# Patient Record
Sex: MALE | Race: WHITE | URBAN - METROPOLITAN AREA
[De-identification: names, ages, dates, MRNs, and addresses within clinical notes are randomized per-mention and may not be internally consistent; named-entity substitution may affect disease eponyms.]

---

## 2017-03-13 ENCOUNTER — OFFICE VISIT (OUTPATIENT)
Dept: ENDOCRINOLOGY | Age: 67
End: 2017-03-13

## 2017-03-13 VITALS
DIASTOLIC BLOOD PRESSURE: 82 MMHG | HEIGHT: 69 IN | HEART RATE: 73 BPM | SYSTOLIC BLOOD PRESSURE: 130 MMHG | WEIGHT: 221 LBS | OXYGEN SATURATION: 98 % | BODY MASS INDEX: 32.73 KG/M2

## 2017-03-13 DIAGNOSIS — E78.2 MIXED HYPERLIPIDEMIA: ICD-10-CM

## 2017-03-13 DIAGNOSIS — N18.3 CHRONIC KIDNEY DISEASE (CKD), STAGE 3 (MODERATE): ICD-10-CM

## 2017-03-13 DIAGNOSIS — E55.9 VITAMIN D DEFICIENCY: ICD-10-CM

## 2017-03-13 DIAGNOSIS — G63 POLYNEUROPATHY ASSOCIATED WITH UNDERLYING DISEASE (HCC): ICD-10-CM

## 2017-03-13 DIAGNOSIS — R79.89 ABNORMAL CBC: ICD-10-CM

## 2017-03-13 PROCEDURE — 99204 OFFICE O/P NEW MOD 45 MIN: CPT | Performed by: NURSE PRACTITIONER

## 2017-03-13 RX ORDER — FUROSEMIDE 20 MG/1
20 TABLET ORAL 2 TIMES DAILY
COMMUNITY

## 2017-03-13 RX ORDER — NEBIVOLOL 20 MG/1
20 TABLET ORAL DAILY
COMMUNITY

## 2017-03-13 RX ORDER — INSULIN GLARGINE 100 [IU]/ML
50 INJECTION, SOLUTION SUBCUTANEOUS NIGHTLY
COMMUNITY

## 2017-03-13 RX ORDER — LOSARTAN POTASSIUM 100 MG/1
100 TABLET ORAL DAILY
COMMUNITY

## 2017-03-13 RX ORDER — CLONIDINE HYDROCHLORIDE 0.2 MG/1
0.2 TABLET ORAL 2 TIMES DAILY
COMMUNITY

## 2017-03-13 RX ORDER — ATORVASTATIN CALCIUM 40 MG/1
40 TABLET, FILM COATED ORAL DAILY
COMMUNITY

## 2017-03-15 ENCOUNTER — TELEPHONE (OUTPATIENT)
Dept: ENDOCRINOLOGY | Age: 67
End: 2017-03-15

## 2017-04-05 ENCOUNTER — PROCEDURE VISIT (OUTPATIENT)
Dept: ENDOCRINOLOGY | Age: 67
End: 2017-04-05

## 2017-04-05 PROCEDURE — 95251 CONT GLUC MNTR ANALYSIS I&R: CPT | Performed by: NURSE PRACTITIONER

## 2017-04-05 PROCEDURE — 95250 CONT GLUC MNTR PHYS/QHP EQP: CPT | Performed by: NURSE PRACTITIONER

## 2017-04-25 DIAGNOSIS — E78.2 MIXED HYPERLIPIDEMIA: ICD-10-CM

## 2017-04-25 DIAGNOSIS — E55.9 VITAMIN D DEFICIENCY: ICD-10-CM

## 2017-04-25 DIAGNOSIS — G63 POLYNEUROPATHY ASSOCIATED WITH UNDERLYING DISEASE (HCC): ICD-10-CM

## 2017-04-25 DIAGNOSIS — N18.3 CHRONIC KIDNEY DISEASE (CKD), STAGE 3 (MODERATE): ICD-10-CM

## 2017-04-25 DIAGNOSIS — R79.89 ABNORMAL CBC: ICD-10-CM

## 2017-04-25 LAB
A/G RATIO: 1.3 (ref 1.1–2.2)
ALBUMIN SERPL-MCNC: 3.4 G/DL (ref 3.4–5)
ALP BLD-CCNC: 60 U/L (ref 40–129)
ALT SERPL-CCNC: 12 U/L (ref 10–40)
ANION GAP SERPL CALCULATED.3IONS-SCNC: 14 MMOL/L (ref 3–16)
AST SERPL-CCNC: 11 U/L (ref 15–37)
BASOPHILS ABSOLUTE: 0.1 K/UL (ref 0–0.2)
BASOPHILS RELATIVE PERCENT: 0.8 %
BILIRUB SERPL-MCNC: 0.4 MG/DL (ref 0–1)
BUN BLDV-MCNC: 34 MG/DL (ref 7–20)
CALCIUM SERPL-MCNC: 8.2 MG/DL (ref 8.3–10.6)
CHLORIDE BLD-SCNC: 103 MMOL/L (ref 99–110)
CO2: 23 MMOL/L (ref 21–32)
CREAT SERPL-MCNC: 1.6 MG/DL (ref 0.8–1.3)
CREATININE URINE: 120 MG/DL (ref 39–259)
EOSINOPHILS ABSOLUTE: 0.4 K/UL (ref 0–0.6)
EOSINOPHILS RELATIVE PERCENT: 3 %
FOLATE: >20 NG/ML (ref 4.78–24.2)
GFR AFRICAN AMERICAN: 52
GFR NON-AFRICAN AMERICAN: 43
GLOBULIN: 2.7 G/DL
GLUCOSE BLD-MCNC: 252 MG/DL (ref 70–99)
HCT VFR BLD CALC: 31.3 % (ref 40.5–52.5)
HEMOGLOBIN: 9.8 G/DL (ref 13.5–17.5)
HOMOCYSTEINE: 12 UMOL/L (ref 0–10)
LYMPHOCYTES ABSOLUTE: 2.8 K/UL (ref 1–5.1)
LYMPHOCYTES RELATIVE PERCENT: 23.1 %
MAGNESIUM: 2.4 MG/DL (ref 1.8–2.4)
MCH RBC QN AUTO: 25.3 PG (ref 26–34)
MCHC RBC AUTO-ENTMCNC: 31.3 G/DL (ref 31–36)
MCV RBC AUTO: 80.8 FL (ref 80–100)
MICROALBUMIN UR-MCNC: 503 MG/DL
MICROALBUMIN/CREAT UR-RTO: 4191.7 MG/G (ref 0–30)
MONOCYTES ABSOLUTE: 1.1 K/UL (ref 0–1.3)
MONOCYTES RELATIVE PERCENT: 9 %
NEUTROPHILS ABSOLUTE: 7.7 K/UL (ref 1.7–7.7)
NEUTROPHILS RELATIVE PERCENT: 64.1 %
PDW BLD-RTO: 15.4 % (ref 12.4–15.4)
PLATELET # BLD: 335 K/UL (ref 135–450)
PMV BLD AUTO: 7.4 FL (ref 5–10.5)
POTASSIUM SERPL-SCNC: 4.8 MMOL/L (ref 3.5–5.1)
RBC # BLD: 3.87 M/UL (ref 4.2–5.9)
SODIUM BLD-SCNC: 140 MMOL/L (ref 136–145)
TOTAL PROTEIN: 6.1 G/DL (ref 6.4–8.2)
URIC ACID, SERUM: 6.4 MG/DL (ref 3.5–7.2)
VITAMIN B-12: 594 PG/ML (ref 211–911)
VITAMIN D 25-HYDROXY: 17.1 NG/ML
WBC # BLD: 12 K/UL (ref 4–11)

## 2017-04-27 LAB
C-PEPTIDE: 3.3 NG/ML (ref 0.8–3.5)
METHYLMALONIC ACID: 0.21 UMOL/L (ref 0–0.4)

## 2017-04-27 RX ORDER — ERGOCALCIFEROL (VITAMIN D2) 1250 MCG
50000 CAPSULE ORAL WEEKLY
Qty: 12 CAPSULE | Refills: 2 | Status: SHIPPED | OUTPATIENT
Start: 2017-04-27 | End: 2017-05-27

## 2017-06-26 ENCOUNTER — TELEPHONE (OUTPATIENT)
Dept: ENDOCRINOLOGY | Age: 67
End: 2017-06-26

## 2017-06-27 ENCOUNTER — OFFICE VISIT (OUTPATIENT)
Dept: ENDOCRINOLOGY | Age: 67
End: 2017-06-27

## 2017-06-27 VITALS
HEART RATE: 62 BPM | BODY MASS INDEX: 31.43 KG/M2 | HEIGHT: 69 IN | DIASTOLIC BLOOD PRESSURE: 82 MMHG | SYSTOLIC BLOOD PRESSURE: 128 MMHG | OXYGEN SATURATION: 98 % | WEIGHT: 212.2 LBS

## 2017-06-27 DIAGNOSIS — D72.828 OTHER ELEVATED WHITE BLOOD CELL (WBC) COUNT: ICD-10-CM

## 2017-06-27 DIAGNOSIS — G63 POLYNEUROPATHY ASSOCIATED WITH UNDERLYING DISEASE (HCC): ICD-10-CM

## 2017-06-27 DIAGNOSIS — E78.2 MIXED HYPERLIPIDEMIA: ICD-10-CM

## 2017-06-27 DIAGNOSIS — N18.4 CHRONIC KIDNEY DISEASE (CKD), STAGE 4 (SEVERE): ICD-10-CM

## 2017-06-27 DIAGNOSIS — E55.9 VITAMIN D DEFICIENCY: ICD-10-CM

## 2017-06-27 DIAGNOSIS — R79.83 HOMOCYSTEINEMIA: ICD-10-CM

## 2017-06-27 DIAGNOSIS — D72.821 MONOCYTOSIS: ICD-10-CM

## 2017-06-27 PROCEDURE — 99214 OFFICE O/P EST MOD 30 MIN: CPT | Performed by: NURSE PRACTITIONER

## 2017-06-27 ASSESSMENT — PATIENT HEALTH QUESTIONNAIRE - PHQ9
SUM OF ALL RESPONSES TO PHQ9 QUESTIONS 1 & 2: 0
2. FEELING DOWN, DEPRESSED OR HOPELESS: 0
SUM OF ALL RESPONSES TO PHQ QUESTIONS 1-9: 0
1. LITTLE INTEREST OR PLEASURE IN DOING THINGS: 0

## 2017-07-17 ENCOUNTER — CLINICAL DOCUMENTATION (OUTPATIENT)
Dept: NEPHROLOGY | Age: 67
End: 2017-07-17

## 2017-07-26 RX ORDER — LANCETS
1 EACH MISCELLANEOUS
Qty: 150 EACH | Refills: 1 | Status: SHIPPED | OUTPATIENT
Start: 2017-07-26

## 2023-03-02 ENCOUNTER — APPOINTMENT (OUTPATIENT)
Dept: CT IMAGING | Age: 73
DRG: 871 | End: 2023-03-02
Payer: MEDICARE

## 2023-03-02 ENCOUNTER — HOSPITAL ENCOUNTER (INPATIENT)
Age: 73
LOS: 3 days | Discharge: HOME | DRG: 871 | End: 2023-03-05
Attending: EMERGENCY MEDICINE | Admitting: HOSPITALIST
Payer: MEDICARE

## 2023-03-02 ENCOUNTER — APPOINTMENT (OUTPATIENT)
Dept: GENERAL RADIOLOGY | Age: 73
DRG: 871 | End: 2023-03-02
Payer: MEDICARE

## 2023-03-02 DIAGNOSIS — R65.10 SIRS (SYSTEMIC INFLAMMATORY RESPONSE SYNDROME) (HCC): Primary | ICD-10-CM

## 2023-03-02 PROBLEM — A41.9 SEPSIS (HCC): Status: ACTIVE | Noted: 2023-03-02

## 2023-03-02 LAB
A/G RATIO: 1.4 (ref 1.1–2.2)
ALBUMIN SERPL-MCNC: 4 G/DL (ref 3.4–5)
ALP BLD-CCNC: 54 U/L (ref 40–129)
ALT SERPL-CCNC: 20 U/L (ref 10–40)
ANION GAP SERPL CALCULATED.3IONS-SCNC: 17 MMOL/L (ref 3–16)
APTT: 29.6 SEC (ref 23–34.3)
AST SERPL-CCNC: 24 U/L (ref 15–37)
BILIRUB SERPL-MCNC: 1.6 MG/DL (ref 0–1)
BUN BLDV-MCNC: 68 MG/DL (ref 7–20)
CALCIUM SERPL-MCNC: 9 MG/DL (ref 8.3–10.6)
CHLORIDE BLD-SCNC: 99 MMOL/L (ref 99–110)
CO2: 25 MMOL/L (ref 21–32)
CREAT SERPL-MCNC: 3.6 MG/DL (ref 0.8–1.3)
EKG ATRIAL RATE: 64 BPM
EKG DIAGNOSIS: NORMAL
EKG P AXIS: -13 DEGREES
EKG P-R INTERVAL: 186 MS
EKG Q-T INTERVAL: 426 MS
EKG QRS DURATION: 90 MS
EKG QTC CALCULATION (BAZETT): 439 MS
EKG R AXIS: 4 DEGREES
EKG T AXIS: 35 DEGREES
EKG VENTRICULAR RATE: 64 BPM
GFR SERPL CREATININE-BSD FRML MDRD: 17 ML/MIN/{1.73_M2}
GLUCOSE BLD-MCNC: 105 MG/DL (ref 70–99)
GLUCOSE BLD-MCNC: 129 MG/DL (ref 70–99)
LACTIC ACID, SEPSIS: 1.7 MMOL/L (ref 0.4–1.9)
PERFORMED ON: ABNORMAL
POTASSIUM SERPL-SCNC: 3.7 MMOL/L (ref 3.5–5.1)
PRO-BNP: 3889 PG/ML (ref 0–449)
SARS-COV-2, NAAT: NOT DETECTED
SODIUM BLD-SCNC: 141 MMOL/L (ref 136–145)
TOTAL PROTEIN: 6.9 G/DL (ref 6.4–8.2)
TROPONIN: 0.04 NG/ML

## 2023-03-02 PROCEDURE — 6370000000 HC RX 637 (ALT 250 FOR IP): Performed by: HOSPITALIST

## 2023-03-02 PROCEDURE — 84484 ASSAY OF TROPONIN QUANT: CPT

## 2023-03-02 PROCEDURE — 99285 EMERGENCY DEPT VISIT HI MDM: CPT

## 2023-03-02 PROCEDURE — 6360000002 HC RX W HCPCS: Performed by: HOSPITALIST

## 2023-03-02 PROCEDURE — 80053 COMPREHEN METABOLIC PANEL: CPT

## 2023-03-02 PROCEDURE — 96374 THER/PROPH/DIAG INJ IV PUSH: CPT

## 2023-03-02 PROCEDURE — 6360000002 HC RX W HCPCS: Performed by: PHYSICIAN ASSISTANT

## 2023-03-02 PROCEDURE — 71045 X-RAY EXAM CHEST 1 VIEW: CPT

## 2023-03-02 PROCEDURE — 87040 BLOOD CULTURE FOR BACTERIA: CPT

## 2023-03-02 PROCEDURE — 83605 ASSAY OF LACTIC ACID: CPT

## 2023-03-02 PROCEDURE — 93005 ELECTROCARDIOGRAM TRACING: CPT | Performed by: EMERGENCY MEDICINE

## 2023-03-02 PROCEDURE — 2580000003 HC RX 258: Performed by: PHYSICIAN ASSISTANT

## 2023-03-02 PROCEDURE — 71250 CT THORAX DX C-: CPT

## 2023-03-02 PROCEDURE — 83880 ASSAY OF NATRIURETIC PEPTIDE: CPT

## 2023-03-02 PROCEDURE — 87635 SARS-COV-2 COVID-19 AMP PRB: CPT

## 2023-03-02 PROCEDURE — 93010 ELECTROCARDIOGRAM REPORT: CPT | Performed by: INTERNAL MEDICINE

## 2023-03-02 PROCEDURE — 2580000003 HC RX 258: Performed by: HOSPITALIST

## 2023-03-02 PROCEDURE — 85025 COMPLETE CBC W/AUTO DIFF WBC: CPT

## 2023-03-02 PROCEDURE — 36415 COLL VENOUS BLD VENIPUNCTURE: CPT

## 2023-03-02 PROCEDURE — 1200000000 HC SEMI PRIVATE

## 2023-03-02 PROCEDURE — 85730 THROMBOPLASTIN TIME PARTIAL: CPT

## 2023-03-02 PROCEDURE — 96375 TX/PRO/DX INJ NEW DRUG ADDON: CPT

## 2023-03-02 RX ORDER — HEPARIN SODIUM 5000 [USP'U]/ML
5000 INJECTION, SOLUTION INTRAVENOUS; SUBCUTANEOUS EVERY 8 HOURS SCHEDULED
Status: DISCONTINUED | OUTPATIENT
Start: 2023-03-02 | End: 2023-03-05 | Stop reason: HOSPADM

## 2023-03-02 RX ORDER — SODIUM CHLORIDE 0.9 % (FLUSH) 0.9 %
5-40 SYRINGE (ML) INJECTION PRN
Status: DISCONTINUED | OUTPATIENT
Start: 2023-03-02 | End: 2023-03-05 | Stop reason: HOSPADM

## 2023-03-02 RX ORDER — ASPIRIN 81 MG/1
81 TABLET ORAL DAILY
Status: DISCONTINUED | OUTPATIENT
Start: 2023-03-03 | End: 2023-03-05 | Stop reason: HOSPADM

## 2023-03-02 RX ORDER — CLOPIDOGREL BISULFATE 75 MG/1
75 TABLET ORAL DAILY
Status: DISCONTINUED | OUTPATIENT
Start: 2023-03-03 | End: 2023-03-05 | Stop reason: HOSPADM

## 2023-03-02 RX ORDER — LIDOCAINE AND PRILOCAINE 25; 25 MG/G; MG/G
CREAM TOPICAL PRN
COMMUNITY

## 2023-03-02 RX ORDER — FINASTERIDE 5 MG/1
5 TABLET, FILM COATED ORAL DAILY
Status: DISCONTINUED | OUTPATIENT
Start: 2023-03-03 | End: 2023-03-05 | Stop reason: HOSPADM

## 2023-03-02 RX ORDER — PANTOPRAZOLE SODIUM 40 MG/1
40 TABLET, DELAYED RELEASE ORAL DAILY
COMMUNITY

## 2023-03-02 RX ORDER — POLYETHYLENE GLYCOL 3350 17 G/17G
17 POWDER, FOR SOLUTION ORAL DAILY
COMMUNITY

## 2023-03-02 RX ORDER — AMLODIPINE BESYLATE 10 MG/1
10 TABLET ORAL DAILY
COMMUNITY

## 2023-03-02 RX ORDER — LOSARTAN POTASSIUM 100 MG/1
100 TABLET ORAL DAILY
Status: DISCONTINUED | OUTPATIENT
Start: 2023-03-03 | End: 2023-03-04

## 2023-03-02 RX ORDER — INSULIN LISPRO 100 [IU]/ML
0-4 INJECTION, SOLUTION INTRAVENOUS; SUBCUTANEOUS NIGHTLY
Status: DISCONTINUED | OUTPATIENT
Start: 2023-03-02 | End: 2023-03-05 | Stop reason: HOSPADM

## 2023-03-02 RX ORDER — DULAGLUTIDE 4.5 MG/.5ML
4.5 INJECTION, SOLUTION SUBCUTANEOUS WEEKLY
COMMUNITY

## 2023-03-02 RX ORDER — ATORVASTATIN CALCIUM 80 MG/1
80 TABLET, FILM COATED ORAL DAILY
Status: DISCONTINUED | OUTPATIENT
Start: 2023-03-03 | End: 2023-03-05 | Stop reason: HOSPADM

## 2023-03-02 RX ORDER — LEVOTHYROXINE SODIUM 0.05 MG/1
50 TABLET ORAL DAILY
COMMUNITY

## 2023-03-02 RX ORDER — ATORVASTATIN CALCIUM 80 MG/1
80 TABLET, FILM COATED ORAL DAILY
COMMUNITY

## 2023-03-02 RX ORDER — SODIUM CHLORIDE 9 MG/ML
INJECTION, SOLUTION INTRAVENOUS PRN
Status: DISCONTINUED | OUTPATIENT
Start: 2023-03-02 | End: 2023-03-05 | Stop reason: HOSPADM

## 2023-03-02 RX ORDER — POLYETHYLENE GLYCOL 3350 17 G/17G
17 POWDER, FOR SOLUTION ORAL DAILY PRN
Status: DISCONTINUED | OUTPATIENT
Start: 2023-03-02 | End: 2023-03-05 | Stop reason: HOSPADM

## 2023-03-02 RX ORDER — INSULIN LISPRO 100 [IU]/ML
0-4 INJECTION, SOLUTION INTRAVENOUS; SUBCUTANEOUS
Status: DISCONTINUED | OUTPATIENT
Start: 2023-03-03 | End: 2023-03-05 | Stop reason: HOSPADM

## 2023-03-02 RX ORDER — ONDANSETRON 4 MG/1
4 TABLET, ORALLY DISINTEGRATING ORAL EVERY 8 HOURS PRN
Status: DISCONTINUED | OUTPATIENT
Start: 2023-03-02 | End: 2023-03-05 | Stop reason: HOSPADM

## 2023-03-02 RX ORDER — PANTOPRAZOLE SODIUM 40 MG/1
40 TABLET, DELAYED RELEASE ORAL
Status: DISCONTINUED | OUTPATIENT
Start: 2023-03-03 | End: 2023-03-05 | Stop reason: HOSPADM

## 2023-03-02 RX ORDER — FINASTERIDE 5 MG/1
5 TABLET, FILM COATED ORAL DAILY
COMMUNITY

## 2023-03-02 RX ORDER — SODIUM CHLORIDE 0.9 % (FLUSH) 0.9 %
5-40 SYRINGE (ML) INJECTION EVERY 12 HOURS SCHEDULED
Status: DISCONTINUED | OUTPATIENT
Start: 2023-03-02 | End: 2023-03-05 | Stop reason: HOSPADM

## 2023-03-02 RX ORDER — CLOPIDOGREL BISULFATE 75 MG/1
75 TABLET ORAL DAILY
COMMUNITY

## 2023-03-02 RX ORDER — ICOSAPENT ETHYL 1000 MG/1
1 CAPSULE ORAL DAILY
COMMUNITY

## 2023-03-02 RX ORDER — LEVOTHYROXINE SODIUM 0.1 MG/1
50 TABLET ORAL DAILY
Status: DISCONTINUED | OUTPATIENT
Start: 2023-03-03 | End: 2023-03-05 | Stop reason: HOSPADM

## 2023-03-02 RX ORDER — ACETAMINOPHEN 325 MG/1
650 TABLET ORAL EVERY 6 HOURS PRN
Status: DISCONTINUED | OUTPATIENT
Start: 2023-03-02 | End: 2023-03-05 | Stop reason: HOSPADM

## 2023-03-02 RX ORDER — AMLODIPINE BESYLATE 5 MG/1
10 TABLET ORAL DAILY
Status: DISCONTINUED | OUTPATIENT
Start: 2023-03-03 | End: 2023-03-03

## 2023-03-02 RX ORDER — SEVELAMER HYDROCHLORIDE 800 MG/1
800 TABLET, FILM COATED ORAL
COMMUNITY

## 2023-03-02 RX ORDER — INSULIN GLARGINE 100 [IU]/ML
4 INJECTION, SOLUTION SUBCUTANEOUS NIGHTLY
Status: DISCONTINUED | OUTPATIENT
Start: 2023-03-02 | End: 2023-03-05 | Stop reason: HOSPADM

## 2023-03-02 RX ORDER — ACETAMINOPHEN 650 MG/1
650 SUPPOSITORY RECTAL EVERY 6 HOURS PRN
Status: DISCONTINUED | OUTPATIENT
Start: 2023-03-02 | End: 2023-03-05 | Stop reason: HOSPADM

## 2023-03-02 RX ORDER — ONDANSETRON 2 MG/ML
4 INJECTION INTRAMUSCULAR; INTRAVENOUS EVERY 6 HOURS PRN
Status: DISCONTINUED | OUTPATIENT
Start: 2023-03-02 | End: 2023-03-05 | Stop reason: HOSPADM

## 2023-03-02 RX ORDER — DEXTROSE MONOHYDRATE 100 MG/ML
INJECTION, SOLUTION INTRAVENOUS CONTINUOUS PRN
Status: DISCONTINUED | OUTPATIENT
Start: 2023-03-02 | End: 2023-03-05 | Stop reason: HOSPADM

## 2023-03-02 RX ADMIN — CEFEPIME 2000 MG: 2 INJECTION, POWDER, FOR SOLUTION INTRAVENOUS at 17:52

## 2023-03-02 RX ADMIN — METOPROLOL TARTRATE 25 MG: 25 TABLET, FILM COATED ORAL at 22:35

## 2023-03-02 RX ADMIN — SODIUM CHLORIDE, PRESERVATIVE FREE 10 ML: 5 INJECTION INTRAVENOUS at 22:44

## 2023-03-02 RX ADMIN — VANCOMYCIN HYDROCHLORIDE 750 MG: 750 INJECTION, POWDER, LYOPHILIZED, FOR SOLUTION INTRAVENOUS at 18:45

## 2023-03-02 RX ADMIN — HEPARIN SODIUM 5000 UNITS: 5000 INJECTION INTRAVENOUS; SUBCUTANEOUS at 22:46

## 2023-03-02 RX ADMIN — INSULIN GLARGINE 4 UNITS: 100 INJECTION, SOLUTION SUBCUTANEOUS at 22:35

## 2023-03-02 ASSESSMENT — ENCOUNTER SYMPTOMS
ABDOMINAL PAIN: 0
DIARRHEA: 0
VOMITING: 0
NAUSEA: 1
RHINORRHEA: 0
WHEEZING: 0
COUGH: 0
SHORTNESS OF BREATH: 0

## 2023-03-02 ASSESSMENT — LIFESTYLE VARIABLES
HOW MANY STANDARD DRINKS CONTAINING ALCOHOL DO YOU HAVE ON A TYPICAL DAY: 1 OR 2
HOW OFTEN DO YOU HAVE A DRINK CONTAINING ALCOHOL: NEVER
HOW OFTEN DO YOU HAVE A DRINK CONTAINING ALCOHOL: MONTHLY OR LESS

## 2023-03-02 NOTE — ED NOTES
Patient request bearhugger blanket for cramps states it is what helped last time.   Placed on low setting   Temp 96.8 temporal     Dominick Malcolm RN  03/02/23 8679

## 2023-03-02 NOTE — PROGRESS NOTES
Pharmacy Home Medication Reconciliation Note    A medication reconciliation has been completed for Parris Hinson 1950    Pharmacy: [address] [place] [address] provided by: patient    The patient's home medication list is as follows: No current facility-administered medications on file prior to encounter. Current Outpatient Medications on File Prior to Encounter   Medication Sig Dispense Refill    atorvastatin (LIPITOR) 80 MG tablet Take 80 mg by mouth daily      Cholecalciferol (VITAMIN D3) 125 MCG (5000 UT) TABS Take 1 tablet by mouth daily      clopidogrel (PLAVIX) 75 MG tablet Take 75 mg by mouth daily      amLODIPine (NORVASC) 10 MG tablet Take 10 mg by mouth daily      Dulaglutide (TRULICITY) 4.5 VZ/4.1JD SOPN Inject 4.5 mg into the skin once a week      finasteride (PROSCAR) 5 MG tablet Take 5 mg by mouth daily      Icosapent Ethyl (VASCEPA) 1 g CAPS capsule Take 1 capsule by mouth daily      levothyroxine (SYNTHROID) 50 MCG tablet Take 50 mcg by mouth Daily      metoprolol tartrate (LOPRESSOR) 25 MG tablet Take 25 mg by mouth 2 times daily      pantoprazole (PROTONIX) 40 MG tablet Take 40 mg by mouth daily      polyethylene glycol (GLYCOLAX) 17 GM/SCOOP powder Take 17 g by mouth daily      sevelamer hcl (RENAGEL) 800 MG tablet Take 800 mg by mouth 3 times daily (with meals)      lidocaine-prilocaine (EMLA) 2.5-2.5 % cream Apply topically as needed for Pain Apply topically as needed. insulin lispro (HUMALOG KWIKPEN) 100 UNIT/ML pen 15-20 units AC TID (Patient not taking: Reported on 3/2/2023) 5 Pen 0    Insulin Pen Needle ([employer] PEN NEEDLES) 31G X 6 MM MISC 1 each by Does not apply route 5 times daily 300 each 0    glucose blood VI test strips (ACCU-CHEK SMARTVIEW) strip 1 each by In Vitro route 5 times daily As needed.  150 each 1    ACCU-CHEK FASTCLIX LANCETS MISC 1 each by Does not apply route 5 times daily 306 each 1    folic acid-pyridoxine-cyancobalamin (FOLBIC) 2.5-25-2 MG TABS Take 1 tablet by mouth daily (Patient not taking: Reported on 3/2/2023) 30 tablet 5    ergocalciferol (DRISDOL) 33128 UNITS capsule Take 1 capsule by mouth once a week (Patient not taking: Reported on 3/2/2023) 12 capsule 2    losartan (COZAAR) 100 MG tablet Take 100 mg by mouth daily      atorvastatin (LIPITOR) 40 MG tablet Take 40 mg by mouth daily (Patient not taking: Reported on 3/2/2023)      cloNIDine (CATAPRES) 0.2 MG tablet Take 0.2 mg by mouth 2 times daily      nebivolol (BYSTOLIC) 20 MG TABS tablet Take 20 mg by mouth daily (Patient not taking: Reported on 3/2/2023)      furosemide (LASIX) 20 MG tablet Take 20 mg by mouth 2 times daily      insulin glargine (LANTUS) 100 UNIT/ML injection vial Inject 4-12 Units into the skin nightly      Liraglutide (VICTOZA) 18 MG/3ML SOPN SC injection Inject 1.8 mg into the skin daily (Patient not taking: Reported on 3/2/2023)      DHA-Vitamin C-Lutein (EYE HEALTH FORMULA PO) Take by mouth (Patient not taking: Reported on 3/2/2023)      B Complex-C-Folic Acid (STRESS FORMULA PO) Take by mouth      Omega-3 Fatty Acids (FISH OIL) 1000 MG CAPS Take 2 capsules by mouth daily. (Patient not taking: Reported on 3/2/2023)  0    Multiple Vitamin (MULTIVITAMIN PO) Take 1 tablet by mouth daily. aspirin EC 81 MG EC tablet Take 1 tablet by mouth daily. 30 tablet 11       Patient is no longer taking weekly vitamin D (taking daily dose), Victoza, Humalog, Folbic, DHA-Vitamin C eye health PO formula, Bystolic, Omega-3 (taking Vascepa). Timing of last doses updated. Thank you,  Lou Caal

## 2023-03-02 NOTE — ED PROVIDER NOTES
[facility]        Pt Name: Joby Burr  MRN: [id number]  [place] 1950  Date of evaluation: 3/2/2023  Provider: Tyler Cervantes PA-C  PCP: Radford Gowers  Note Started: 4:04 PM EST 3/2/23       I have seen and evaluated this patient with my supervising physician Vinod Holland MD.      [facility]       Chief Complaint   Patient presents with    Chest Pain     From dialysis by [facility]. Started cp 20 min pta and decrease when sits up. C/o cramping in legs       HISTORY OF PRESENT ILLNESS: 1 or more Elements     History From: patient  Limitations to history : None    Joby Burr is a 74 y.o. male who presents for evaluation of chest pain that started about 20 minutes ago while getting dialysis. Patient states he newly completed 2 hours of dialysis. He states he also had generalized body aches, chills and nausea during this time. No cough or congestion. No vomiting or diarrhea. He has no other complaints or concerns at this time. Nursing Notes were all reviewed and agreed with or any disagreements were addressed in the HPI. REVIEW OF SYSTEMS :      Review of Systems   Constitutional:  Positive for chills. Negative for appetite change and fever. HENT:  Negative for congestion and rhinorrhea. Respiratory:  Negative for cough, shortness of breath and wheezing. Cardiovascular:  Positive for chest pain. Gastrointestinal:  Positive for nausea. Negative for abdominal pain, diarrhea and vomiting. Genitourinary:  Negative for difficulty urinating, dysuria and hematuria. Musculoskeletal:  Positive for myalgias. Negative for neck pain and neck stiffness. Skin:  Negative for rash. Neurological:  Negative for headaches. Positives and Pertinent negatives as per HPI.      SURGICAL HISTORY     Past Surgical History:   Procedure Laterality Date    CARDIAC SURGERY      HAND SURGERY      KNEE ARTHROSCOPY CURRENTMEDICATIONS       Previous Medications    ACCU-CHEK FASTCLIX LANCETS MISC    1 each by Does not apply route 5 times daily    AMLODIPINE (NORVASC) 10 MG TABLET    Take 10 mg by mouth daily    ASPIRIN EC 81 MG EC TABLET    Take 1 tablet by mouth daily. ATORVASTATIN (LIPITOR) 40 MG TABLET    Take 40 mg by mouth daily    ATORVASTATIN (LIPITOR) 80 MG TABLET    Take 80 mg by mouth daily    B COMPLEX-C-FOLIC ACID (STRESS FORMULA PO)    Take by mouth    CHOLECALCIFEROL (VITAMIN D3) 125 MCG (5000 UT) TABS    Take 1 tablet by mouth daily    CLONIDINE (CATAPRES) 0.2 MG TABLET    Take 0.2 mg by mouth 2 times daily    CLOPIDOGREL (PLAVIX) 75 MG TABLET    Take 75 mg by mouth daily    DHA-VITAMIN C-LUTEIN (EYE HEALTH FORMULA PO)    Take by mouth    DULAGLUTIDE (TRULICITY) 4.5 SZ/5.4HS SOPN    Inject 4.5 mg into the skin once a week    ERGOCALCIFEROL (DRISDOL) 93433 UNITS CAPSULE    Take 1 capsule by mouth once a week    FINASTERIDE (PROSCAR) 5 MG TABLET    Take 5 mg by mouth daily    FOLIC ACID-PYRIDOXINE-CYANCOBALAMIN (FOLBIC) 2.5-25-2 MG TABS    Take 1 tablet by mouth daily    FUROSEMIDE (LASIX) 20 MG TABLET    Take 20 mg by mouth 2 times daily    GLUCOSE BLOOD VI TEST STRIPS (ACCU-CHEK SMARTVIEW) STRIP    1 each by In Vitro route 5 times daily As needed. ICOSAPENT ETHYL (VASCEPA) 1 G CAPS CAPSULE    Take 1 capsule by mouth daily    INSULIN GLARGINE (LANTUS) 100 UNIT/ML INJECTION VIAL    Inject 4-12 Units into the skin nightly    INSULIN LISPRO (HUMALOG KWIKPEN) 100 UNIT/ML PEN    15-20 units AC TID    INSULIN PEN NEEDLE ([employer] PEN NEEDLES) 31G X 6 MM MISC    1 each by Does not apply route 5 times daily    LEVOTHYROXINE (SYNTHROID) 50 MCG TABLET    Take 50 mcg by mouth Daily    LIDOCAINE-PRILOCAINE (EMLA) 2.5-2.5 % CREAM    Apply topically as needed for Pain Apply topically as needed.     LIRAGLUTIDE (VICTOZA) 18 MG/3ML SOPN SC INJECTION    Inject 1.8 mg into the skin daily    LOSARTAN (COZAAR) 100 MG TABLET Take 100 mg by mouth daily    METOPROLOL TARTRATE (LOPRESSOR) 25 MG TABLET    Take 25 mg by mouth 2 times daily    MULTIPLE VITAMIN (MULTIVITAMIN PO)    Take 1 tablet by mouth daily. NEBIVOLOL (BYSTOLIC) 20 MG TABS TABLET    Take 20 mg by mouth daily    OMEGA-3 FATTY ACIDS (FISH OIL) 1000 MG CAPS    Take 2 capsules by mouth daily. PANTOPRAZOLE (PROTONIX) 40 MG TABLET    Take 40 mg by mouth daily    POLYETHYLENE GLYCOL (GLYCOLAX) 17 GM/SCOOP POWDER    Take 17 g by mouth daily    SEVELAMER HCL (RENAGEL) 800 MG TABLET    Take 800 mg by mouth 3 times daily (with meals)       ALLERGIES     Patient has no known allergies. FAMILYHISTORY       Family History   Problem Relation Age of Onset    Cancer Mother         colon    Heart Disease Father     Stroke Father     Heart Disease Sister     Kidney Disease Sister     Diabetes Neg Hx     High Blood Pressure Neg Hx     Osteoporosis Neg Hx     Thyroid Disease Neg Hx         SOCIAL HISTORY       Social History     Tobacco Use    Smoking status: Never    Smokeless tobacco: Never   Vaping Use    Vaping Use: Never used   Substance Use Topics    Alcohol use: Yes     Comment: rare    Drug use: No       SCREENINGS        Donald Coma Scale  Eye Opening: Spontaneous  Best Verbal Response: Oriented  Best Motor Response: Obeys commands  Donald Coma Scale Score: 15                CIWA Assessment  BP: 137/65  Heart Rate: 76           PHYSICAL EXAM  1 or more Elements     ED Triage Vitals [03/02/23 1341]   BP Temp Temp Source Heart Rate Resp SpO2 Height Weight   137/65 96.8 °F (36 °C) Temporal 76 16 96 % 5' 9\" (1.753 m) 176 lb (79.8 kg)       Physical Exam  Vitals and nursing note reviewed. Constitutional:       Appearance: He is well-developed. He is not ill-appearing or diaphoretic. HENT:      Head: Normocephalic and atraumatic.       Right Ear: External ear normal.      Left Ear: External ear normal.      Nose: Nose normal.      Mouth/Throat:      Mouth: Mucous membranes are moist.   Eyes:      General:         Right eye: No discharge. Left eye: No discharge. Cardiovascular:      Rate and Rhythm: Normal rate and regular rhythm. Heart sounds: Normal heart sounds. Pulmonary:      Effort: Pulmonary effort is normal. No respiratory distress. Breath sounds: Normal breath sounds. No wheezing, rhonchi or rales. Chest:      Chest wall: No tenderness. Abdominal:      General: There is no distension. Palpations: Abdomen is soft. Tenderness: There is no abdominal tenderness. Musculoskeletal:         General: Normal range of motion. Cervical back: Normal range of motion and neck supple. Skin:     General: Skin is warm and dry. Neurological:      Mental Status: He is alert and oriented to person, place, and time.    Psychiatric:         Behavior: Behavior normal.         DIAGNOSTIC RESULTS   LABS:    Labs Reviewed   CBC WITH AUTO DIFFERENTIAL - Abnormal; Notable for the following components:       Result Value    WBC 1.5 (*)     RBC 3.35 (*)     Hemoglobin 10.0 (*)     Hematocrit 30.0 (*)     Neutrophils Absolute 1.2 (*)     Lymphocytes Absolute 0.3 (*)     All other components within normal limits    Narrative:     CALL  [facility] tel. [phone],  Hematology results called to and read back by Bharat Lim, 03/02/2023  15:30, by [facility]  Hematology results called to and read back by DAMIEN Lim, 03/02/2023  14:58, by Rain   COMPREHENSIVE METABOLIC PANEL - Abnormal; Notable for the following components:    Anion Gap 17 (*)     Glucose 129 (*)     BUN 68 (*)     Creatinine 3.6 (*)     Est, Glom Filt Rate 17 (*)     Total Bilirubin 1.6 (*)     All other components within normal limits   TROPONIN - Abnormal; Notable for the following components:    Troponin 0.04 (*)     All other components within normal limits   BRAIN NATRIURETIC PEPTIDE - Abnormal; Notable for the following components:    Pro-BNP 3,889 (*)     All other components within normal limits   COVID-19, RAPID   CULTURE, BLOOD 1   CULTURE, BLOOD 2   APTT   LACTATE, SEPSIS   LACTATE, SEPSIS       When ordered only abnormal lab results are displayed. All other labs were within normal range or not returned as of this dictation. EKG: When ordered, EKG's are interpreted by the Emergency Department Physician in the absence of a cardiologist.  Please see their note for interpretation of EKG. RADIOLOGY:   Non-plain film images such as CT, Ultrasound and MRI are read by the radiologist. Plain radiographic images are visualized and preliminarily interpreted by the ED Provider with the below findings:    pending    Interpretation per the Radiologist below, if available at the time of this note:    XR CHEST PORTABLE   Final Result   No radiographic evidence of acute pulmonary disease. CT CHEST WO CONTRAST    (Results Pending)     XR CHEST PORTABLE    Result Date: 3/2/2023  EXAMINATION: ONE XRAY VIEW OF THE CHEST 3/2/2023 2:32 pm COMPARISON: None. HISTORY: ORDERING SYSTEM PROVIDED HISTORY: SOB TECHNOLOGIST PROVIDED HISTORY: Reason for exam:->SOB Reason for Exam: Chest Pain (From dialysis by [facility]. Started cp 20 min pta and decrease when sits up. C/o cramping in legs) FINDINGS: HEART/MEDIASTINUM: The cardiomediastinal silhouette is within normal limits. PLEURA/LUNGS: There are no focal consolidations or pleural effusions. There is no appreciable pneumothorax. BONES/SOFT TISSUE: No acute abnormality. There are median sternotomy wires. No radiographic evidence of acute pulmonary disease. No results found. PROCEDURES   Unless otherwise noted below, none     Procedures  Venous Access Procedure Note  Indication: nursing staff unable to obtain access    Procedure: The patient was placed in the appropriate position and the skin over the puncture site was prepped with Chloraprep. Intravenous access was obtained in a left hand vein and the site was secured appropriately.     The patient tolerated the procedure well. Complications: None      CRITICAL CARE TIME (.cctime)   There was a high probability of life-threatening clinical deterioration in the patient's condition requiring my urgent intervention. I personally saw the patient and independently provided 42 minutes of non-concurrent critical care out of the total shared critical care time provided, excluding separately reportable procedures. PAST MEDICAL HISTORY      has a past medical history of Fainted (06/2017), Hypertension, and Type II or unspecified type diabetes mellitus without mention of complication, not stated as uncontrolled. EMERGENCY DEPARTMENT COURSE and DIFFERENTIAL DIAGNOSIS/MDM:   Vitals:    Vitals:    03/02/23 1341 03/02/23 1756   BP: 137/65    Pulse: 76    Resp: 16    Temp: 96.8 °F (36 °C) (!) 100.7 °F (38.2 °C)   TempSrc: Temporal Oral   SpO2: 96%    Weight: 176 lb (79.8 kg)    Height: 5' 9\" (1.753 m)        Patient was given the following medications:  Medications   vancomycin (VANCOCIN) 750 mg in sodium chloride 0.9 % 250 mL IVPB (has no administration in time range)   cefepime (MAXIPIME) 2,000 mg in sodium chloride 0.9 % 50 mL IVPB (mini-bag) (2,000 mg IntraVENous New Bag 3/2/23 1752)             Is this patient to be included in the SEP-1 Core Measure due to severe sepsis or septic shock? No   Exclusion criteria - the patient is NOT to be included for SEP-1 Core Measure due to:  May have criteria for sepsis, but does not meet criteria for severe sepsis or septic shock    Chronic Conditions affecting care:    has a past medical history of Fainted (06/2017), Hypertension, and Type II or unspecified type diabetes mellitus without mention of complication, not stated as uncontrolled.     CONSULTS: (Who and What was discussed)  None      Social Determinants Significantly Affecting Health : None    Records Reviewed (External and Source) n/a    CC/HPI Summary, DDx, ED Course, and Reassessment: Patient presents for evaluation of chest pain that started during dialysis. On exam, he is resting comfortably in bed no acute distress and nontoxic. He is slightly hypoxic at 90 to 91% on room air was placed on 2 L nasal cannula oxygen. Vitals otherwise stable and he is afebrile. Lungs clear to auscultation bilaterally. Abdomen is soft, nontender. Please see attending note for EKG interpretation    Disposition Considerations (tests considered but not done, Admit vs D/C, Shared Decision Making, Pt Expectation of Test or Tx.): CBC and CMP are remarkable for leukopenia And neutropenia. He also has ESRD with a creat of 3.6, BUN of 68. Troponin elevated at 0.04, likely secondary to this. Coags within normal limits. BNP 3889. Chest x-ray is negative. Concern for infectious etiology including sepsis. Covered empirically with broad-spectrum antibiotic therapy. CT of the chest was added. COVID swab pending. This marks end of shift. Please see attending note for additional information regarding these results, reevaluation and patient disposition. I am the Primary Clinician of Record. FINAL IMPRESSION      1. SIRS (systemic inflammatory response syndrome) ([facility] [address]          DISPOSITION/PLAN     DISPOSITION Decision To Admit 03/02/2023 06:16:08 PM      PATIENT REFERRED TO:  No follow-up provider specified.     DISCHARGE MEDICATIONS:  New Prescriptions    No medications on file       DISCONTINUED MEDICATIONS:  Discontinued Medications    No medications on file              (Please note that portions of this note were completed with a voice recognition program.  Efforts were made to edit the dictations but occasionally words are mis-transcribed.)    Alisha Santana PA-C (electronically signed)            Mateo Jamil PA-C  03/02/23 2639

## 2023-03-02 NOTE — H&P
HOSPITALISTS HISTORY AND PHYSICAL    3/2/2023 6:53 PM    Patient Information:  Coreen Blackwell is a 74 y.o. male [id number]  PCP:  Sharyn Quarles (Tel: [phone] )    Chief complaint:    Chief Complaint   Patient presents with    Chest Pain     From dialysis by [facility]. Started cp 20 min pta and decrease when sits up. C/o cramping in legs        History of Present Illness:  Saad Lund is a 74 y.o. male who presented with from dialysis center. Patient apparently was 20 minutes i after ialysis when patient started having leg cramps. Felt as if he was having chest pain. Pain started generalized body ache weakness fevers chills. Was sent here for further evaluation patient was doing well otherwise no cough no congestion no diarrhea no new concern. Patient said he completed 2 hours of dialysis today  REVIEW OF SYSTEMS:   Constitutional: Negative for fever,chills or night sweats  ENT: Negative for rhinorrhea, epistaxis, hoarseness, sore throat. Respiratory: Negative for shortness of breath,wheezing  Cardiovascular: Negative for chest pain, palpitations   Gastrointestinal: Negative for nausea, vomiting, diarrhea  Genitourinary: Negative for polyuria, dysuria   Hematologic/Lymphatic: Negative for bleeding tendency, easy bruising  Musculoskeletal: Negative for myalgias and arthralgias  Neurologic: Negative for confusion,dysarthria. Skin: Negative for itching,rash, good capillary refill. Psychiatric: Negative for depression,anxiety, agitation. Endocrine: Negative for polydipsia,polyuria,heat /cold intolerance. Past Medical History:   has a past medical history of Fainted, Hypertension, and Type II or unspecified type diabetes mellitus without mention of complication, not stated as uncontrolled. Past Surgical History:   has a past surgical history that includes Knee arthroscopy; Hand surgery; and Cardiac surgery.      Medications:  No current facility-administered medications on file prior to encounter. Current Outpatient Medications on File Prior to Encounter   Medication Sig Dispense Refill    atorvastatin (LIPITOR) 80 MG tablet Take 80 mg by mouth daily      Cholecalciferol (VITAMIN D3) 125 MCG (5000 UT) TABS Take 1 tablet by mouth daily      clopidogrel (PLAVIX) 75 MG tablet Take 75 mg by mouth daily      amLODIPine (NORVASC) 10 MG tablet Take 10 mg by mouth daily      Dulaglutide (TRULICITY) 4.5 TT/7.1BC SOPN Inject 4.5 mg into the skin once a week      finasteride (PROSCAR) 5 MG tablet Take 5 mg by mouth daily      Icosapent Ethyl (VASCEPA) 1 g CAPS capsule Take 1 capsule by mouth daily      levothyroxine (SYNTHROID) 50 MCG tablet Take 50 mcg by mouth Daily      metoprolol tartrate (LOPRESSOR) 25 MG tablet Take 25 mg by mouth 2 times daily      pantoprazole (PROTONIX) 40 MG tablet Take 40 mg by mouth daily      polyethylene glycol (GLYCOLAX) 17 GM/SCOOP powder Take 17 g by mouth daily      sevelamer hcl (RENAGEL) 800 MG tablet Take 800 mg by mouth 3 times daily (with meals)      lidocaine-prilocaine (EMLA) 2.5-2.5 % cream Apply topically as needed for Pain Apply topically as needed. Insulin Pen Needle ([employer] PEN NEEDLES) 31G X 6 MM MISC 1 each by Does not apply route 5 times daily 300 each 0    glucose blood VI test strips (ACCU-CHEK SMARTVIEW) strip 1 each by In Vitro route 5 times daily As needed.  150 each 1    ACCU-CHEK FASTCLIX LANCETS MISC 1 each by Does not apply route 5 times daily 150 each 1    ergocalciferol (DRISDOL) 95231 UNITS capsule Take 1 capsule by mouth once a week (Patient not taking: Reported on 3/2/2023) 12 capsule 2    losartan (COZAAR) 100 MG tablet Take 100 mg by mouth daily      atorvastatin (LIPITOR) 40 MG tablet Take 40 mg by mouth daily (Patient not taking: Reported on 3/2/2023)      cloNIDine (CATAPRES) 0.2 MG tablet Take 0.2 mg by mouth 2 times daily      furosemide (LASIX) 20 MG tablet Take 20 mg by mouth 2 times daily      insulin glargine (LANTUS) 100 UNIT/ML injection vial Inject 4-12 Units into the skin nightly      DHA-Vitamin C-Lutein (EYE HEALTH FORMULA PO) Take by mouth (Patient not taking: Reported on 3/2/2023)      B Complex-C-Folic Acid (STRESS FORMULA PO) Take by mouth      Multiple Vitamin (MULTIVITAMIN PO) Take 1 tablet by mouth daily. aspirin EC 81 MG EC tablet Take 1 tablet by mouth daily. 30 tablet 11       Allergies:  No Known Allergies     Social History:   reports that he has never smoked. He has never used smokeless tobacco. He reports current alcohol use. He reports that he does not use drugs. Family History:  family history includes Cancer in his mother; Heart Disease in his father and sister; Kidney Disease in his sister; Stroke in his father. ,     Physical Exam:  /65   Pulse 76   Temp (!) 100.7 °F (38.2 °C) (Oral)   Resp 16   Ht 5' 9\" (1.753 m)   Wt 176 lb (79.8 kg)   SpO2 96%   BMI 25.99 kg/m²     General appearance:  Appears comfortable. Well nourished  Eyes: Sclera clear, pupils equal  ENT: Moist mucus membranes, no thrush. Trachea midline. Cardiovascular: Regular rhythm, normal S1, S2. No murmur, gallop, rub. No edema in lower extremities  Respiratory: Clear to auscultation bilaterally, no wheeze, good inspiratory effort  Gastrointestinal: Abdomen soft, non-tender, not distended, normal bowel sounds  Musculoskeletal: No cyanosis in digits, neck supple  Neurology: Cranial nerves grossly intact. Alert and oriented in time, place and person. No speech or motor deficits  Psychiatry: Appropriate affect.  Not agitated  Skin: Warm, dry, normal turgor, no rash    Labs:  CBC:   Lab Results   Component Value Date/Time    WBC 1.5 03/02/2023 02:11 PM    RBC 3.35 03/02/2023 02:11 PM    HGB 10.0 03/02/2023 02:11 PM    HCT 30.0 03/02/2023 02:11 PM    MCV 89.5 03/02/2023 02:11 PM    MCH 29.7 03/02/2023 02:11 PM    MCHC 33.2 03/02/2023 02:11 PM    RDW 14.2 03/02/2023 02:11 PM    PLT 152 03/02/2023 02:11 PM    MPV 6.7 03/02/2023 02:11 PM     BMP:    Lab Results   Component Value Date/Time     03/02/2023 02:11 PM    K 3.7 03/02/2023 02:11 PM    CL 99 03/02/2023 02:11 PM    CO2 25 03/02/2023 02:11 PM    BUN 68 03/02/2023 02:11 PM    CREATININE 3.6 03/02/2023 02:11 PM    CALCIUM 9.0 03/02/2023 02:11 PM    GFRAA 52 04/25/2017 08:24 AM    GFRAA 49 03/04/2013 09:44 AM    LABGLOM 17 03/02/2023 02:11 PM    GLUCOSE 129 03/02/2023 02:11 PM       Chest Xray:   EKG:    I visualized CXR images and EKG strips     Discussed  with     Problem List  Principal Problem:    Sepsis ([address]  Resolved Problems:    * No resolved hospital problems. *        Assessment/Plan:   Fever  -Meet SIRS criteria-  -Given dialysis. Low-grade fever and low WBC  Patient will be admitted for further work-up  -Blood culture has been collected  -Start broad-spectrum antibiotics  -Monitor closely    End-stage renal on hemodialysis  -Per nephrology    Thrombocytopenia with low WBC  -Related to ongoing infection  -Monitor closely  -Julio labs in the morning    Insulin-dependent diabetes mellitus  -Continue sliding scale Lantus.           Ally Brito MD    3/2/2023 6:53 PM

## 2023-03-02 NOTE — ED PROVIDER NOTES
This patient was seen by the Mid-Level Provider. I have seen and examined the patient, agree with the workup, evaluation, management and diagnosis. Care plan has been discussed. My assessment reveals a 56-year-old male who presents with chest pain. This is a 56-year-old male, end-stage renal disease, who is receiving dialysis today who started to have some chest pain. The patient also complained of some shaking and chills as well. The patient also complains of body aches. He presents from dialysis. Radiology results:    CT CHEST WO CONTRAST   Final Result   Dependent airspace disease at the lung bases, likely atelectasis. XR CHEST PORTABLE   Final Result   No radiographic evidence of acute pulmonary disease.                LABS:    Labs Reviewed   CBC WITH AUTO DIFFERENTIAL - Abnormal; Notable for the following components:       Result Value    WBC 1.5 (*)     RBC 3.35 (*)     Hemoglobin 10.0 (*)     Hematocrit 30.0 (*)     Neutrophils Absolute 1.2 (*)     Lymphocytes Absolute 0.3 (*)     All other components within normal limits    Narrative:     CALL  [facility] tel. [phone],  Hematology results called to and read back by Ally Greenwood, 03/02/2023  15:30, by [facility]  Hematology results called to and read back by DAMIEN Greenwood, 03/02/2023  14:58, by PROAS   COMPREHENSIVE METABOLIC PANEL - Abnormal; Notable for the following components:    Anion Gap 17 (*)     Glucose 129 (*)     BUN 68 (*)     Creatinine 3.6 (*)     Est, Glom Filt Rate 17 (*)     Total Bilirubin 1.6 (*)     All other components within normal limits   TROPONIN - Abnormal; Notable for the following components:    Troponin 0.04 (*)     All other components within normal limits   BRAIN NATRIURETIC PEPTIDE - Abnormal; Notable for the following components:    Pro-BNP 3,889 (*)     All other components within normal limits   COVID-19, RAPID   CULTURE, BLOOD 1   CULTURE, BLOOD 2   APTT   LACTATE, SEPSIS   LACTATE, SEPSIS EKG:    Sinus rhythm at a rate of 64 beats a minute with no acute ST elevations or depressions or pathologic Q waves. Normal axis. Exam:    Well-nourished male, nontoxic appearing in no acute distress. When I went into the room the patient was very cold and he had the chills and wanted a warmer blanket. Heart was regular rate rhythm with no murmurs rubs gallops. Chest was clear to auscultation bilaterally. Medical decision making:    The patient has remained stable throughout his hospital course. His work-up was extensive. The patient's symptoms appear to be consistent with rigors. He indeed developed a fever while he was here of 100.7. Blood cultures have been taken. His lactic acid was normal.  A chest CT shows some possible dependent atelectasis versus disease. I suspect the patient has an occult bacteremia. The patient be admitted. He is in stable condition. FINAL IMPRESSION:    1.  SIRS (systemic inflammatory response syndrome) ([facility])           Yared Thornton MD  03/02/23 1318

## 2023-03-03 LAB
ANION GAP SERPL CALCULATED.3IONS-SCNC: 11 MMOL/L (ref 3–16)
BASOPHILS ABSOLUTE: 0 K/UL (ref 0–0.2)
BASOPHILS RELATIVE PERCENT: 0.5 %
BUN BLDV-MCNC: 82 MG/DL (ref 7–20)
CALCIUM SERPL-MCNC: 8.4 MG/DL (ref 8.3–10.6)
CHLORIDE BLD-SCNC: 100 MMOL/L (ref 99–110)
CO2: 30 MMOL/L (ref 21–32)
CREAT SERPL-MCNC: 5.1 MG/DL (ref 0.8–1.3)
EOSINOPHILS ABSOLUTE: 0 K/UL (ref 0–0.6)
EOSINOPHILS RELATIVE PERCENT: 1.9 %
ESTIMATED AVERAGE GLUCOSE: 91.1 MG/DL
GFR SERPL CREATININE-BSD FRML MDRD: 11 ML/MIN/{1.73_M2}
GLUCOSE BLD-MCNC: 109 MG/DL (ref 70–99)
GLUCOSE BLD-MCNC: 119 MG/DL (ref 70–99)
GLUCOSE BLD-MCNC: 143 MG/DL (ref 70–99)
GLUCOSE BLD-MCNC: 79 MG/DL (ref 70–99)
HBA1C MFR BLD: 4.8 %
HCT VFR BLD CALC: 27 % (ref 40.5–52.5)
HCT VFR BLD CALC: 30 % (ref 40.5–52.5)
HEMATOLOGY PATH CONSULT: NORMAL
HEMATOLOGY PATH CONSULT: YES
HEMOGLOBIN: 10 G/DL (ref 13.5–17.5)
HEMOGLOBIN: 9.3 G/DL (ref 13.5–17.5)
LACTIC ACID: 2.6 MMOL/L (ref 0.4–2)
LYMPHOCYTES ABSOLUTE: 0.3 K/UL (ref 1–5.1)
LYMPHOCYTES RELATIVE PERCENT: 18.7 %
MCH RBC QN AUTO: 29.7 PG (ref 26–34)
MCH RBC QN AUTO: 30.4 PG (ref 26–34)
MCHC RBC AUTO-ENTMCNC: 33.2 G/DL (ref 31–36)
MCHC RBC AUTO-ENTMCNC: 34.3 G/DL (ref 31–36)
MCV RBC AUTO: 88.6 FL (ref 80–100)
MCV RBC AUTO: 89.5 FL (ref 80–100)
MONOCYTES ABSOLUTE: 0 K/UL (ref 0–1.3)
MONOCYTES RELATIVE PERCENT: 0.8 %
NEUTROPHILS ABSOLUTE: 1.2 K/UL (ref 1.7–7.7)
NEUTROPHILS RELATIVE PERCENT: 78.1 %
PDW BLD-RTO: 14.2 % (ref 12.4–15.4)
PDW BLD-RTO: 14.4 % (ref 12.4–15.4)
PERFORMED ON: ABNORMAL
PERFORMED ON: ABNORMAL
PERFORMED ON: NORMAL
PLATELET # BLD: 152 K/UL (ref 135–450)
PLATELET # BLD: 167 K/UL (ref 135–450)
PLATELET SLIDE REVIEW: ADEQUATE
PMV BLD AUTO: 6.7 FL (ref 5–10.5)
PMV BLD AUTO: 7.1 FL (ref 5–10.5)
POTASSIUM SERPL-SCNC: 4.4 MMOL/L (ref 3.5–5.1)
RBC # BLD: 3.04 M/UL (ref 4.2–5.9)
RBC # BLD: 3.35 M/UL (ref 4.2–5.9)
SLIDE REVIEW: ABNORMAL
SODIUM BLD-SCNC: 141 MMOL/L (ref 136–145)
VANCOMYCIN RANDOM: 7.5 UG/ML
WBC # BLD: 1.5 K/UL (ref 4–11)
WBC # BLD: 25.3 K/UL (ref 4–11)

## 2023-03-03 PROCEDURE — 2580000003 HC RX 258: Performed by: HOSPITALIST

## 2023-03-03 PROCEDURE — 36415 COLL VENOUS BLD VENIPUNCTURE: CPT

## 2023-03-03 PROCEDURE — 5A1D70Z PERFORMANCE OF URINARY FILTRATION, INTERMITTENT, LESS THAN 6 HOURS PER DAY: ICD-10-PCS | Performed by: INTERNAL MEDICINE

## 2023-03-03 PROCEDURE — 1200000000 HC SEMI PRIVATE

## 2023-03-03 PROCEDURE — 83036 HEMOGLOBIN GLYCOSYLATED A1C: CPT

## 2023-03-03 PROCEDURE — 51798 US URINE CAPACITY MEASURE: CPT

## 2023-03-03 PROCEDURE — 90935 HEMODIALYSIS ONE EVALUATION: CPT

## 2023-03-03 PROCEDURE — 83605 ASSAY OF LACTIC ACID: CPT

## 2023-03-03 PROCEDURE — 2580000003 HC RX 258: Performed by: INTERNAL MEDICINE

## 2023-03-03 PROCEDURE — 80202 ASSAY OF VANCOMYCIN: CPT

## 2023-03-03 PROCEDURE — 6360000002 HC RX W HCPCS: Performed by: INTERNAL MEDICINE

## 2023-03-03 PROCEDURE — 6360000002 HC RX W HCPCS: Performed by: HOSPITALIST

## 2023-03-03 PROCEDURE — 6370000000 HC RX 637 (ALT 250 FOR IP): Performed by: HOSPITALIST

## 2023-03-03 PROCEDURE — 85027 COMPLETE CBC AUTOMATED: CPT

## 2023-03-03 PROCEDURE — 80048 BASIC METABOLIC PNL TOTAL CA: CPT

## 2023-03-03 RX ADMIN — PANTOPRAZOLE SODIUM 40 MG: 40 TABLET, DELAYED RELEASE ORAL at 05:30

## 2023-03-03 RX ADMIN — CEFEPIME 1000 MG: 1 INJECTION, POWDER, FOR SOLUTION INTRAMUSCULAR; INTRAVENOUS at 18:04

## 2023-03-03 RX ADMIN — SODIUM CHLORIDE, PRESERVATIVE FREE 10 ML: 5 INJECTION INTRAVENOUS at 20:09

## 2023-03-03 RX ADMIN — SODIUM CHLORIDE, PRESERVATIVE FREE 10 ML: 5 INJECTION INTRAVENOUS at 10:50

## 2023-03-03 RX ADMIN — ASPIRIN 81 MG: 81 TABLET, COATED ORAL at 10:49

## 2023-03-03 RX ADMIN — FINASTERIDE 5 MG: 5 TABLET, FILM COATED ORAL at 10:49

## 2023-03-03 RX ADMIN — HEPARIN SODIUM 5000 UNITS: 5000 INJECTION INTRAVENOUS; SUBCUTANEOUS at 05:31

## 2023-03-03 RX ADMIN — INSULIN GLARGINE 4 UNITS: 100 INJECTION, SOLUTION SUBCUTANEOUS at 20:14

## 2023-03-03 RX ADMIN — LEVOTHYROXINE SODIUM 50 MCG: 100 TABLET ORAL at 05:30

## 2023-03-03 RX ADMIN — SODIUM CHLORIDE 25 ML: 9 INJECTION, SOLUTION INTRAVENOUS at 18:01

## 2023-03-03 RX ADMIN — CLOPIDOGREL BISULFATE 75 MG: 75 TABLET ORAL at 10:49

## 2023-03-03 RX ADMIN — HEPARIN SODIUM 5000 UNITS: 5000 INJECTION INTRAVENOUS; SUBCUTANEOUS at 20:09

## 2023-03-03 RX ADMIN — SODIUM CHLORIDE 25 ML: 9 INJECTION, SOLUTION INTRAVENOUS at 10:54

## 2023-03-03 RX ADMIN — VANCOMYCIN HYDROCHLORIDE 1250 MG: 10 INJECTION, POWDER, LYOPHILIZED, FOR SOLUTION INTRAVENOUS at 10:55

## 2023-03-03 RX ADMIN — ATORVASTATIN CALCIUM 80 MG: 80 TABLET, FILM COATED ORAL at 10:49

## 2023-03-03 NOTE — PROGRESS NOTES
Admission nurse at bedside in attempts to start admission patient stated he needed to use a urinal; urinal provided and stepped out of room for privacy. 55

## 2023-03-03 NOTE — PROGRESS NOTES
[facility]    3/3/2023 8:18 AM        Name: Magno Ward . Admitted: 3/2/2023  Primary Care Provider: Gina Phipps (Tel: [phone])      Subjective:  . Magno Ward is a 74 y.o. male with a past medical history of hypertension, hyperlipidemia,  hypothyroidism, cryptogenic CVA s/p ILR 2017 (removed)s, CAD s/p CABG x 3 2/21/2022 Dr. Bob Lawrence,  stents 9/2022 to RCA,  ESRD on HD, DM2 who presented from HD with leg cramps and CP. Reportedly had generalized body aches, fever, chills. 2 hours of HD was completed. Initial workup showed low WBC. Admitted for sepsis and started on Vancomycin and cefepime. WBC 25 3/3/2023. Interval History: Today, he is resting in bed comfortably. He denies new complaints or acute events overnight. He is actually requesting to go home today. Denies CP, SOB, swelling or palpitations. Reports he had brief episode of lower chest epigastric pain that resolved spontaneously prior to arriving at the hospital.  Reports this is not at all similar to past symptoms he had before CABG or stents. Independently reviewed interval ancillary notes from  nephrology . Problem List  Principal Problem:    Sepsis ([address]  Resolved Problems:    * No resolved hospital problems. *     Assessment and Plan:    Sepsis   - Leukocytosis, fever, chills   - Hold am BP medications    - Repeat Lactic 2.6 (1.7 on admission), discussed with nephrology and will not remove any fluid in HD today   - Continue cefepime and vancomycin (pharmacy to dose)   - Blood cultures pending, CXR with atelectasis, source of infection remains undetermined  Hypertension   - BP soft this am, hold antihypertensives   DM2   - Reviewed BG overnight.   Continues Lanus 4 units nightly and low-dose SSI  ESRD   - On HD, consult nephrology   Mild AS   - Stable on echo 8/3589   - faint systolic murmur on exam    Discussed care with patient and nursing  Discussed assessment, diagnostics, and plan of care with Dr. Jd Flynn, no additional recs    Diet: ADULT DIET; Regular; 5 carb choices (75 gm/meal);  Low Fat/Low Chol/High Fiber/WILMA; Low Sodium (2 gm)  Code:Full Code  DVT PPX: SQ heparin    Disposition: Home when medically able, denies needs    Current Medications  amLODIPine (NORVASC) tablet 10 mg, Daily  aspirin EC tablet 81 mg, Daily  atorvastatin (LIPITOR) tablet 80 mg, Daily  clopidogrel (PLAVIX) tablet 75 mg, Daily  finasteride (PROSCAR) tablet 5 mg, Daily  insulin glargine (LANTUS) injection vial 4 Units, Nightly  levothyroxine (SYNTHROID) tablet 50 mcg, Daily  losartan (COZAAR) tablet 100 mg, Daily  metoprolol tartrate (LOPRESSOR) tablet 25 mg, BID  pantoprazole (PROTONIX) tablet 40 mg, QAM AC  cefepime (MAXIPIME) 1,000 mg in sodium chloride 0.9 % 50 mL IVPB (mini-bag), Q24H  sodium chloride flush 0.9 % injection 5-40 mL, 2 times per day  sodium chloride flush 0.9 % injection 5-40 mL, PRN  0.9 % sodium chloride infusion, PRN  ondansetron (ZOFRAN-ODT) disintegrating tablet 4 mg, Q8H PRN   Or  ondansetron (ZOFRAN) injection 4 mg, Q6H PRN  polyethylene glycol (GLYCOLAX) packet 17 g, Daily PRN  acetaminophen (TYLENOL) tablet 650 mg, Q6H PRN   Or  acetaminophen (TYLENOL) suppository 650 mg, Q6H PRN  heparin (porcine) injection 5,000 Units, 3 times per day  dextrose bolus 10% 125 mL, PRN   Or  dextrose bolus 10% 250 mL, PRN  glucagon (rDNA) injection 1 mg, PRN  dextrose 10 % infusion, Continuous PRN  insulin lispro (HUMALOG) injection vial 0-4 Units, TID WC  insulin lispro (HUMALOG) injection vial 0-4 Units, Nightly  vancomycin (VANCOCIN) intermittent dosing (placeholder), RX Placeholder        Objective:  BP (!) 106/93   Pulse 65   Temp 98.8 °F (37.1 °C) (Oral)   Resp 18   Ht 5' 9\" (1.753 m)   Wt 176 lb 9.6 oz (80.1 kg)   SpO2 93%   BMI 26.08 kg/m²   Vitals:    03/03/23 0530   BP: (!) 106/93   Pulse: 65   Resp: 18   Temp: 98.8 °F (37.1 °C)   SpO2: 93%       Intake/Output Summary (Last 24 hours) at 3/3/2023 0818  Last data filed at 3/3/2023 0446  Gross per 24 hour   Intake 290 ml   Output 175 ml   Net 115 ml      Wt Readings from Last 3 Encounters:   03/02/23 176 lb 9.6 oz (80.1 kg)   06/27/17 212 lb 3.2 oz (96.3 kg)   03/13/17 221 lb (100.2 kg)     Review of Systems:  Constitutional: Negative for fever, weight changes, or weakness  Skin: Negative for bruising, bleeding, blood clots, or changes in skin pigment  HEENT: Negative for vision changes or dysphagia  Respiratory: Denies SOB, cough, recent URI  Cardiovascular:Denies syncope, dizziness or exertional CP  Gastrointestinal: Negative for abdominal pain, diarrhea, constipation, or black/tarry stools + vomiting x 1 in ER  Genito-Urinary: Negative for hematuria  Musculoskeletal: No focal weakness  Neurological/Psych: Negative for confusion or TIA-like symptoms. No anxiety, depression, or insomnia    Physical Examination:  Telemetry: Personally Reviewed Normal sinus rhythm  Constitutional: Cooperative and in no apparent distress, appears well nourished, No obesity  Skin: Warm and pink; no cyanosis, bruising, or clubbing, No lesions/incisions  HEENT: Symmetric and normocephalic. Conjunctiva pink with clear sclera. Mucus membranes pink and moist.   Cardiovascular: regular rate and rhythm. S1 & S2, positive for murmurs. Peripheral pulses 2+, No peripheral edema  Respiratory: Respirations symmetric and unlabored. Lungs clear to auscultation bilaterally, no wheezing, crackles, or rhonchi  Gastrointestinal: Abdomen soft and round. normal bowel sounds. No tenderness  Musculoskeletal: No focal weakness, muscle strength 5/5 bilaterally  Neurologic/Psych: Awake and orientated to person, place and time. Calm affect, appropriate mood.      Pertinent labs, diagnostic, and imaging results reviewed as a part of this visit    Labs and Tests:  CBC:   Recent Labs     03/02/23  1411 03/03/23  0539   WBC 1.5* 25.3*   HGB 10.0* 9.3*    167     BMP:    Recent Labs     03/02/23  1411 03/03/23  0539    141   K 3.7 4.4   CL 99 100   CO2 25 30   BUN 68* 82*   CREATININE 3.6* 5.1*   GLUCOSE 129* 119*     Hepatic:   Recent Labs     03/02/23  1411   AST 24   ALT 20   BILITOT 1.6*   ALKPHOS 54     Relevant results:  CXR: 3/2/2023  No radiographic evidence of acute pulmonary disease. CT: 3/2/2023  Dependent airspace disease at the lung bases, likely atelectasis. U/A: none    ECG: 3/2/2023: SR    Prior Studies:  Echocardiogram: 1/12/2023  Study Conclusions     - Left ventricle: The cavity size is normal. Wall thickness was increased in a pattern of mild LVH. Systolic function was normal. Features are consistent with a pseudonormal left ventricular filling     pattern, with concomitant abnormal relaxation and increased filling pressure (grade 2 diastolic     dysfunction). - Aortic valve: Mild thickening. Mild calcification. There is mild stenosis. The valve area by the     velocity-time integral method is 1.4cm^2. The valve area by the peak velocity method is 1.5cm^2. The valve area by the mean velocity method is 1.4cm^2. - Mitral valve: The valve area is 2.3cm^2. The valve area (LVOT continuity) is 2.3cm^2.   - Left atrium: The atrium is dilated. - Right ventricle: Systolic function was normal by visual assessment. LHC:     Left heart Cath(9/22)  IMPRESSIONS:     1. PCI to 90 % stenosis int he ostial and proximal PDA with 2 x 15 mm ANNIKA with excellent      angiographic results. PCI to mid RCA with 2.75 x 26 mm ANNIKA post dilated in the proximal stent with 3 x 12 mm non      compliant balloon taken to 12 atmospheres with excellent angiographic results. PCI to proximal RCA with 3.5 x 12 mm ANNIKA post dilated with a 3.5 x 12 mm non compliant balloon      inflated to 14 atmospheres with excellent angiographic results. 2. LVEDP was 13 mm Hg.    3. Left coronary system and bypass grafts were not engaged. RECOMMENDATIONS:  ASA 81 mg daily life long   Plavix 75 mg daily for six months, patient loaded with 600 mg   Plavix in the cath lab.    Optimize antianginals and aggressive therapy for coronary artery   disease   Continue high intensity statin   Cardiac Rehab   Femoral Hemostasis with angioseal. \"  Olga Felty, JAZMÍN - CNP   3/3/2023 8:18 AM

## 2023-03-03 NOTE — ED NOTES
Attempted to call report, 5T RN was in a pt's room, she agreed to call back when she was done.      Leeanna Melendrez RN  03/02/23 2041

## 2023-03-03 NOTE — DIALYSIS
Treatment time: 3 hours  Net UF: 900 ml     Pre weight: 81.6 kg  Post weight:81.6 kg      Access used: RF    Access function: Good with  ml/min          Regular outpatient schedule: MWF     Summary of response to treatment: Patient tolerated treatment Great and without any complications. Patient remained stable throughout entire treatment and upon the transporting back to unit and exiting the dialysis suite via transport. Copy of dialysis treatment record placed in chart, to be scanned into EMR.

## 2023-03-03 NOTE — PROGRESS NOTES
Patient seen in ED, room 07. Admission completed with the following exceptions:  4 Eyes Assessment, Immunizations, Covid Vaccines, Rights and Responsibilities, Orientation to room, Plan of Care, Education/Learning Assessment and Education Plan, white board, height and weight, pain assessment and head to toe assessment. Patient is alert and oriented X 4. Patient lives home with his wife and is being admitted for sepsis. Home Medications as well as Outside Sources has been verbally reviewed with patient and updated as appropriate and is now Completed pharmacy. Plan of care updated if indicated. All questions answered. Wife at bedside. No B/P or IV sticks in right arm due to fistula for dialysis access; patient and his wife report they are in the process of learning how to do dialysis at home.

## 2023-03-03 NOTE — CONSULTS
Clinical Pharmacy Note: Pharmacy to Dose Vancomycin    Parris Hinson is a 74 y.o. male started on Vancomycin for Sepsis; consult received from Dr. Philippe Hernandez to manage therapy. Also receiving the following antibiotics: cefepime. Goal AUC: 400-600 mg/L*hr  Goal Trough Level: <20 mcg/mL    Assessment/Plan:  A 750 mg loading dose was given on 3/2 at 1845. Patient w/ ESRD on HD. Repeat 1250 mg dose x1 now after lvl wnl this AM  A vancomycin random level has been ordered for 3/6 at 0600  Changes in regimen will be determined based on culture results, renal function, and clinical response. Pharmacy will continue to monitor and adjust regimen as necessary. Allergies:  Patient has no known allergies. Recent Labs     03/02/23  1411 03/03/23  0539   CREATININE 3.6* 5.1*       Recent Labs     03/02/23  1411 03/03/23  0539   WBC 1.5* 25.3*       Ht Readings from Last 1 Encounters:   03/02/23 5' 9\" (1.753 m)        Wt Readings from Last 1 Encounters:   03/02/23 176 lb 9.6 oz (80.1 kg)         Estimated Creatinine Clearance: 13 mL/min (A) (based on SCr of 5.1 mg/dL [facility])).       Thank you for the consult,    Michael Chambers PharmD, [address] [facility] Pharmacy Specialist  [id number]

## 2023-03-03 NOTE — DISCHARGE INSTR - COC
Continuity of Care Form    Patient Name: Chika Barone   :  1950  MRN:  [id number]    Admit date:  3/2/2023  Discharge date:  ***    Code Status Order: Full Code   Advance Directives:     Admitting Physician:  Jackquelyn Holstein, MD  PCP: Lashawn Hewitt    Discharging Nurse: [facility] Unit/Room#: 4HF-2030/0899-91  Discharging Unit Phone Number: ***    Emergency Contact:   Extended Emergency Contact Information  Primary Emergency Contact: Jessica Girard  Address: [address] Phone: [phone]  Mobile Phone: [phone]  Relation: None    Past Surgical History:  Past Surgical History:   Procedure Laterality Date    CARDIAC SURGERY      2022    COLONOSCOPY      last colonscopy was     DIALYSIS FISTULA CREATION Right     right arm fistula Dec. 2020    HAND SURGERY Right     carpal tunnel    KNEE ARTHROSCOPY Left     OTHER SURGICAL HISTORY      heart stents 2023       Immunization History:   Immunization History   Administered Date(s) Administered    COVID-19, MODERNA BLUE border, Primary or Immunocompromised, (age 12y+), IM, 100 mcg/0.5mL 2021, 2021, 2021       Active Problems:  Patient Active Problem List   Diagnosis Code    Type II or 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, uncontrolled(250.62) E11.49    Diabetic polyneuropathy ([facility] E11.42    Diabetes mellitus with background retinopathy ([address] E11.3299    Other and unspecified hyperlipidemia E78.5    Type II or 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, not stated as uncontrolled(250.60) E11.49    Nephritis and nephropathy, not specified as acute or chronic, with other specified pathological lesion in kidney, in diseases classified elsewhere N05.8    Sepsis ([facility] A41.9       Isolation/Infection:   Isolation            No Isolation          Patient Infection Status       Infection Onset Added Last Indicated Last Indicated By Review Planned Expiration Resolved Resolved By    C-diff Rule Out 23 Clostridium difficile toxin/antigen (Ordered) 03/10/23 03/13/23      Resolved    COVID-19 (Rule Out) 23 COVID-19, Rapid (Ordered)   23 Rule-Out Test Resulted            Nurse Assessment:  Last Vital Signs: BP (!) 120/50   Pulse 65   Temp 97.9 °F (36.6 °C)   Resp 17   Ht 5' 9\" (1.753 m)   Wt 179 lb 14.3 oz (81.6 kg)   SpO2 95%   BMI 26.57 kg/m²     Last documented pain score (0-10 scale):    Last Weight:   Wt Readings from Last 1 Encounters:   23 179 lb 14.3 oz (81.6 kg)     Mental Status:  {IP PT MENTAL STATUS:01317}    IV Access:  { JENNIE IV ACCESS:649410767}    Nursing Mobility/ADLs:  Walking   {CHP DME SVJF:044698145}  Transfer  {CHP DME ZRTW:342352583}  Bathing  {CHP DME JDBB:585891067}  Dressing  {CHP DME YEDE:172441183}  Toileting  {CHP DME IVQB:301045632}  Feeding  {CHP DME VIO}  Med Admin  {P DME HKPC:729853121}  Med Delivery   { JENNIE MED Delivery:958012298}    Wound Care Documentation and Therapy:        Elimination:  Continence: Bowel: {YES / TP:15652}  Bladder: {YES / CF:14844}  Urinary Catheter: {Urinary Catheter:364801396}   Colostomy/Ileostomy/Ileal Conduit: {YES / FP:14042}       Date of Last BM: ***    Intake/Output Summary (Last 24 hours) at 3/3/2023 1508  Last data filed at 3/3/2023 0446  Gross per 24 hour   Intake 290 ml   Output 175 ml   Net 115 ml     I/O last 3 completed shifts:   In: 290 [P.O.:240; IV Piggyback:50]  Out: 175 [Urine:175]    Safety Concerns:     [address] JENNIE Safety Concerns:966374670}    Impairments/Disabilities:      [address] SCHNEIDER Impairments/Disabilities:407204295}    Nutrition Therapy:  Current Nutrition Therapy:   [address] JENNIE Diet List:072654738}    Routes of Feeding: {CHP DME Other Feedings:499129806}  Liquids: {Slp liquid thickness:68033}  Daily Fluid Restriction: {CHP DME Yes amt IJZHNKT:847210107}  Last Modified Barium Swallow with Video (Video Swallowing Test): {Done Not Done GOBN:242415275}    Treatments at the Time of Hospital Discharge:   Respiratory Treatments: ***  Oxygen Therapy:  {Therapy; copd oxygen:32597}  Ventilator:    {MH CC Vent YVHE:294940234}    Rehab Therapies: {THERAPEUTIC INTERVENTION:7785516971}  Weight Bearing Status/Restrictions: 50Judah ZAMBRANO Weight Bearin}  Other Medical Equipment (for information only, NOT a DME order):  {EQUIPMENT:406745083}  Other Treatments: ***    Patient's personal belongings (please select all that are sent with patient):  {CHP DME Belongings:945740303}    RN SIGNATURE:  {Esignature:733611997}    CASE MANAGEMENT/SOCIAL WORK SECTION    Inpatient Status Date: 3/2/23    Readmission Risk Assessment Score: 15  Readmission Risk              Risk of Unplanned Readmission:  15           Discharging to Facility/ Agency   Name:   Address:  Phone:  Fax:    Dialysis Facility (if applicable)   [facility] [address]  Phone: [phone]   Fax: [phone]  Patient Schedule: ///          / signature: Electronically signed by Eliana Mai RN on 3/3/23 at 3:08 PM EST    PHYSICIAN SECTION    Prognosis: {Prognosis:3689596585}    Condition at Discharge: 50Judah Du Patient Condition:914604889}    Rehab Potential (if transferring to Rehab): {Prognosis:2259467365}    Recommended Labs or Other Treatments After Discharge: ***    Physician Certification: I certify the above information and transfer of Rita Haq  is necessary for the continuing treatment of the diagnosis listed and that he requires {Admit to Appropriate Level of Care:66559} for {GREATER/LESS:756334003} 30 days.      Update Admission H&P: {CHP DME Changes in NTLCW:102372990}    PHYSICIAN SIGNATURE:  {Esignature:797997599}

## 2023-03-03 NOTE — PROGRESS NOTES
4 Eyes Skin Assessment     NAME:  Amrit Owens  YOB: 1950  MEDICAL RECORD NUMBER:  [id number]    The patient is being assessed for  Admission    I agree that One RN has performed a thorough Head to Toe Skin Assessment on the patient. ALL assessment sites listed below have been assessed. Areas assessed by both nurses:    Head, Face, Ears, Shoulders, Back, Chest, Arms, Elbows, Hands, Sacrum. Buttock, Coccyx, Ischium, and Legs. Feet and Heels        Does the Patient have a Wound?  No noted wound(s)       Shashi Prevention initiated by RN: NA   Wound Care Orders initiated by RN: NA    Pressure Injury (Stage 3,4, Unstageable, DTI, NWPT, and Complex wounds) if present, place referral order by RN under : NA    New and Established Ostomies, if present place, referral order under : NA      Nurse 1 eSignature: Electronically signed by Dhiraj Choudhury RN on 3/2/23 at 10:49 PM EST    **SHARE this note so that the co-signing nurse can place an eSignature**    Nurse 2 eSignature: Electronically signed by Eagle Menendez RN on 3/3/23 at 7:01 AM EST

## 2023-03-03 NOTE — PROGRESS NOTES
MD Emma Porras MD Donita Rower, MD                Office: [phone]                      Fax: [phone]          Inpatient Dialysis Progress Note:     PATIENT NAME: aSad Lund  : 1950  MRN: [id number]    Indication for Dialysis: ESRD   Patient seen on dialysis treatment. Tolerating treatment  Fairly well    Vitals:    23 1045   BP: (!) 100/59   Pulse: 62   Resp: 16   Temp: 98.1 °F (36.7 °C)   SpO2: 95%       Awake, co-operative   Neck: Supple. no JVD. Cardiac: S1 and S2+, No pericardial rub. Chest: Normal respiratory effort,  Rales. No rhonchi  Ext :  LE Edema , no cynosis    Vascular Access:     Permanent Vascular access:  RIGHT upper extremity AV: Access site demonstrates: normal thrill/bruit; no pseudoaneurysm, redness, pain or discharge         Labs Reviewed  by me   Labs   Lab Results   Component Value Date    CREATININE 5.1 (HH) 2023    BUN 82 (HH) 2023     2023    K 4.4 2023     2023    CO2 30 2023     Lab Results   Component Value Date    WBC 25.3 (H) 2023    HGB 9.3 (L) 2023    HCT 27.0 (L) 2023    MCV 88.6 2023     2023       Dialysis Treatment and Prescription reviewed    RX:  See dialysis flowsheet for specifics on access, blood flow rate, dialysate baths, duration of dialysis, anticoagulation and other technical information. COMMENTS:  Stable on dialysis. Continue to Target dry weight and clearance. Monitor closely for any hypotension    : Tolerating dialysis well, with no complications. Hypotension on dialysis-stable to improved. Decrease HD temp 35 /c, as BP soft 100/60s    Good flow access. :Anemia. Stable.     :Fluid Overload. Stable    Fluid removal not needed today    :Stable from Renal.  Continue out patient Dialysis treatments. Please refer to the orders.    Dialysis treatment plan and dialysis orders discussed with dialysis RN   at bedside. Discussed with Patient. Thank you for allowing me to participate in this patient's care. Please do not hesitate to contact me for any questions/concerns. We will follow along with you.      Jen Corona MD       [facility] [address] [place] [address]   Office: [phone] or [address]  Fax: [phone]

## 2023-03-03 NOTE — CONSULTS
MD Calvin Albarran MD Leotis Manes, MD                Office: [phone]                      Fax: [phone]          NEPHROLOGY INITIAL CONSULT NOTE:     PATIENT NAME: Sly Driscoll  : 1950  MRN: [id number]  REASON FOR CONSULT: I am asked to see this patient in consultation for my opinion regarding management of ESRD. My recommendations will be communicated by way of shared medical record. IMPRESSION / RECOMMENDATION:      Admitted on:  3/2/2023  For:  SIRS (systemic inflammatory response syndrome) ([facility] [address] [R65.10]  Sepsis ([facility] [address] [A41.9]      1. ESRD on home-HD training-: [facility].   -Was supposed to start doing HD at home this week. with his wife. - outpt HD center: [facility] , Dr. Jorge Clancy   - Access: Rt arm AVF : (+) T/B    - next HD  today   -Likely will not need more dialysis over this weekend, but follow-up closely    2. Hypertension/Volume Status:  - BP on the lower side, hold BP meds,-off amlodipine, losartan, beta-blocker,  - Na controlled  - EDW obtain records kg :    -Avoid fluid removal today due to concerns for sepsis, leukocytosis, elevated lactate,  - Follow-up blood culture  - Follow-up urinalysis  - CT scan of chest, no major infection  - IV antibiotics per primary team,   -- IV antibiotic: Vancomycin: trough goal <15, pharmacy team assisting. 3. Electrolytes/acid-base:  K:  controlled   High AG metabolic acidosis: controlled   Lactic acidosis mild, with sepsis, follow-up with dialysis,    4. Anemia of renal failure: close goal  - Iron: Avoid with concern for sepsis  - RAEANN: Not needed for the    5. BMD:  - Phos, calcium - follow in morning labs  - follow iPTH outpt      : Other supportive care :   - Check daily renal function panel with electrolytes-phosphorus  - Strict monitoring of I/Os, daily weight  - Renal feeds/diet  - Current medications reviewed. - Nephrotoxic medications have been discontinued. - Dose adjusted and appropriate. - Dose meds for eGFR <15 mL/min/1.73m2.    - Avoid heavy opioids due to renal failure - may use very low dose dilaudid / fentanyl with close monitoring of CNS and respiratory depression.       - Zosyn: 2.25 g every 12 hours (2.25 g every 8 hours for hospital-acquired or ventilator-associated pneumonia); Hemodialysis removes 30% to 40% of a piperacillin/tazobactam dose. Administer scheduled doses after hemodialysis on dialysis days. - Vancomycin: administer after hemodialysis on dialysis days: loading dose of 15 to 25 mg/kg, maintenance 500 to 1,000 mg or 5 to 10 mg/kg after each dialysis session.    - Meropenem: and its metabolite are readily dialyzable: 500 mg every 24 hours       Other Problems:  Hospital Problems             Last Modified POA    * (Principal) Sepsis ([facility] [address] 3/2/2023 Yes       Please refer to orders. Multiple complex problems. High risk  Discussed with patient, and treatment team   , his wife   Thank you for allowing me to participate in this patient's care. Please do not hesitate to contact me with any questions/concerns. We will follow along with you. Melia Payan MD  [facility] [address] [place] [address] [place]: [phone] or [address] [employer]  Fax: [phone]    Time spent  ~ 35 minutes that included face-to-face meeting/discussion with patient, patient's family , and treatment team (including primary/referring team and other consultants; included coordination of care with the treatment team; and review of patient's electronic medical records and ordering appropriates tests.       ===========================================  ===========================================      CHIEF COMPLAINT:   Chief Complaint   Patient presents with    Chest Pain     From dialysis by [facility]. Started cp 20 min pta and decrease when sits up. C/o cramping in legs     History Obtained From:  patient, spouse + treatment team + Electronic Medical Records. HPI: Mr. Bri Jc is a 74 y.o. male with significant past medical history of End stage renal disease, and   Past Medical History:   Diagnosis Date    Fainted 06/2017    High cholesterol     Hypertension     Hypothyroid     Kidney disease     dialysis: Right arm fistula; states will be doing dialysis at home    Type II or unspecified type diabetes mellitus without mention of complication, not stated as uncontrolled     ,   presents with Chest Pain (From dialysis by [facility]. Started cp 20 min pta and decrease when sits up. C/o cramping in legs)  Admitted with SIRS (systemic inflammatory response syndrome) ([facility] [address] [R65.10]  Sepsis ([facility] [address] [A41.9]  We are called for ESRD care. Patient has been getting training for home hemodialysis outpatient, at dialysis center with [facility]. He has plan to start dialysis at home this upcoming week but came in with chest pain. Currently being admitted with sepsis, with severe leukocytosis, lactic acidosis,   No current active complaints. Patient denied chest pain / dizziness/lightheadedness/syncope/ SOB / leg edema. Re: ESRD  Duration (when): Chronic   Location (where): kidneys  Severity (ex: CKD stage): End stage  Timing (ex: continuous, intermittent): intermittent HD  Context (ex: related to condition): presumed DM / HTN related.   Modifying factors (ex: medications, interventions): dialysis support  Associated signs & symptoms (ex: edema, SOB): Refer to HPI and Chief complaint    Past medical, surgical, social and family medial history reviewed by me:   PAST MEDICAL HISTORY:   Past Medical History:   Diagnosis Date    Fainted 06/2017    High cholesterol     Hypertension     Hypothyroid     Kidney disease     dialysis: Right arm fistula; states will be doing dialysis at home    Type II or unspecified type diabetes mellitus without mention of complication, not stated as uncontrolled      PAST SURGICAL HISTORY:   Past Surgical History:   Procedure Laterality Date CARDIAC SURGERY      Jan 2022    COLONOSCOPY      last colonscopy was 2022    DIALYSIS FISTULA CREATION Right     right arm fistula Dec. 2020    HAND SURGERY Right     carpal tunnel    KNEE ARTHROSCOPY Left     OTHER SURGICAL HISTORY      heart stents Jan 2023     FAMILY HISTORY:   Family History   Problem Relation Age of Onset    Cancer Mother         colon    Heart Disease Father         CHF    Stroke Father         mini strokes    Heart Disease Sister     Kidney Disease Sister     Diabetes Neg Hx     High Blood Pressure Neg Hx     Osteoporosis Neg Hx     Thyroid Disease Neg Hx      SOCIAL HISTORY:   Social History     Socioeconomic History    Marital status:      Spouse name: Felisha Mcneil    Number of children: 2    Years of education: [address]    Highest education level: None   Occupational History    Occupation: retired   Tobacco Use    Smoking status: Never    Smokeless tobacco: Never   Vaping Use    Vaping Use: Never used   Substance and Sexual Activity    Alcohol use: Not Currently    Drug use: No    Sexual activity: Yes     Partners: Female         MEDICATIONS reviewed by me:  Prior to Admission Medications:  No current facility-administered medications on file prior to encounter.      Current Outpatient Medications on File Prior to Encounter   Medication Sig Dispense Refill    atorvastatin (LIPITOR) 80 MG tablet Take 80 mg by mouth daily      Cholecalciferol (VITAMIN D3) 125 MCG (5000 UT) TABS Take 1 tablet by mouth daily      clopidogrel (PLAVIX) 75 MG tablet Take 75 mg by mouth daily      amLODIPine (NORVASC) 10 MG tablet Take 10 mg by mouth daily      Dulaglutide (TRULICITY) 4.5 WG/5.4XS SOPN Inject 4.5 mg into the skin once a week      finasteride (PROSCAR) 5 MG tablet Take 5 mg by mouth daily      Icosapent Ethyl (VASCEPA) 1 g CAPS capsule Take 1 capsule by mouth daily      levothyroxine (SYNTHROID) 50 MCG tablet Take 50 mcg by mouth Daily      metoprolol tartrate (LOPRESSOR) 25 MG tablet Take 25 mg by mouth 2 times daily      pantoprazole (PROTONIX) 40 MG tablet Take 40 mg by mouth daily      polyethylene glycol (GLYCOLAX) 17 GM/SCOOP powder Take 17 g by mouth daily      sevelamer hcl (RENAGEL) 800 MG tablet Take 800 mg by mouth 3 times daily (with meals)      lidocaine-prilocaine (EMLA) 2.5-2.5 % cream Apply topically as needed for Pain Apply topically as needed. Insulin Pen Needle ([employer] PEN NEEDLES) 31G X 6 MM MISC 1 each by Does not apply route 5 times daily 300 each 0    glucose blood VI test strips (ACCU-CHEK SMARTVIEW) strip 1 each by In Vitro route 5 times daily As needed. 150 each 1    ACCU-CHEK FASTCLIX LANCETS MISC 1 each by Does not apply route 5 times daily 150 each 1    ergocalciferol (DRISDOL) 11238 UNITS capsule Take 1 capsule by mouth once a week (Patient not taking: Reported on 3/2/2023) 12 capsule 2    losartan (COZAAR) 100 MG tablet Take 100 mg by mouth daily      atorvastatin (LIPITOR) 40 MG tablet Take 40 mg by mouth daily (Patient not taking: Reported on 3/2/2023)      cloNIDine (CATAPRES) 0.2 MG tablet Take 0.2 mg by mouth 2 times daily      furosemide (LASIX) 20 MG tablet Take 20 mg by mouth 2 times daily      insulin glargine (LANTUS) 100 UNIT/ML injection vial Inject 4-12 Units into the skin nightly      DHA-Vitamin C-Lutein (EYE HEALTH FORMULA PO) Take by mouth (Patient not taking: Reported on 3/2/2023)      B Complex-C-Folic Acid (STRESS FORMULA PO) Take by mouth      Multiple Vitamin (MULTIVITAMIN PO) Take 1 tablet by mouth daily. aspirin EC 81 MG EC tablet Take 1 tablet by mouth daily.  30 tablet 11       Medications Prior to Admission: atorvastatin (LIPITOR) 80 MG tablet, Take 80 mg by mouth daily  Cholecalciferol (VITAMIN D3) 125 MCG (5000 UT) TABS, Take 1 tablet by mouth daily  clopidogrel (PLAVIX) 75 MG tablet, Take 75 mg by mouth daily  amLODIPine (NORVASC) 10 MG tablet, Take 10 mg by mouth daily  Dulaglutide (TRULICITY) 4.5 TV/2.2SI SOPN, Inject 4.5 mg into the skin once a week  finasteride (PROSCAR) 5 MG tablet, Take 5 mg by mouth daily  Icosapent Ethyl (VASCEPA) 1 g CAPS capsule, Take 1 capsule by mouth daily  levothyroxine (SYNTHROID) 50 MCG tablet, Take 50 mcg by mouth Daily  metoprolol tartrate (LOPRESSOR) 25 MG tablet, Take 25 mg by mouth 2 times daily  pantoprazole (PROTONIX) 40 MG tablet, Take 40 mg by mouth daily  polyethylene glycol (GLYCOLAX) 17 GM/SCOOP powder, Take 17 g by mouth daily  sevelamer hcl (RENAGEL) 800 MG tablet, Take 800 mg by mouth 3 times daily (with meals)  lidocaine-prilocaine (EMLA) 2.5-2.5 % cream, Apply topically as needed for Pain Apply topically as needed. Insulin Pen Needle ([employer] PEN NEEDLES) 31G X 6 MM MISC, 1 each by Does not apply route 5 times daily  glucose blood VI test strips (ACCU-CHEK SMARTVIEW) strip, 1 each by In Vitro route 5 times daily As needed. ACCU-CHEK FASTCLIX LANCETS MISC, 1 each by Does not apply route 5 times daily  ergocalciferol (DRISDOL) 09149 UNITS capsule, Take 1 capsule by mouth once a week (Patient not taking: Reported on 3/2/2023)  losartan (COZAAR) 100 MG tablet, Take 100 mg by mouth daily  atorvastatin (LIPITOR) 40 MG tablet, Take 40 mg by mouth daily (Patient not taking: Reported on 3/2/2023)  cloNIDine (CATAPRES) 0.2 MG tablet, Take 0.2 mg by mouth 2 times daily  furosemide (LASIX) 20 MG tablet, Take 20 mg by mouth 2 times daily  insulin glargine (LANTUS) 100 UNIT/ML injection vial, Inject 4-12 Units into the skin nightly  DHA-Vitamin C-Lutein (EYE HEALTH FORMULA PO), Take by mouth (Patient not taking: Reported on 3/2/2023)  B Complex-C-Folic Acid (STRESS FORMULA PO), Take by mouth  Multiple Vitamin (MULTIVITAMIN PO), Take 1 tablet by mouth daily. aspirin EC 81 MG EC tablet, Take 1 tablet by mouth daily.      Inpatient Medications:  Scheduled Meds:   [Held by provider] amLODIPine  10 mg Oral Daily    aspirin EC  81 mg Oral Daily    atorvastatin  80 mg Oral Daily    clopidogrel  75 mg Oral Daily finasteride  5 mg Oral Daily    insulin glargine  4 Units SubCUTAneous Nightly    levothyroxine  50 mcg Oral Daily    [Held by provider] losartan  100 mg Oral Daily    [Held by provider] metoprolol tartrate  25 mg Oral BID    pantoprazole  40 mg Oral QAM AC    cefepime  1,000 mg IntraVENous Q24H    sodium chloride flush  5-40 mL IntraVENous 2 times per day    heparin (porcine)  5,000 Units SubCUTAneous 3 times per day    insulin lispro  0-4 Units SubCUTAneous TID WC    insulin lispro  0-4 Units SubCUTAneous Nightly    vancomycin (VANCOCIN) intermittent dosing (placeholder)   Other RX Placeholder     Continuous Infusions:   sodium chloride 25 mL (03/03/23 1054)    dextrose       PRN Meds:.sodium chloride flush, sodium chloride, ondansetron **OR** ondansetron, polyethylene glycol, acetaminophen **OR** acetaminophen, dextrose bolus **OR** dextrose bolus, glucagon (rDNA), dextrose    Allergies: Patient has no known allergies. REVIEW OF SYSTEMS:  As mentioned in HPI and CC  All other 10-point review of systems: negative.         PHYSICAL EXAM:  Patient Vitals for the past 24 hrs:   BP Temp Temp src Pulse Resp SpO2 Height Weight   03/03/23 1132 -- -- -- -- -- -- 5' 9\" (1.753 m) 180 lb (81.6 kg)   03/03/23 1045 (!) 100/59 98.1 °F (36.7 °C) Oral 62 16 95 % -- --   03/03/23 0530 (!) 106/93 98.8 °F (37.1 °C) Oral 65 18 93 % -- --   03/03/23 0004 109/60 99.3 °F (37.4 °C) Oral 80 16 92 % -- --   03/02/23 2204 129/71 99.3 °F (37.4 °C) Oral 89 18 93 % -- --   03/02/23 2200 -- -- -- -- -- -- -- 176 lb 9.6 oz (80.1 kg)   03/02/23 2030 -- -- -- 93 -- 91 % -- --   03/02/23 2015 -- -- -- 91 -- 90 % -- --   03/02/23 2000 -- -- -- (!) 115 -- 92 % -- --   03/02/23 1945 -- -- -- 91 -- 91 % -- --   03/02/23 1930 -- -- -- 93 -- 91 % -- --   03/02/23 1915 -- -- -- 92 -- 91 % -- --   03/02/23 1756 -- (!) 100.7 °F (38.2 °C) Oral -- -- -- -- --   03/02/23 1341 137/65 96.8 °F (36 °C) Temporal 76 16 96 % 5' 9\" (1.753 m) 176 lb (79.8 kg) Intake/Output Summary (Last 24 hours) at 3/3/2023 1232  Last data filed at 3/3/2023 0446  Gross per 24 hour   Intake 290 ml   Output 175 ml   Net 115 ml       Physical Exam  General: Awake, Alert,   HENT: Atraumatic, normocephalic   Eyes: Normal conjunctiva, Non-incteral sclera. Neck: Supple, JVD not visible. CVS:  Heart sounds are normal. No loud murmur. RS: Normal respiratory effort, Breat sound: diminished at bases. Abd: Soft , bowel sounds are normal, no distension and no tenderness . Skin: No rash , some bruises,   CNS: Awake Oriented , No focal.   Extremities/MSK: mild  Edema, no cyanosis. Vascular Access:      Permanent Vascular access:  RIGHT  upper extremity AV. Access site demonstrates: normal thrill/bruit; no signs of pseudoaneurysm, redness, tenderness or discharge          DATA:  Diagnostic tests reviewed for today's visit:    Recent Labs     03/02/23  1411 03/03/23  0539   WBC 1.5* 25.3*   HCT 30.0* 27.0*    167     Iron Saturation:  No components found for: PERCENTFE  FERRITIN:  No results found for: FERRITIN  IRON:  No results found for: IRON  TIBC:  No results found for: TIBC    Recent Labs     03/02/23  1411 03/03/23  0539    141   K 3.7 4.4   CL 99 100   CO2 25 30   BUN 68* 82*   CREATININE 3.6* 5.1*     Recent Labs     03/02/23  1411 03/03/23  0539   CALCIUM 9.0 8.4     No results for input(s): PH, PCO2, PO2 in the last 72 hours. Invalid input(s): [id number], INSPIREDO2           BELOW MENTIONED RADIOLOGY STUDY RESULTS REVIEWED BY ME:    CT CHEST WO CONTRAST    Result Date: 3/2/2023  EXAMINATION: CT OF THE CHEST WITHOUT CONTRAST 3/2/2023 4:43 pm TECHNIQUE: CT of the chest was performed without the administration of intravenous contrast. Multiplanar reformatted images are provided for review.  Automated exposure control, iterative reconstruction, and/or weight based adjustment of the mA/kV was utilized to reduce the radiation dose to as low as reasonably achievable. COMPARISON: Correlation with concurrent chest radiograph HISTORY: Acute cough and rigors. FINDINGS: Mediastinum: Post CABG changes. Heart is not enlarged. No pericardial effusion. Thoracic aorta is normal caliber. No lymphadenopathy. Thyroid and esophagus are unremarkable. Lungs/pleura: Dependent airspace disease at the lung bases. No significant pleural effusion. No pneumothorax. Calcified granulomas at the left lung base. Upper Abdomen: Unremarkable. Soft Tissues/Bones: Metallic density is seen along right upper arm. Mild gynecomastia. Osseous structures are unremarkable. Dependent airspace disease at the lung bases, likely atelectasis. XR CHEST PORTABLE    Result Date: 3/2/2023  EXAMINATION: ONE XRAY VIEW OF THE CHEST 3/2/2023 2:32 pm COMPARISON: None. HISTORY: ORDERING SYSTEM PROVIDED HISTORY: SOB TECHNOLOGIST PROVIDED HISTORY: Reason for exam:->SOB Reason for Exam: Chest Pain (From dialysis by [place]. Started cp 20 min pta and decrease when sits up. C/o cramping in legs) FINDINGS: HEART/MEDIASTINUM: The cardiomediastinal silhouette is within normal limits. PLEURA/LUNGS: There are no focal consolidations or pleural effusions. There is no appreciable pneumothorax. BONES/SOFT TISSUE: No acute abnormality. There are median sternotomy wires. No radiographic evidence of acute pulmonary disease. This report was transcribed using voice recognition software, mainly. So please excuse brevity and/or typos. Every effort was made to ensure accuracy, however, inadvertent computerized transcription errors may be present. Please contact us, if any questions or clarifications are needed.

## 2023-03-04 LAB
ALBUMIN SERPL-MCNC: 3.4 G/DL (ref 3.4–5)
ANION GAP SERPL CALCULATED.3IONS-SCNC: 8 MMOL/L (ref 3–16)
BACTERIA: ABNORMAL /HPF
BILIRUBIN URINE: NEGATIVE
BLOOD, URINE: NEGATIVE
BUN BLDV-MCNC: 53 MG/DL (ref 7–20)
CALCIUM SERPL-MCNC: 8.4 MG/DL (ref 8.3–10.6)
CELLULAR CASTS: ABNORMAL /LPF
CHLORIDE BLD-SCNC: 98 MMOL/L (ref 99–110)
CLARITY: ABNORMAL
CO2: 30 MMOL/L (ref 21–32)
COLOR: ABNORMAL
COMMENT UA: ABNORMAL
CREAT SERPL-MCNC: 4.1 MG/DL (ref 0.8–1.3)
EPITHELIAL CELLS, UA: 14 /HPF (ref 0–5)
FINE CASTS, UA: ABNORMAL /LPF (ref 0–2)
GFR SERPL CREATININE-BSD FRML MDRD: 14 ML/MIN/{1.73_M2}
GLUCOSE BLD-MCNC: 78 MG/DL (ref 70–99)
GLUCOSE BLD-MCNC: 79 MG/DL (ref 70–99)
GLUCOSE BLD-MCNC: 85 MG/DL (ref 70–99)
GLUCOSE BLD-MCNC: 94 MG/DL (ref 70–99)
GLUCOSE BLD-MCNC: 94 MG/DL (ref 70–99)
GLUCOSE URINE: NEGATIVE MG/DL
HCT VFR BLD CALC: 28 % (ref 40.5–52.5)
HEMOGLOBIN: 9.2 G/DL (ref 13.5–17.5)
HYALINE CASTS: 16 /LPF (ref 0–8)
KETONES, URINE: ABNORMAL MG/DL
LEUKOCYTE ESTERASE, URINE: ABNORMAL
MCH RBC QN AUTO: 29.3 PG (ref 26–34)
MCHC RBC AUTO-ENTMCNC: 32.8 G/DL (ref 31–36)
MCV RBC AUTO: 89.2 FL (ref 80–100)
MICROSCOPIC EXAMINATION: YES
NITRITE, URINE: NEGATIVE
PDW BLD-RTO: 14 % (ref 12.4–15.4)
PERFORMED ON: NORMAL
PH UA: 5 (ref 5–8)
PHOSPHORUS: 3.9 MG/DL (ref 2.5–4.9)
PLATELET # BLD: 173 K/UL (ref 135–450)
PMV BLD AUTO: 7.2 FL (ref 5–10.5)
POTASSIUM SERPL-SCNC: 3.6 MMOL/L (ref 3.5–5.1)
PROTEIN UA: 100 MG/DL
RBC # BLD: 3.14 M/UL (ref 4.2–5.9)
RBC UA: 1 /HPF (ref 0–4)
RENAL EPITHELIAL, UA: ABNORMAL /HPF (ref 0–1)
SODIUM BLD-SCNC: 136 MMOL/L (ref 136–145)
SPECIFIC GRAVITY UA: 1.01 (ref 1–1.03)
URINE TYPE: ABNORMAL
UROBILINOGEN, URINE: 0.2 E.U./DL
WBC # BLD: 21.4 K/UL (ref 4–11)
WBC UA: 5 /HPF (ref 0–5)

## 2023-03-04 PROCEDURE — 81001 URINALYSIS AUTO W/SCOPE: CPT

## 2023-03-04 PROCEDURE — 36415 COLL VENOUS BLD VENIPUNCTURE: CPT

## 2023-03-04 PROCEDURE — 94760 N-INVAS EAR/PLS OXIMETRY 1: CPT

## 2023-03-04 PROCEDURE — 51798 US URINE CAPACITY MEASURE: CPT

## 2023-03-04 PROCEDURE — 1200000000 HC SEMI PRIVATE

## 2023-03-04 PROCEDURE — 85027 COMPLETE CBC AUTOMATED: CPT

## 2023-03-04 PROCEDURE — 87086 URINE CULTURE/COLONY COUNT: CPT

## 2023-03-04 PROCEDURE — 6360000002 HC RX W HCPCS: Performed by: HOSPITALIST

## 2023-03-04 PROCEDURE — 2580000003 HC RX 258: Performed by: HOSPITALIST

## 2023-03-04 PROCEDURE — 6370000000 HC RX 637 (ALT 250 FOR IP): Performed by: HOSPITALIST

## 2023-03-04 PROCEDURE — 6370000000 HC RX 637 (ALT 250 FOR IP): Performed by: NURSE PRACTITIONER

## 2023-03-04 PROCEDURE — 80069 RENAL FUNCTION PANEL: CPT

## 2023-03-04 RX ORDER — LOSARTAN POTASSIUM 100 MG/1
100 TABLET ORAL DAILY
Status: DISCONTINUED | OUTPATIENT
Start: 2023-03-04 | End: 2023-03-04

## 2023-03-04 RX ORDER — LOSARTAN POTASSIUM 25 MG/1
50 TABLET ORAL DAILY
Status: DISCONTINUED | OUTPATIENT
Start: 2023-03-05 | End: 2023-03-04

## 2023-03-04 RX ORDER — LOSARTAN POTASSIUM 25 MG/1
50 TABLET ORAL DAILY
Status: DISCONTINUED | OUTPATIENT
Start: 2023-03-04 | End: 2023-03-05

## 2023-03-04 RX ADMIN — ONDANSETRON 4 MG: 4 TABLET, ORALLY DISINTEGRATING ORAL at 04:37

## 2023-03-04 RX ADMIN — HEPARIN SODIUM 5000 UNITS: 5000 INJECTION INTRAVENOUS; SUBCUTANEOUS at 20:29

## 2023-03-04 RX ADMIN — ASPIRIN 81 MG: 81 TABLET, COATED ORAL at 08:54

## 2023-03-04 RX ADMIN — SODIUM CHLORIDE, PRESERVATIVE FREE 10 ML: 5 INJECTION INTRAVENOUS at 20:28

## 2023-03-04 RX ADMIN — ATORVASTATIN CALCIUM 80 MG: 80 TABLET, FILM COATED ORAL at 08:54

## 2023-03-04 RX ADMIN — SODIUM CHLORIDE, PRESERVATIVE FREE 10 ML: 5 INJECTION INTRAVENOUS at 08:56

## 2023-03-04 RX ADMIN — HEPARIN SODIUM 5000 UNITS: 5000 INJECTION INTRAVENOUS; SUBCUTANEOUS at 14:17

## 2023-03-04 RX ADMIN — PANTOPRAZOLE SODIUM 40 MG: 40 TABLET, DELAYED RELEASE ORAL at 05:47

## 2023-03-04 RX ADMIN — LEVOTHYROXINE SODIUM 50 MCG: 100 TABLET ORAL at 05:47

## 2023-03-04 RX ADMIN — HEPARIN SODIUM 5000 UNITS: 5000 INJECTION INTRAVENOUS; SUBCUTANEOUS at 05:48

## 2023-03-04 RX ADMIN — ACETAMINOPHEN 650 MG: 325 TABLET ORAL at 20:29

## 2023-03-04 RX ADMIN — CLOPIDOGREL BISULFATE 75 MG: 75 TABLET ORAL at 08:54

## 2023-03-04 RX ADMIN — METOPROLOL TARTRATE 25 MG: 25 TABLET, FILM COATED ORAL at 18:36

## 2023-03-04 RX ADMIN — FINASTERIDE 5 MG: 5 TABLET, FILM COATED ORAL at 08:54

## 2023-03-04 RX ADMIN — CEFEPIME 1000 MG: 1 INJECTION, POWDER, FOR SOLUTION INTRAMUSCULAR; INTRAVENOUS at 18:44

## 2023-03-04 RX ADMIN — LOSARTAN POTASSIUM 50 MG: 25 TABLET, FILM COATED ORAL at 18:36

## 2023-03-04 ASSESSMENT — ENCOUNTER SYMPTOMS
FACIAL SWELLING: 0
ABDOMINAL PAIN: 0
VOMITING: 0
BLOOD IN STOOL: 0
NAUSEA: 0
COUGH: 0
CONSTIPATION: 0
ABDOMINAL DISTENTION: 0
CHEST TIGHTNESS: 0
PHOTOPHOBIA: 0
DIARRHEA: 0
SHORTNESS OF BREATH: 0
EYE DISCHARGE: 0
EYE REDNESS: 0

## 2023-03-04 ASSESSMENT — PAIN SCALES - GENERAL: PAINLEVEL_OUTOF10: 0

## 2023-03-04 NOTE — PROGRESS NOTES
[facility] PROGRESS NOTE    3/4/2023 8:58 AM        Name: Ailyn Bruno              Admitted: 3/2/2023  Primary Care Provider: Chan Webb (Tel: [phone])      Subjective:     Ailyn Bruno is a 74 y.o. male with a past medical history of hypertension, hyperlipidemia,  hypothyroidism, cryptogenic CVA s/p ILR 2017 (removed)s, CAD s/p CABG x 3 2/21/2022 Dr. Napier Favorite,  stents 9/2022 to RCA and stents 1/2023,  ESRD on HD, DM2 who presented from HD with leg cramps and CP. Reportedly had generalized body aches, fever, chills. 2 hours of HD was completed. Initial workup showed low WBC. Admitted for sepsis and started on Vancomycin and cefepime. WBC 25 3/3/2023. Interval History: Today, he is resting in bed comfortably and feeling well. Denies CP, SOB, or swelling. No abd pain, N/V/D. He wants to go home. Independently reviewed interval ancillary notes from  nephrology . Problem List  Principal Problem:    Sepsis ([facility] [address]  Resolved Problems:    * No resolved hospital problems. *     Assessment and Plan:    Sepsis   - Leukocytosis, fever, chills on admission, WBC improving with IV abx  - No clear etiology   - Repeat Lactic 2.6 (1.7 on admission), discussed with nephrology and will not remove any fluid in HD today   - Continue cefepime and vancomycin (pharmacy to dose)   - Blood cultures pending, CXR with atelectasis, source of infection remains undetermined  Hypertension   - BP improved, resume metoprolol  DM2   - Reviewed BG overnight. Continues Lanus 4 units nightly and low-dose SSI  ESRD   - On HD, consult nephrology   Mild AS   - Stable on echo 2/9156   - faint systolic murmur on exam    - Continue IV abx, follow leukocytosis and fever curve  - Resume BP medication as tolerated, restart metoprolol    Discussed care with patient and nursing  All pertinent updates, labs, and plan of care reviewed with Dr. Zandra Yepez: ADULT DIET;  Regular; 5 carb choices (75 gm/meal); Low Fat/Low Chol/High Fiber/WILMA; Low Sodium (2 gm)  Code:Full Code  DVT PPX: SQ heparin    Disposition: Home when medically able, denies needs    Current Medications  aspirin EC tablet 81 mg, Daily  atorvastatin (LIPITOR) tablet 80 mg, Daily  clopidogrel (PLAVIX) tablet 75 mg, Daily  finasteride (PROSCAR) tablet 5 mg, Daily  insulin glargine (LANTUS) injection vial 4 Units, Nightly  levothyroxine (SYNTHROID) tablet 50 mcg, Daily  [Held by provider] losartan (COZAAR) tablet 100 mg, Daily  [Held by provider] metoprolol tartrate (LOPRESSOR) tablet 25 mg, BID  pantoprazole (PROTONIX) tablet 40 mg, QAM AC  cefepime (MAXIPIME) 1,000 mg in sodium chloride 0.9 % 50 mL IVPB (mini-bag), Q24H  sodium chloride flush 0.9 % injection 5-40 mL, 2 times per day  sodium chloride flush 0.9 % injection 5-40 mL, PRN  0.9 % sodium chloride infusion, PRN  ondansetron (ZOFRAN-ODT) disintegrating tablet 4 mg, Q8H PRN   Or  ondansetron (ZOFRAN) injection 4 mg, Q6H PRN  polyethylene glycol (GLYCOLAX) packet 17 g, Daily PRN  acetaminophen (TYLENOL) tablet 650 mg, Q6H PRN   Or  acetaminophen (TYLENOL) suppository 650 mg, Q6H PRN  heparin (porcine) injection 5,000 Units, 3 times per day  dextrose bolus 10% 125 mL, PRN   Or  dextrose bolus 10% 250 mL, PRN  glucagon (rDNA) injection 1 mg, PRN  dextrose 10 % infusion, Continuous PRN  insulin lispro (HUMALOG) injection vial 0-4 Units, TID WC  insulin lispro (HUMALOG) injection vial 0-4 Units, Nightly  vancomycin (VANCOCIN) intermittent dosing (placeholder), RX Placeholder      Objective:  /65   Pulse 57   Temp 98.4 °F (36.9 °C) (Oral)   Resp 16   Ht 5' 9\" (1.753 m)   Wt 178 lb 2 oz (80.8 kg)   SpO2 94%   BMI 26.30 kg/m²   Vitals:    03/04/23 0745   BP: 112/65   Pulse: 57   Resp: 16   Temp: 98.4 °F (36.9 °C)   SpO2: 94%       Intake/Output Summary (Last 24 hours) at 3/4/2023 0884  Last data filed at 3/4/2023 0244  Gross per 24 hour   Intake 480 ml   Output 250 ml   Net 230 ml        Wt Readings from Last 3 Encounters:   03/04/23 178 lb 2 oz (80.8 kg)   06/27/17 212 lb 3.2 oz (96.3 kg)   03/13/17 221 lb (100.2 kg)     Review of Systems:  Constitutional: Negative for fever, weight changes, or weakness  Skin: Negative for bruising, bleeding, blood clots, or changes in skin pigment  HEENT: Negative for vision changes or dysphagia  Respiratory: Denies SOB, cough, recent URI  Cardiovascular:Denies syncope, dizziness or exertional CP  Gastrointestinal: Negative for abdominal pain, diarrhea, constipation, or black/tarry stools + vomiting x 1 in ER  Genito-Urinary: Negative for hematuria  Musculoskeletal: No focal weakness  Neurological/Psych: Negative for confusion or TIA-like symptoms. No anxiety, depression, or insomnia    Physical Examination:  Telemetry: Personally Reviewed Normal sinus rhythm  Constitutional: Cooperative and in no apparent distress, appears well nourished, No obesity  Skin: Warm and pink; no cyanosis, bruising, or clubbing, No lesions/incisions  HEENT: Symmetric and normocephalic. Conjunctiva pink with clear sclera. Mucus membranes pink and moist.   Cardiovascular: regular rate and rhythm. S1 & S2, positive for murmurs. Peripheral pulses 2+, No peripheral edema  Respiratory: Respirations symmetric and unlabored. Lungs clear to auscultation bilaterally, no wheezing, crackles, or rhonchi  Gastrointestinal: Abdomen soft and round. normal bowel sounds. No tenderness  Musculoskeletal: No focal weakness, muscle strength 5/5 bilaterally  Neurologic/Psych: Awake and orientated to person, place and time. Calm affect, appropriate mood.      Pertinent labs, diagnostic, and imaging results reviewed as a part of this visit    Labs and Tests:  CBC:   Recent Labs     03/02/23  1411 03/03/23  0539 03/04/23  0509   WBC 1.5* 25.3* 21.4*   HGB 10.0* 9.3* 9.2*    167 173       BMP:    Recent Labs     03/02/23  1411 03/03/23  0539 03/04/23  0509    141 136   K 3.7 4.4 3.6   CL 99 100 98* CO2 25 30 30   BUN 68* 82* 53*   CREATININE 3.6* 5.1* 4.1*   GLUCOSE 129* 119* 78       Hepatic:   Recent Labs     03/02/23  1411   AST 24   ALT 20   BILITOT 1.6*   ALKPHOS 54       Relevant results:  CXR: 3/2/2023  No radiographic evidence of acute pulmonary disease. CT: 3/2/2023  Dependent airspace disease at the lung bases, likely atelectasis. U/A: none    ECG: 3/2/2023: SR    Prior Studies:  Echocardiogram: 1/12/2023  Study Conclusions     - Left ventricle: The cavity size is normal. Wall thickness was increased in a pattern of mild LVH. Systolic function was normal. Features are consistent with a pseudonormal left ventricular filling     pattern, with concomitant abnormal relaxation and increased filling pressure (grade 2 diastolic     dysfunction). - Aortic valve: Mild thickening. Mild calcification. There is mild stenosis. The valve area by the     velocity-time integral method is 1.4cm^2. The valve area by the peak velocity method is 1.5cm^2. The valve area by the mean velocity method is 1.4cm^2. - Mitral valve: The valve area is 2.3cm^2. The valve area (LVOT continuity) is 2.3cm^2.   - Left atrium: The atrium is dilated. - Right ventricle: Systolic function was normal by visual assessment. LHC:     Left heart Cath(9/22)  IMPRESSIONS:     1. PCI to 90 % stenosis int he ostial and proximal PDA with 2 x 15 mm ANNIKA with excellent      angiographic results. PCI to mid RCA with 2.75 x 26 mm ANNIKA post dilated in the proximal stent with 3 x 12 mm non      compliant balloon taken to 12 atmospheres with excellent angiographic results. PCI to proximal RCA with 3.5 x 12 mm ANNIKA post dilated with a 3.5 x 12 mm non compliant balloon      inflated to 14 atmospheres with excellent angiographic results. 2. LVEDP was 13 mm Hg. 3. Left coronary system and bypass grafts were not engaged.      RECOMMENDATIONS:  ASA 81 mg daily life long   Plavix 75 mg daily for six months, patient loaded with 600 mg   Plavix in the cath lab.    Optimize antianginals and aggressive therapy for coronary artery   disease   Continue high intensity statin   Cardiac Rehab   Femoral Hemostasis with angioseal. \"  JAZMÍN Hartley - CNP   3/4/2023 8:58 AM

## 2023-03-04 NOTE — PROGRESS NOTES
Night shift assessment completed. Routine vitals have been obtained. Scheduled medications given. Patient is awake, alert and oriented/ talking to staff. Respirations are easy and unlabored with no c/o SOB. Skin has been assessed per writer. IV site is C/D/I. Patient does not appear to be in distress. Call light within reach. Standard safety measures are in place. All needs have been met at this time.

## 2023-03-04 NOTE — PROGRESS NOTES
[facility] Note    Patient Active Problem List   Diagnosis    Type II or 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, uncontrolled(250.62)    Diabetic polyneuropathy (HCC)    Diabetes mellitus with background retinopathy ([facility] [address]    Other and unspecified hyperlipidemia    Type II or 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, not stated as uncontrolled(250.60)    Nephritis and nephropathy, not specified as acute or chronic, with other specified pathological lesion in kidney, in diseases classified elsewhere    Sepsis ([facility] [address]       Past Medical History:   has a past medical history of Fainted, High cholesterol, Hypertension, Hypothyroid, Kidney disease, and Type II or unspecified type diabetes mellitus without mention of complication, not stated as uncontrolled. Past Social History:   reports that he has never smoked. He has never used smokeless tobacco. He reports that he does not currently use alcohol. He reports that he does not use drugs. Subjective:  No complaints. Wants to go home. Review of Systems   Constitutional:  Negative for activity change, appetite change, chills, fatigue, fever and unexpected weight change. HENT:  Negative for congestion and facial swelling. Eyes:  Negative for photophobia, discharge and redness. Respiratory:  Negative for cough, chest tightness and shortness of breath. Cardiovascular:  Negative for chest pain, palpitations and leg swelling. Gastrointestinal:  Negative for abdominal distention, abdominal pain, blood in stool, constipation, diarrhea, nausea and vomiting. Endocrine: Negative for cold intolerance, heat intolerance and polyuria. Genitourinary:  Negative for decreased urine volume, difficulty urinating, flank pain and hematuria. Musculoskeletal:  Negative for joint swelling and neck pain.    Neurological:  Negative for dizziness, seizures, syncope, speech difficulty, light-headedness and headaches. Hematological:  Does not bruise/bleed easily. Psychiatric/Behavioral:  Negative for agitation, confusion and hallucinations. Objective:      /68   Pulse 59   Temp 97.9 °F (36.6 °C) (Oral)   Resp 16   Ht 5' 9\" (1.753 m)   Wt 178 lb 2 oz (80.8 kg)   SpO2 94%   BMI 26.30 kg/m²     Wt Readings from Last 3 Encounters:   03/04/23 178 lb 2 oz (80.8 kg)   06/27/17 212 lb 3.2 oz (96.3 kg)   03/13/17 221 lb (100.2 kg)       BP Readings from Last 3 Encounters:   03/04/23 134/68   06/27/17 128/82   03/13/17 130/82     Chest- clear  Heart-regular  Abd-soft  Ext- no edema    Labs  Hemoglobin   Date Value Ref Range Status   03/04/2023 9.2 (L) 13.5 - 17.5 g/dL Final     Hematocrit   Date Value Ref Range Status   03/04/2023 28.0 (L) 40.5 - 52.5 % Final     WBC   Date Value Ref Range Status   03/04/2023 21.4 (H) 4.0 - 11.0 K/uL Final     Platelets   Date Value Ref Range Status   03/04/2023 173 135 - 450 K/uL Final     Lab Results   Component Value Date    CREATININE 4.1 (H) 03/04/2023    BUN 53 (H) 03/04/2023     03/04/2023    K 3.6 03/04/2023    CL 98 (L) 03/04/2023    CO2 30 03/04/2023        1. ESRD on home-HD training-: [facility].   -Was supposed to start doing HD at home this week. with his wife. - outpt HD center: [facility] , Dr. Adan Mcbride   - Access: Rt arm AVF : (+) T/B     - HD done yesterday. No need for HD today. Would need HD Monday, will discuss with home therapies. 2. Hypertension/Volume Status:  - BP on the lower side, hold BP meds,-off amlodipine, losartan, beta-blocker. BP now better. - Na controlled  - EDW obtain records kg :    -Avoided fluid removal today due to concerns for sepsis, leukocytosis, elevated lactate  - Follow-up blood culture  - CT scan of chest, no major infection  - IV antibiotics per primary team,               -- IV antibiotic: Vancomycin: trough goal <15, pharmacy team assisting. 3. Electrolytes/acid-base:  K:  controlled   High AG metabolic acidosis: controlled   Lactic acidosis mild, with sepsis, follow-up with dialysis,     4. Anemia of renal failure: close goal  - Iron: Avoid with concern for sepsis  - RAEANN: with next HD. 5. BMD:  - Phos WNL  - follow iPTH outpt     Leukocytosis-per Medicine. On Cefepime and Vanco, follow level.      Has AVF for access  UA trace LE and only 5 wbc   CT Chest-no signs of infection  Blood and urine cultures neg so far     Other Problems:  Hospital Problems               Last Modified POA     * (Principal) Sepsis ([facility] [address] 3/2/2023 Yes        Rocky Mcnally MD

## 2023-03-05 VITALS
HEIGHT: 69 IN | OXYGEN SATURATION: 98 % | DIASTOLIC BLOOD PRESSURE: 59 MMHG | TEMPERATURE: 98.2 F | BODY MASS INDEX: 26.55 KG/M2 | HEART RATE: 52 BPM | RESPIRATION RATE: 17 BRPM | SYSTOLIC BLOOD PRESSURE: 118 MMHG | WEIGHT: 179.25 LBS

## 2023-03-05 LAB
ALBUMIN SERPL-MCNC: 3.3 G/DL (ref 3.4–5)
ANION GAP SERPL CALCULATED.3IONS-SCNC: 10 MMOL/L (ref 3–16)
BUN BLDV-MCNC: 72 MG/DL (ref 7–20)
CALCIUM SERPL-MCNC: 8.1 MG/DL (ref 8.3–10.6)
CHLORIDE BLD-SCNC: 99 MMOL/L (ref 99–110)
CO2: 28 MMOL/L (ref 21–32)
CREAT SERPL-MCNC: 5.1 MG/DL (ref 0.8–1.3)
GFR SERPL CREATININE-BSD FRML MDRD: 11 ML/MIN/{1.73_M2}
GLUCOSE BLD-MCNC: 76 MG/DL (ref 70–99)
GLUCOSE BLD-MCNC: 81 MG/DL (ref 70–99)
GLUCOSE BLD-MCNC: 91 MG/DL (ref 70–99)
HCT VFR BLD CALC: 26 % (ref 40.5–52.5)
HEMOGLOBIN: 8.9 G/DL (ref 13.5–17.5)
LACTIC ACID: 0.7 MMOL/L (ref 0.4–2)
MCH RBC QN AUTO: 30.7 PG (ref 26–34)
MCHC RBC AUTO-ENTMCNC: 34.3 G/DL (ref 31–36)
MCV RBC AUTO: 89.4 FL (ref 80–100)
PDW BLD-RTO: 14.2 % (ref 12.4–15.4)
PERFORMED ON: NORMAL
PERFORMED ON: NORMAL
PHOSPHORUS: 4.9 MG/DL (ref 2.5–4.9)
PLATELET # BLD: 192 K/UL (ref 135–450)
PMV BLD AUTO: 7.7 FL (ref 5–10.5)
POTASSIUM SERPL-SCNC: 4.2 MMOL/L (ref 3.5–5.1)
RBC # BLD: 2.91 M/UL (ref 4.2–5.9)
SODIUM BLD-SCNC: 137 MMOL/L (ref 136–145)
URINE CULTURE, ROUTINE: NORMAL
WBC # BLD: 14.2 K/UL (ref 4–11)

## 2023-03-05 PROCEDURE — 83605 ASSAY OF LACTIC ACID: CPT

## 2023-03-05 PROCEDURE — 6370000000 HC RX 637 (ALT 250 FOR IP): Performed by: NURSE PRACTITIONER

## 2023-03-05 PROCEDURE — 85027 COMPLETE CBC AUTOMATED: CPT

## 2023-03-05 PROCEDURE — 6370000000 HC RX 637 (ALT 250 FOR IP): Performed by: HOSPITALIST

## 2023-03-05 PROCEDURE — 2580000003 HC RX 258: Performed by: HOSPITALIST

## 2023-03-05 PROCEDURE — 36415 COLL VENOUS BLD VENIPUNCTURE: CPT

## 2023-03-05 PROCEDURE — 6360000002 HC RX W HCPCS: Performed by: HOSPITALIST

## 2023-03-05 PROCEDURE — 80069 RENAL FUNCTION PANEL: CPT

## 2023-03-05 RX ORDER — LOSARTAN POTASSIUM 100 MG/1
100 TABLET ORAL DAILY
Status: DISCONTINUED | OUTPATIENT
Start: 2023-03-05 | End: 2023-03-05 | Stop reason: HOSPADM

## 2023-03-05 RX ORDER — AMLODIPINE BESYLATE 5 MG/1
10 TABLET ORAL DAILY
Status: DISCONTINUED | OUTPATIENT
Start: 2023-03-05 | End: 2023-03-05 | Stop reason: HOSPADM

## 2023-03-05 RX ORDER — AMOXICILLIN AND CLAVULANATE POTASSIUM 500; 125 MG/1; MG/1
1 TABLET, FILM COATED ORAL 2 TIMES DAILY
Qty: 14 TABLET | Refills: 0 | Status: SHIPPED | OUTPATIENT
Start: 2023-03-05 | End: 2023-03-12

## 2023-03-05 RX ADMIN — AMLODIPINE BESYLATE 10 MG: 5 TABLET ORAL at 10:56

## 2023-03-05 RX ADMIN — HEPARIN SODIUM 5000 UNITS: 5000 INJECTION INTRAVENOUS; SUBCUTANEOUS at 05:27

## 2023-03-05 RX ADMIN — PANTOPRAZOLE SODIUM 40 MG: 40 TABLET, DELAYED RELEASE ORAL at 05:28

## 2023-03-05 RX ADMIN — METOPROLOL TARTRATE 25 MG: 25 TABLET, FILM COATED ORAL at 08:44

## 2023-03-05 RX ADMIN — LOSARTAN POTASSIUM 100 MG: 100 TABLET, FILM COATED ORAL at 08:44

## 2023-03-05 RX ADMIN — ASPIRIN 81 MG: 81 TABLET, COATED ORAL at 08:44

## 2023-03-05 RX ADMIN — FINASTERIDE 5 MG: 5 TABLET, FILM COATED ORAL at 08:44

## 2023-03-05 RX ADMIN — SODIUM CHLORIDE, PRESERVATIVE FREE 10 ML: 5 INJECTION INTRAVENOUS at 08:45

## 2023-03-05 RX ADMIN — CLOPIDOGREL BISULFATE 75 MG: 75 TABLET ORAL at 08:44

## 2023-03-05 RX ADMIN — LEVOTHYROXINE SODIUM 50 MCG: 100 TABLET ORAL at 05:28

## 2023-03-05 RX ADMIN — ATORVASTATIN CALCIUM 80 MG: 80 TABLET, FILM COATED ORAL at 08:44

## 2023-03-05 ASSESSMENT — ENCOUNTER SYMPTOMS
EYE DISCHARGE: 0
SHORTNESS OF BREATH: 0
ABDOMINAL PAIN: 0
BLOOD IN STOOL: 0
VOMITING: 0
ABDOMINAL DISTENTION: 0
COUGH: 0
EYE REDNESS: 0
NAUSEA: 0
CONSTIPATION: 0
FACIAL SWELLING: 0
DIARRHEA: 0
CHEST TIGHTNESS: 0
PHOTOPHOBIA: 0

## 2023-03-05 ASSESSMENT — PAIN SCALES - GENERAL: PAINLEVEL_OUTOF10: 0

## 2023-03-05 NOTE — PLAN OF CARE
Problem: Safety - Adult  Goal: Free from fall injury  3/5/2023 1014 by Gauri Waite RN  Outcome: Adequate for Discharge  3/4/2023 2317 by Ramón Umanzor RN  Outcome: Progressing     Problem: Pain  Goal: Verbalizes/displays adequate comfort level or baseline comfort level  3/5/2023 1014 by Gauri Waite RN  Outcome: Adequate for Discharge  3/4/2023 2317 by Ramón Umanzor RN  Outcome: Progressing     Problem: Chronic Conditions and Co-morbidities  Goal: Patient's chronic conditions and co-morbidity symptoms are monitored and maintained or improved  3/5/2023 1014 by Gauri Waite RN  Outcome: Adequate for Discharge  3/4/2023 2317 by Ramón Umanzor RN  Outcome: Progressing

## 2023-03-05 NOTE — PROGRESS NOTES
AVS reviewed with pt., all questions answered. PIV removed, sheath intact, no bleeding noted and DSD applied. All pt. Belongings sent with pt. Pt. Declined w/c transport to exit facility. Ambulated independently w/spouse from this unit, escorted by this RN to main exit.

## 2023-03-05 NOTE — PROGRESS NOTES
Pt. A&O x4, VSS on RA. Denies pain. Tolerating PO diet, good appetite. Medications administered per MAR, no complication noted. Denies any needs at this time. Bed in lowest position, brakes locked, SR x2 in place and call light is within reach.

## 2023-03-05 NOTE — PROGRESS NOTES
Pt. A&O x4, VSS on RA. Denies pain. Tolerating PO diet, good appetite. Medications administered per MAR, no complication noted. Per Medicine, re-check B/P 1-2 hours after scheduled am B/P med administration, likely d/c today. Denies any needs at this time. Bed in lowest position, brakes locked, SR x2 in place and call light is within reach.

## 2023-03-05 NOTE — DISCHARGE SUMMARY
[facility]    Patient Demographics    Patient. Amrit Owens  Date of Birth. 1950  MRN. [id number]     Primary care provider. Cedrick Givens  (Tel: [phone])    Admit date: 3/2/2023    Discharge date (blank if same as Note Date): Note Date: 3/5/2023     Reason for Hospitalization. Chief Complaint   Patient presents with    Chest Pain     From dialysis by irvin. Started cp 20 min pta and decrease when sits up. C/o cramping in legs       Significant Findings. Principal Problem:    Sepsis ([address]  Resolved Problems:    * No resolved hospital problems. [facility] Course. Amrit Owens is a 74 y.o. male with a past medical history of hypertension, hyperlipidemia,  hypothyroidism, cryptogenic CVA s/p ILR 2017 (removed)s, CAD s/p CABG x 3 2/21/2022 Dr. Jazmine Jackson,  stents 9/2022 to RCA and stents 1/2023,  ESRD on HD, DM2 who presented from HD with leg cramps and CP. Reportedly had generalized body aches, fever, chills. 2 hours of HD was completed. Initial workup showed low WBC. Admitted for sepsis and started on Vancomycin and cefepime. WBC 25-->21-->14. He was transitioned to oral Augmentin per neprho recs and discharged home. Blood cultures and urine culture negative to date. Assessment and Plan:  Sepsis              - Leukocytosis, fever, chills on admission, WBC improving with IV abx  - No clear etiology, blood and urine cx negative today date              - Repeat Lactic 2.6-->0.7 (1.7 on admission), discussed with nephrology and will not remove any fluid in HD today              - Transition to oral Augmentin per nephro recs  Hypertension              - BP improved, home meds all resumed and tolerated  DM2              - Reviewed BG overnight.  Resume home medications  ESRD              - On HD, nephrology to arrange OP HD   Mild AS              - Stable on echo 1/2023              - faint systolic murmur on exam, he sees cardiologist on Wed and wants to keep appt as it is to establish care, recent workup reviewed    Discussed with Dr. Cm Nathan regarding abx dosing, recommended Augmentin 500/125 mg bid for 7 days    Discussed with Dr. Kendall Lugo, agrees with above plan    Discharge recommendations given to patient. Follow Up. PCP in 1 week, nephrology 1 week, cards 1 week as scheduled  Disposition. home  Activity. activity as tolerated  Diet: ADULT DIET; Regular; 5 carb choices (75 gm/meal); Low Fat/Low Chol/High Fiber/WILMA; Low Sodium (2 gm)      Problems and results from this hospitalization that need follow up. Leukocytosis, CBC in 1 week, oral abx, PCP follow up    Significant test results and incidental findings. CT CHEST WO CONTRAST   Final Result   Dependent airspace disease at the lung bases, likely atelectasis. XR CHEST PORTABLE   Final Result   No radiographic evidence of acute pulmonary disease. Invasive procedures and treatments. None     Consults. IP CONSULT TO PHARMACY  IP CONSULT TO NEPHROLOGY    Physical examination on discharge day. BP (!) 144/71   Pulse 55   Temp 97.7 °F (36.5 °C) (Oral)   Resp 17   Ht 5' 9\" (1.753 m)   Wt 179 lb 4 oz (81.3 kg)   SpO2 96%   BMI 26.47 kg/m²   General appearance. Alert. Looks comfortable. Well nourished. HEENT. Sclera clear. Moist mucus membranes. Cardiovascular. Regular rate and rhythm, normal S1, S2. No murmur. No significant peripheral edema  Respiratory. Breathing unlabored, not using accessory muscles. Clear to auscultation bilaterally, no wheeze, crackles or rhonchi. Gastrointestinal. Abdomen soft, non-tender, not distended, normal bowel sounds. Neurology. Facial symmetry. No speech deficits. Moving all extremities equally. Extremities. No focal weakness. Pulses palpable. Skin. Warm, dry, normal turgor    Condition at time of discharge: Good    Medication instructions provided to patient at discharge.      Medication List        START taking these medications      amoxicillin-clavulanate 500-125 MG per tablet  Commonly known as: Augmentin  Take 1 tablet by mouth in the morning and at bedtime for 7 days            CHANGE how you take these medications      atorvastatin 80 MG tablet  Commonly known as: LIPITOR  What changed: Another medication with the same name was removed. Continue taking this medication, and follow the directions you see here. CONTINUE taking these medications      Accu-Chek FastClix Lancets Misc  1 each by Does not apply route 5 times daily     amLODIPine 10 MG tablet  Commonly known as: NORVASC     aspirin EC 81 MG EC tablet     blood glucose test strips strip  Commonly known as: Accu-Chek SmartView  1 each by In Vitro route 5 times daily As needed.      clopidogrel 75 MG tablet  Commonly known as: PLAVIX     ergocalciferol 1.25 MG (07451 UT) capsule  Commonly known as: Drisdol  Take 1 capsule by mouth once a week     finasteride 5 MG tablet  Commonly known as: PROSCAR     insulin glargine 100 UNIT/ML injection vial  Commonly known as: LANTUS     Insulin Pen Needle 31G X 6 MM Misc  Commonly known as: [employer] Pen Needles  1 each by Does not apply route 5 times daily     levothyroxine 50 MCG tablet  Commonly known as: SYNTHROID     lidocaine-prilocaine 2.5-2.5 % cream  Commonly known as: EMLA     losartan 100 MG tablet  Commonly known as: COZAAR     metoprolol tartrate 25 MG tablet  Commonly known as: LOPRESSOR     MULTIVITAMIN PO     pantoprazole 40 MG tablet  Commonly known as: PROTONIX     polyethylene glycol 17 GM/SCOOP powder  Commonly known as: GLYCOLAX     sevelamer hcl 800 MG tablet  Commonly known as: RENAGEL     STRESS FORMULA PO     Trulicity 4.5 QE/5.0FM Sopn  Generic drug: Dulaglutide     Vascepa 1 g Caps capsule  Generic drug: Icosapent Ethyl     Vitamin D3 125 MCG (5000 UT) Tabs            STOP taking these medications      cloNIDine 0.2 MG tablet  Commonly known as: CATAPRES     EYE HEALTH FORMULA PO     fish oil 1000 MG capsule     folic acid-pyridoxine-cyancobalamin 2.5-25-2 mg tablet 2.5-25-2 MG Tabs  Commonly known as: Folbic     furosemide 20 MG tablet  Commonly known as: LASIX     insulin lispro 100 UNIT/ML pen  Commonly known as: HumaLOG KwikPen     Liraglutide 18 MG/3ML Sopn SC injection  Commonly known as: VICTOZA     nebivolol 20 MG Tabs tablet  Commonly known as: BYSTOLIC               Where to Get Your Medications        These medications were sent to [facility] [id number] [facility] [place], [address] [place] [id number] - F [phone]  [address] [facility], [address]      Phone: [phone]   amoxicillin-clavulanate 500-125 MG per tablet       Spent 35 minutes in discharge process.     Signed:  JAZMÍN Cerda CNP     3/5/2023 11:46 AM

## 2023-03-05 NOTE — PROGRESS NOTES
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)    Diabetic polyneuropathy (HCC)    Diabetes mellitus with background retinopathy ([facility] [address]    Other and unspecified hyperlipidemia    Type II or 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, not stated as uncontrolled(250.60)    Nephritis and nephropathy, not specified as acute or chronic, with other specified pathological lesion in kidney, in diseases classified elsewhere    Sepsis ([facility] [address]       Past Medical History:   has a past medical history of Fainted, High cholesterol, Hypertension, Hypothyroid, Kidney disease, and Type II or unspecified type diabetes mellitus without mention of complication, not stated as uncontrolled. Past Social History:   reports that he has never smoked. He has never used smokeless tobacco. He reports that he does not currently use alcohol. He reports that he does not use drugs. Subjective:  No complaints. Wants to go home. Review of Systems   Constitutional:  Negative for activity change, appetite change, chills, fatigue, fever and unexpected weight change. HENT:  Negative for congestion and facial swelling. Eyes:  Negative for photophobia, discharge and redness. Respiratory:  Negative for cough, chest tightness and shortness of breath. Cardiovascular:  Negative for chest pain, palpitations and leg swelling. Gastrointestinal:  Negative for abdominal distention, abdominal pain, blood in stool, constipation, diarrhea, nausea and vomiting. Endocrine: Negative for cold intolerance, heat intolerance and polyuria. Genitourinary:  Negative for decreased urine volume, difficulty urinating, flank pain and hematuria. Musculoskeletal:  Negative for joint swelling and neck pain.    Neurological:  Negative for dizziness, seizures, syncope, speech difficulty, light-headedness and headaches. Hematological:  Does not bruise/bleed easily. Psychiatric/Behavioral:  Negative for agitation, confusion and hallucinations. Objective:      BP (!) 148/77   Pulse 54   Temp 97.7 °F (36.5 °C) (Oral)   Resp 17   Ht 5' 9\" (1.753 m)   Wt 179 lb 4 oz (81.3 kg)   SpO2 96%   BMI 26.47 kg/m²     Wt Readings from Last 3 Encounters:   03/05/23 179 lb 4 oz (81.3 kg)   06/27/17 212 lb 3.2 oz (96.3 kg)   03/13/17 221 lb (100.2 kg)       BP Readings from Last 3 Encounters:   03/05/23 (!) 148/77   06/27/17 128/82   03/13/17 130/82     Chest- clear  Heart-regular  Abd-soft  Ext- no edema    Labs  Hemoglobin   Date Value Ref Range Status   03/05/2023 8.9 (L) 13.5 - 17.5 g/dL Final     Hematocrit   Date Value Ref Range Status   03/05/2023 26.0 (L) 40.5 - 52.5 % Final     WBC   Date Value Ref Range Status   03/05/2023 14.2 (H) 4.0 - 11.0 K/uL Final     Platelets   Date Value Ref Range Status   03/05/2023 192 135 - 450 K/uL Final     Lab Results   Component Value Date    CREATININE 5.1 (HH) 03/05/2023    BUN 72 (H) 03/05/2023     03/05/2023    K 4.2 03/05/2023    CL 99 03/05/2023    CO2 28 03/05/2023        1. ESRD on home-HD training-: [facility].   -Was supposed to start doing HD at home this week. with his wife. - outpt HD center: [facility] , Dr. Trung Georges   - Access: Rt arm AVF : (+) T/B     - HD done Friday. No need for HD today. Would need HD Monday, will discuss with home therapies. Undergoing training for HHD. 2. Hypertension/Volume Status:  - BP was on the lower side, hold BP meds,-off amlodipine, losartan, beta-blocker. BP now better. - Na controlled        3. Electrolytes/acid-base:  K:  controlled   High AG metabolic acidosis: controlled   Lactic acidosis mild, with sepsis, follow-up with dialysis,     4. Anemia of renal failure: close goal  - Iron: Avoid with concern for sepsis  - RAEANN: with next HD.       5. BMD:  - Phos WNL  - follow iP[facility] outpt     Leukocytosis-? Source. Per Medicine. On Cefepime and Vanco, follow level. Now better. Discussed with nurse to call me with outpt. Abx regimen. Would be easier to use po meds if possible with current HHD training. Has AVF for access  UA trace LE and only 5 wbc   CT Chest-no signs of infection  Blood and urine cultures neg so far    Okay for discharge from renal perspective once okay with Medicine.       Other Problems:  Hospital Problems               Last Modified POA     * (Principal) Sepsis ([facility] [address] 3/2/2023 Yes        Franchesca Coffey MD

## 2023-03-06 LAB
BLOOD CULTURE, ROUTINE: NORMAL
CULTURE, BLOOD 2: NORMAL

## 2023-03-15 NOTE — PROGRESS NOTES
Physician Progress Note      Krystal Méndez  [facility] #:                  [id number]  :                       1950  ADMIT DATE:       3/2/2023 1:35 PM  [address] [place] DATE:        3/5/2023 1:36 PM  RESPONDING  PROVIDER #:        Catalina Barth CNP          QUERY TEXT:    Patient admitted with chest pain, body aches, chills and nausea. Noted   documentation of sepsis in 3/2 H&P. If possible, please document in progress   notes and discharge summary the source of sepsis:    The medical record reflects the following:  Risk Factors: 74year old male with ESRD with fever and low WBC  Clinical Indicators: WBC- 1.5  25.3  21.4  14.2. LA- 2.6. Temp 100.7. Per H&P-   Sepsis, Fever  -Meet SIRS criteria-  -Given dialysis. Low-grade fever and low WBC. Per DCS- Sepsis  - Leukocytosis, fever, chills on admission, WBC improving with IV abx  - No clear etiology, blood and urine cx negative today date  Treatment: Labs, Imaging, IV Vanco and cefepime, DC on PO Augmentin, Nephro   consult  Options provided:  -- Sepsis due to, Please document source. -- Sepsis of unknown source  -- Other - I will add my own diagnosis  -- Disagree - Not applicable / Not valid  -- Disagree - Clinically unable to determine / Unknown  -- Refer to Clinical Documentation Reviewer    PROVIDER RESPONSE TEXT:    This patient had sepsis of unknown source.     Query created by: Quirino Khan on 3/14/2023 8:23 AM      Electronically signed by:  Catalina Barth CNP 3/15/2023 9:47 AM

## 2023-08-25 ENCOUNTER — HOSPITAL ENCOUNTER (INPATIENT)
Age: 73
LOS: 4 days | Discharge: NURSING, REHAB OR LONG-TERM CARE | DRG: 091 | End: 2023-08-29
Attending: EMERGENCY MEDICINE | Admitting: STUDENT IN AN ORGANIZED HEALTH CARE EDUCATION/TRAINING PROGRAM
Payer: MEDICARE

## 2023-08-25 ENCOUNTER — APPOINTMENT (OUTPATIENT)
Dept: GENERAL RADIOLOGY | Age: 73
DRG: 091 | End: 2023-08-25
Payer: MEDICARE

## 2023-08-25 DIAGNOSIS — Z87.81 HISTORY OF FEMUR FRACTURE: Primary | ICD-10-CM

## 2023-08-25 DIAGNOSIS — N18.6 ESRD (END STAGE RENAL DISEASE) (HCC): ICD-10-CM

## 2023-08-25 LAB
ANION GAP SERPL CALCULATED.3IONS-SCNC: 15 MMOL/L (ref 3–16)
BUN SERPL-MCNC: 63 MG/DL (ref 7–20)
CALCIUM SERPL-MCNC: 8.8 MG/DL (ref 8.3–10.6)
CHLORIDE SERPL-SCNC: 105 MMOL/L (ref 99–110)
CO2 SERPL-SCNC: 23 MMOL/L (ref 21–32)
CREAT SERPL-MCNC: 4.9 MG/DL (ref 0.8–1.3)
GFR SERPLBLD CREATININE-BSD FMLA CKD-EPI: 12 ML/MIN/{1.73_M2}
GLUCOSE SERPL-MCNC: 119 MG/DL (ref 70–99)
POTASSIUM SERPL-SCNC: 5.2 MMOL/L (ref 3.5–5.1)
SODIUM SERPL-SCNC: 143 MMOL/L (ref 136–145)

## 2023-08-25 PROCEDURE — 73560 X-RAY EXAM OF KNEE 1 OR 2: CPT

## 2023-08-25 PROCEDURE — 80048 BASIC METABOLIC PNL TOTAL CA: CPT

## 2023-08-25 PROCEDURE — 36415 COLL VENOUS BLD VENIPUNCTURE: CPT

## 2023-08-25 PROCEDURE — 1200000000 HC SEMI PRIVATE

## 2023-08-25 PROCEDURE — 99285 EMERGENCY DEPT VISIT HI MDM: CPT

## 2023-08-25 PROCEDURE — 73552 X-RAY EXAM OF FEMUR 2/>: CPT

## 2023-08-25 RX ORDER — INSULIN LISPRO 100 [IU]/ML
0-4 INJECTION, SOLUTION INTRAVENOUS; SUBCUTANEOUS NIGHTLY
Status: DISCONTINUED | OUTPATIENT
Start: 2023-08-25 | End: 2023-08-29 | Stop reason: HOSPADM

## 2023-08-25 RX ORDER — INSULIN LISPRO 100 [IU]/ML
0-8 INJECTION, SOLUTION INTRAVENOUS; SUBCUTANEOUS
Status: DISCONTINUED | OUTPATIENT
Start: 2023-08-26 | End: 2023-08-29 | Stop reason: HOSPADM

## 2023-08-25 ASSESSMENT — LIFESTYLE VARIABLES
HOW MANY STANDARD DRINKS CONTAINING ALCOHOL DO YOU HAVE ON A TYPICAL DAY: PATIENT DOES NOT DRINK
HOW OFTEN DO YOU HAVE A DRINK CONTAINING ALCOHOL: NEVER

## 2023-08-25 ASSESSMENT — PAIN DESCRIPTION - FREQUENCY: FREQUENCY: INTERMITTENT

## 2023-08-25 ASSESSMENT — PAIN DESCRIPTION - DESCRIPTORS: DESCRIPTORS: ACHING

## 2023-08-25 ASSESSMENT — PAIN SCALES - GENERAL: PAINLEVEL_OUTOF10: 2

## 2023-08-25 ASSESSMENT — PAIN DESCRIPTION - ORIENTATION: ORIENTATION: RIGHT

## 2023-08-25 ASSESSMENT — PAIN DESCRIPTION - PAIN TYPE: TYPE: CHRONIC PAIN

## 2023-08-25 ASSESSMENT — PAIN DESCRIPTION - LOCATION: LOCATION: KNEE

## 2023-08-26 PROBLEM — E87.8 ELECTROLYTE IMBALANCE: Status: ACTIVE | Noted: 2023-08-26

## 2023-08-26 PROBLEM — I10 HYPERTENSION: Status: ACTIVE | Noted: 2023-08-26

## 2023-08-26 PROBLEM — D63.1 ANEMIA IN ESRD (END-STAGE RENAL DISEASE) (HCC): Status: ACTIVE | Noted: 2023-08-25

## 2023-08-26 LAB
ALBUMIN SERPL-MCNC: 3.5 G/DL (ref 3.4–5)
ALBUMIN/GLOB SERPL: 1.3 {RATIO} (ref 1.1–2.2)
ALP SERPL-CCNC: 92 U/L (ref 40–129)
ALT SERPL-CCNC: 8 U/L (ref 10–40)
ANION GAP SERPL CALCULATED.3IONS-SCNC: 10 MMOL/L (ref 3–16)
AST SERPL-CCNC: 12 U/L (ref 15–37)
BASOPHILS # BLD: 0.1 K/UL (ref 0–0.2)
BASOPHILS NFR BLD: 1.4 %
BILIRUB SERPL-MCNC: 0.5 MG/DL (ref 0–1)
BUN SERPL-MCNC: 44 MG/DL (ref 7–20)
CALCIUM SERPL-MCNC: 8.7 MG/DL (ref 8.3–10.6)
CHLORIDE SERPL-SCNC: 105 MMOL/L (ref 99–110)
CO2 SERPL-SCNC: 25 MMOL/L (ref 21–32)
CREAT SERPL-MCNC: 3.5 MG/DL (ref 0.8–1.3)
DEPRECATED RDW RBC AUTO: 18.2 % (ref 12.4–15.4)
EOSINOPHIL # BLD: 0.3 K/UL (ref 0–0.6)
EOSINOPHIL NFR BLD: 3.3 %
GFR SERPLBLD CREATININE-BSD FMLA CKD-EPI: 17 ML/MIN/{1.73_M2}
GLUCOSE BLD-MCNC: 106 MG/DL (ref 70–99)
GLUCOSE BLD-MCNC: 112 MG/DL (ref 70–99)
GLUCOSE BLD-MCNC: 120 MG/DL (ref 70–99)
GLUCOSE BLD-MCNC: 173 MG/DL (ref 70–99)
GLUCOSE SERPL-MCNC: 152 MG/DL (ref 70–99)
HCT VFR BLD AUTO: 25.1 % (ref 40.5–52.5)
HGB BLD-MCNC: 8.4 G/DL (ref 13.5–17.5)
LYMPHOCYTES # BLD: 1.6 K/UL (ref 1–5.1)
LYMPHOCYTES NFR BLD: 19.7 %
MAGNESIUM SERPL-MCNC: 2.2 MG/DL (ref 1.8–2.4)
MCH RBC QN AUTO: 28.3 PG (ref 26–34)
MCHC RBC AUTO-ENTMCNC: 33.4 G/DL (ref 31–36)
MCV RBC AUTO: 84.9 FL (ref 80–100)
MONOCYTES # BLD: 0.5 K/UL (ref 0–1.3)
MONOCYTES NFR BLD: 5.9 %
NEUTROPHILS # BLD: 5.8 K/UL (ref 1.7–7.7)
NEUTROPHILS NFR BLD: 69.7 %
PERFORMED ON: ABNORMAL
PHOSPHATE SERPL-MCNC: 3.5 MG/DL (ref 2.5–4.9)
PLATELET # BLD AUTO: 402 K/UL (ref 135–450)
PMV BLD AUTO: 6.2 FL (ref 5–10.5)
POTASSIUM SERPL-SCNC: 4.2 MMOL/L (ref 3.5–5.1)
PROT SERPL-MCNC: 6.2 G/DL (ref 6.4–8.2)
RBC # BLD AUTO: 2.95 M/UL (ref 4.2–5.9)
SODIUM SERPL-SCNC: 140 MMOL/L (ref 136–145)
WBC # BLD AUTO: 8.3 K/UL (ref 4–11)

## 2023-08-26 PROCEDURE — 85025 COMPLETE CBC W/AUTO DIFF WBC: CPT

## 2023-08-26 PROCEDURE — 90935 HEMODIALYSIS ONE EVALUATION: CPT

## 2023-08-26 PROCEDURE — 1200000000 HC SEMI PRIVATE

## 2023-08-26 PROCEDURE — 6370000000 HC RX 637 (ALT 250 FOR IP): Performed by: NURSE PRACTITIONER

## 2023-08-26 PROCEDURE — 2580000003 HC RX 258: Performed by: STUDENT IN AN ORGANIZED HEALTH CARE EDUCATION/TRAINING PROGRAM

## 2023-08-26 PROCEDURE — 99223 1ST HOSP IP/OBS HIGH 75: CPT | Performed by: INTERNAL MEDICINE

## 2023-08-26 PROCEDURE — 90935 HEMODIALYSIS ONE EVALUATION: CPT | Performed by: INTERNAL MEDICINE

## 2023-08-26 PROCEDURE — 84100 ASSAY OF PHOSPHORUS: CPT

## 2023-08-26 PROCEDURE — 6360000002 HC RX W HCPCS: Performed by: STUDENT IN AN ORGANIZED HEALTH CARE EDUCATION/TRAINING PROGRAM

## 2023-08-26 PROCEDURE — 36415 COLL VENOUS BLD VENIPUNCTURE: CPT

## 2023-08-26 PROCEDURE — 5A1D70Z PERFORMANCE OF URINARY FILTRATION, INTERMITTENT, LESS THAN 6 HOURS PER DAY: ICD-10-PCS | Performed by: INTERNAL MEDICINE

## 2023-08-26 PROCEDURE — 6370000000 HC RX 637 (ALT 250 FOR IP): Performed by: STUDENT IN AN ORGANIZED HEALTH CARE EDUCATION/TRAINING PROGRAM

## 2023-08-26 PROCEDURE — 80053 COMPREHEN METABOLIC PANEL: CPT

## 2023-08-26 PROCEDURE — 83735 ASSAY OF MAGNESIUM: CPT

## 2023-08-26 RX ORDER — AMLODIPINE BESYLATE 2.5 MG/1
2.5 TABLET ORAL DAILY
COMMUNITY

## 2023-08-26 RX ORDER — HEPARIN SODIUM 5000 [USP'U]/ML
5000 INJECTION, SOLUTION INTRAVENOUS; SUBCUTANEOUS EVERY 8 HOURS SCHEDULED
Status: DISCONTINUED | OUTPATIENT
Start: 2023-08-26 | End: 2023-08-29 | Stop reason: HOSPADM

## 2023-08-26 RX ORDER — ONDANSETRON 2 MG/ML
4 INJECTION INTRAMUSCULAR; INTRAVENOUS EVERY 6 HOURS PRN
Status: DISCONTINUED | OUTPATIENT
Start: 2023-08-26 | End: 2023-08-29 | Stop reason: HOSPADM

## 2023-08-26 RX ORDER — SODIUM CHLORIDE 0.9 % (FLUSH) 0.9 %
5-40 SYRINGE (ML) INJECTION PRN
Status: DISCONTINUED | OUTPATIENT
Start: 2023-08-26 | End: 2023-08-29 | Stop reason: HOSPADM

## 2023-08-26 RX ORDER — ASPIRIN 81 MG/1
81 TABLET ORAL DAILY
Status: DISCONTINUED | OUTPATIENT
Start: 2023-08-26 | End: 2023-08-29 | Stop reason: HOSPADM

## 2023-08-26 RX ORDER — AMLODIPINE BESYLATE 2.5 MG/1
2.5 TABLET ORAL DAILY
Status: DISCONTINUED | OUTPATIENT
Start: 2023-08-26 | End: 2023-08-27

## 2023-08-26 RX ORDER — ACETAMINOPHEN 325 MG/1
650 TABLET ORAL EVERY 6 HOURS PRN
Status: DISCONTINUED | OUTPATIENT
Start: 2023-08-26 | End: 2023-08-29 | Stop reason: HOSPADM

## 2023-08-26 RX ORDER — LOSARTAN POTASSIUM 50 MG/1
100 TABLET ORAL DAILY
Status: DISCONTINUED | OUTPATIENT
Start: 2023-08-26 | End: 2023-08-29 | Stop reason: HOSPADM

## 2023-08-26 RX ORDER — ERGOCALCIFEROL 1.25 MG/1
50000 CAPSULE ORAL WEEKLY
Status: DISCONTINUED | OUTPATIENT
Start: 2023-08-26 | End: 2023-08-26

## 2023-08-26 RX ORDER — DEXTROSE MONOHYDRATE 100 MG/ML
INJECTION, SOLUTION INTRAVENOUS CONTINUOUS PRN
Status: DISCONTINUED | OUTPATIENT
Start: 2023-08-26 | End: 2023-08-29 | Stop reason: HOSPADM

## 2023-08-26 RX ORDER — FINASTERIDE 5 MG/1
5 TABLET, FILM COATED ORAL DAILY
Status: DISCONTINUED | OUTPATIENT
Start: 2023-08-26 | End: 2023-08-29 | Stop reason: HOSPADM

## 2023-08-26 RX ORDER — AMLODIPINE BESYLATE 10 MG/1
10 TABLET ORAL DAILY
Status: DISCONTINUED | OUTPATIENT
Start: 2023-08-26 | End: 2023-08-26

## 2023-08-26 RX ORDER — SEVELAMER CARBONATE 800 MG/1
800 TABLET, FILM COATED ORAL
Status: DISCONTINUED | OUTPATIENT
Start: 2023-08-26 | End: 2023-08-29 | Stop reason: HOSPADM

## 2023-08-26 RX ORDER — ONDANSETRON 4 MG/1
4 TABLET, ORALLY DISINTEGRATING ORAL EVERY 8 HOURS PRN
Status: DISCONTINUED | OUTPATIENT
Start: 2023-08-26 | End: 2023-08-29 | Stop reason: HOSPADM

## 2023-08-26 RX ORDER — ACETAMINOPHEN 650 MG/1
650 SUPPOSITORY RECTAL EVERY 6 HOURS PRN
Status: DISCONTINUED | OUTPATIENT
Start: 2023-08-26 | End: 2023-08-29 | Stop reason: HOSPADM

## 2023-08-26 RX ORDER — LEVOTHYROXINE SODIUM 0.05 MG/1
50 TABLET ORAL DAILY
Status: DISCONTINUED | OUTPATIENT
Start: 2023-08-26 | End: 2023-08-29 | Stop reason: HOSPADM

## 2023-08-26 RX ORDER — SODIUM CHLORIDE 9 MG/ML
INJECTION, SOLUTION INTRAVENOUS PRN
Status: DISCONTINUED | OUTPATIENT
Start: 2023-08-26 | End: 2023-08-29 | Stop reason: HOSPADM

## 2023-08-26 RX ORDER — POLYETHYLENE GLYCOL 3350 17 G/17G
17 POWDER, FOR SOLUTION ORAL DAILY PRN
Status: DISCONTINUED | OUTPATIENT
Start: 2023-08-26 | End: 2023-08-29 | Stop reason: HOSPADM

## 2023-08-26 RX ORDER — PANTOPRAZOLE SODIUM 40 MG/1
40 TABLET, DELAYED RELEASE ORAL DAILY
Status: DISCONTINUED | OUTPATIENT
Start: 2023-08-26 | End: 2023-08-29 | Stop reason: HOSPADM

## 2023-08-26 RX ORDER — SODIUM CHLORIDE 0.9 % (FLUSH) 0.9 %
5-40 SYRINGE (ML) INJECTION EVERY 12 HOURS SCHEDULED
Status: DISCONTINUED | OUTPATIENT
Start: 2023-08-26 | End: 2023-08-29 | Stop reason: HOSPADM

## 2023-08-26 RX ORDER — ATORVASTATIN CALCIUM 80 MG/1
80 TABLET, FILM COATED ORAL DAILY
Status: DISCONTINUED | OUTPATIENT
Start: 2023-08-26 | End: 2023-08-29 | Stop reason: HOSPADM

## 2023-08-26 RX ADMIN — LEVOTHYROXINE SODIUM 50 MCG: 50 TABLET ORAL at 06:04

## 2023-08-26 RX ADMIN — METOPROLOL TARTRATE 25 MG: 25 TABLET, FILM COATED ORAL at 20:46

## 2023-08-26 RX ADMIN — FINASTERIDE 5 MG: 5 TABLET, FILM COATED ORAL at 09:27

## 2023-08-26 RX ADMIN — HEPARIN SODIUM 5000 UNITS: 5000 INJECTION INTRAVENOUS; SUBCUTANEOUS at 00:50

## 2023-08-26 RX ADMIN — ATORVASTATIN CALCIUM 80 MG: 80 TABLET, FILM COATED ORAL at 09:28

## 2023-08-26 RX ADMIN — SEVELAMER CARBONATE 800 MG: 800 TABLET, FILM COATED ORAL at 18:31

## 2023-08-26 RX ADMIN — HEPARIN SODIUM 5000 UNITS: 5000 INJECTION INTRAVENOUS; SUBCUTANEOUS at 15:23

## 2023-08-26 RX ADMIN — Medication 5000 UNITS: at 09:27

## 2023-08-26 RX ADMIN — LOSARTAN POTASSIUM 100 MG: 50 TABLET, FILM COATED ORAL at 09:31

## 2023-08-26 RX ADMIN — ASPIRIN 81 MG: 81 TABLET, COATED ORAL at 09:27

## 2023-08-26 RX ADMIN — SODIUM CHLORIDE, PRESERVATIVE FREE 10 ML: 5 INJECTION INTRAVENOUS at 20:46

## 2023-08-26 RX ADMIN — METOPROLOL TARTRATE 25 MG: 25 TABLET, FILM COATED ORAL at 00:55

## 2023-08-26 RX ADMIN — METOPROLOL TARTRATE 25 MG: 25 TABLET, FILM COATED ORAL at 09:27

## 2023-08-26 RX ADMIN — SODIUM CHLORIDE, PRESERVATIVE FREE 10 ML: 5 INJECTION INTRAVENOUS at 09:32

## 2023-08-26 RX ADMIN — AMLODIPINE BESYLATE 2.5 MG: 2.5 TABLET ORAL at 09:27

## 2023-08-26 RX ADMIN — HEPARIN SODIUM 5000 UNITS: 5000 INJECTION INTRAVENOUS; SUBCUTANEOUS at 21:38

## 2023-08-26 RX ADMIN — PANTOPRAZOLE SODIUM 40 MG: 40 TABLET, DELAYED RELEASE ORAL at 06:04

## 2023-08-26 RX ADMIN — SEVELAMER CARBONATE 800 MG: 800 TABLET, FILM COATED ORAL at 09:27

## 2023-08-26 RX ADMIN — HEPARIN SODIUM 5000 UNITS: 5000 INJECTION INTRAVENOUS; SUBCUTANEOUS at 06:04

## 2023-08-26 NOTE — PLAN OF CARE
Problem: Safety - Adult  Goal: Free from fall injury  Outcome: Progressing  Flowsheets (Taken 8/26/2023 0145)  Free From Fall Injury: Based on caregiver fall risk screen, instruct family/caregiver to ask for assistance with transferring infant if caregiver noted to have fall risk factors     Problem: ABCDS Injury Assessment  Goal: Absence of physical injury  Outcome: Progressing  Flowsheets (Taken 8/26/2023 0145)  Absence of Physical Injury: Implement safety measures based on patient assessment     Problem: Discharge Planning  Goal: Discharge to home or other facility with appropriate resources  Outcome: Progressing

## 2023-08-26 NOTE — ED NOTES
ED TO INPATIENT SBAR HANDOFF    Patient Name: Shalini Beltran   :  1950  74 y.o. MRN:  [id number]  Preferred Name  [facility]  ED Room #:  Y14/E05-91  Family/Caregiver Present yes   Restraints no   Sitter no   Sepsis Risk Score Sepsis Risk Score: 1.54    Situation  Code Status: Prior No additional code details. Allergies: Patient has no known allergies. Weight: Patient Vitals for the past 96 hrs (Last 3 readings):   Weight   23 169 lb 8 oz (76.9 kg)     Arrived from: home  Chief Complaint:   Chief Complaint   Patient presents with    Other     Recent fall and surgery for R hip fracture. Was sent to rehab from [address] [place] [address] he was there for about a day and did not like th care he received so he went home. Sts that today he was scooting himself up the stairs at home and was unable to get up to standing once he reached to top of the stairs. Hospital Problem/Diagnosis:  Principal Problem:    End stage chronic kidney disease ([address])  Resolved Problems:    * No resolved hospital problems. *    Imaging:   XR FEMUR RIGHT (MIN 2 VIEWS)   Final Result   Impression:    Status post fixation of displaced intertrochanteric proximal right femur fracture. No prior studies are available for comparison. Electronically signed by Mitch Salgado MD      XR KNEE RIGHT (1-2 VIEWS)   Final Result   Impression:    No acute osseous injury. Electronically signed by Mitch Salgado MD        Abnormal labs:   Abnormal Labs Reviewed   BASIC METABOLIC PANEL W/ REFLEX TO MG FOR LOW K - Abnormal; Notable for the following components:       Result Value    Potassium reflex Magnesium 5.2 (*)     Glucose 119 (*)     BUN 63 (*)     Creatinine 4.9 (*)     Est, Glom Filt Rate 12 (*)     All other components within normal limits     Critical values: no     Abnormal Assessment Findings: Has had difficulty bearing weight since R hip/femur surgery.  Has walker at home that he has been using but was unable to stand from a sitting position at

## 2023-08-26 NOTE — ED PROVIDER NOTES
ED Attending Attestation Note     Date of evaluation: 8/25/2023    This patient was seen by the resident. I have seen and examined the patient, agree with the workup, evaluation, management and diagnosis. The care plan has been discussed. I was present for any procedures performed in the resident's  note and have made edits to the note where appropriate. My assessment reveals 74 y.o. male with history of ESRD on hemodialysis MWF, recent femur fracture status post ORIF presenting to the emergency department today for postop difficulty and issues managing his own care at home. Was recently discharged from an outside hospital to rehab, was not content with the care there, and left to return home. He has been having significant difficulty getting around the house due to multiple stairs at home and has been having to lift himself with his arms backwards up the stairs. He has not had dialysis since Wednesday. We will obtain repeat x-rays as well as a BMP, but doubt he has an indication for emergent dialysis at this time.         Anna Delacruz MD  08/25/23 2173
(36.8 [address] [facility]), Pulse: 70,Respirations: 18, SpO2: 99 %     Procedures     N/a    ED Course     Nursing Notes, Past Medical Hx, Past Surgical Hx, Social Hx, Allergies, and Family Hx were reviewed. The patient was given the following medications:  No orders of the defined types were placed in this encounter. CONSULTS:  IP CONSULT TO PRIMARY CARE PROVIDER    Review of Systems     A comprehensive and complete review of systems was obtained. Please refer to the HPI for pertinent positives and negatives. Past Medical, Surgical, Family, and Social History     He has a past medical history of Fainted, High cholesterol, Hypertension, Hypothyroid, Kidney disease, and Type II or unspecified type diabetes mellitus without mention of complication, not stated as uncontrolled. He has a past surgical history that includes Knee arthroscopy (Left); Hand surgery (Right); Cardiac surgery; Dialysis fistula creation (Right); other surgical history; and Colonoscopy. His family history includes Cancer in his mother; Heart Disease in his father and sister; Kidney Disease in his sister; Stroke in his father. He reports that he has never smoked. He has never used smokeless tobacco. He reports that he does not currently use alcohol. He reports that he does not use drugs. Medications     Previous Medications    ACCU-CHEK FASTCLIX LANCETS MISC    1 each by Does not apply route 5 times daily    AMLODIPINE (NORVASC) 10 MG TABLET    Take 10 mg by mouth daily    ASPIRIN EC 81 MG EC TABLET    Take 1 tablet by mouth daily.     ATORVASTATIN (LIPITOR) 80 MG TABLET    Take 80 mg by mouth daily    B COMPLEX-C-FOLIC ACID (STRESS FORMULA PO)    Take by mouth    CHOLECALCIFEROL (VITAMIN D3) 125 MCG (5000 UT) TABS    Take 1 tablet by mouth daily    CLOPIDOGREL (PLAVIX) 75 MG TABLET    Take 75 mg by mouth daily    DULAGLUTIDE (TRULICITY) 4.5 DP/7.1XO SOPN    Inject 4.5 mg into the skin once a week    ERGOCALCIFEROL (DRISDOL) 54840 UNITS

## 2023-08-26 NOTE — H&P
Hospital Medicine History & Physical      Date of Admission: 8/25/2023    Date of Service:  8/25/2023    [x]Admitted to Inpatient with expected LOS greater than two midnights due to medical therapy. []Placed in Observation status. Chief Admission Complaint:    ESRD admitted post rehab for continued weakness and inability to ambulate  Presenting Admission History: This is a 20-year-old male with past medical history of end-stage renal disease on dialysis Monday Wednesday Friday, CAD s/p CABG and PCI, type 2 diabetes mellitus, hyperlipidemia, hypertension who presented to the emergency department with weakness. Patient was admitted to [facility] last week after sustaining a hip fracture. He had surgery and was released from the hospital 2 days ago. After the hospital patient was discharged to rehab facility. Patient had about a day and a half stay at the rehab facility. He stated that he was told his walking was okay. He decided to go home. Upon arrival home today he could not ambulate due to pain as well as generalized weakness. He did not receive his dialysis session. Patient spoke to his nephrologist who recommended admission to the hospital.  At the time of evaluation patient denies any chest pain, shortness of breath, nausea, vomiting, abdominal pain, diarrhea fevers or chills. Assessment/Plan:      #Ambulatory dysfunction  Intratrochanteric fracture s/p repair on 8/12  -Discharged home from [facility] after a day and a half stay at the rehab.   Upon arrival home patient could not ambulate prompting him to come to the ED  PT/OT  Patient will likely need rehab placement    #ESRD  Last dialysis Wednesday  No severe electrolyte abnormalities  Plan for hemodialysis tomorrow per nephrology      #CAD s/p CABG/PCI  Continue with statin and aspirin    #Type 2 diabetes mellitus  Controlled on Trulicity  Y2X around 5  Sliding scale insulin    #Hypertension-continue losartan and

## 2023-08-26 NOTE — CONSULTS
Nephrology Consult Note                                                                                                                                                                                                                                                                                                                                                               Office : [phone]     Fax :[phone]              Patient's Name: Michelle Maldonado  11:09 AM  8/26/2023      Assessment/Plan     1. ESRD on HHD     2. HTN    3. Anemia    4. Acid- base/ Electrolyte imbalance     5. Femur fx  - S/p ORIF on 8/12    6. Weakness     Plan:  - HD today   - BP management   - Anemia management   - MBD management   - Renally dose meds for ESRD  - Close monitoring of renal labs       Reason for Consult:  ESRD  Requesting Physician:  Hui Robb      Chief Complaint:  Weakness     History of Present Ilness:    Michelle Maldonado is a 74 y.o. male with ESRD, CAD s/p CABG, DM2, HLD, HTN, and recent history of femur fracture s/p ORIF 8/12, who presents with weakness. We are consulted for ESRD and HD management.        Interval hx:    Seen on HD  BP controlled  Doing OK  No complaints       Past Medical History:   Diagnosis Date    Fainted 06/2017    High cholesterol     Hypertension     Hypothyroid     Kidney disease     dialysis: Right arm fistula; states will be doing dialysis at home    Type II or unspecified type diabetes mellitus without mention of complication, not stated as uncontrolled        Past Surgical History:   Procedure Laterality Date    CARDIAC SURGERY      Jan 2022    COLONOSCOPY      last colonscopy was 2022    DIALYSIS FISTULA CREATION Right     right arm fistula Dec. 2020    HAND SURGERY Right     carpal tunnel    KNEE ARTHROSCOPY Left     OTHER SURGICAL HISTORY      heart stents Jan 2023       Family History   Problem Relation Age of Onset    Cancer Mother         colon    Heart Disease Father

## 2023-08-27 LAB
ALBUMIN SERPL-MCNC: 3.5 G/DL (ref 3.4–5)
ALBUMIN/GLOB SERPL: 1 {RATIO} (ref 1.1–2.2)
ALP SERPL-CCNC: 106 U/L (ref 40–129)
ALT SERPL-CCNC: 8 U/L (ref 10–40)
ANION GAP SERPL CALCULATED.3IONS-SCNC: 13 MMOL/L (ref 3–16)
AST SERPL-CCNC: 16 U/L (ref 15–37)
BASOPHILS # BLD: 0.1 K/UL (ref 0–0.2)
BASOPHILS NFR BLD: 1.2 %
BILIRUB SERPL-MCNC: 0.9 MG/DL (ref 0–1)
BUN SERPL-MCNC: 55 MG/DL (ref 7–20)
CALCIUM SERPL-MCNC: 8.9 MG/DL (ref 8.3–10.6)
CHLORIDE SERPL-SCNC: 103 MMOL/L (ref 99–110)
CO2 SERPL-SCNC: 23 MMOL/L (ref 21–32)
CREAT SERPL-MCNC: 4.2 MG/DL (ref 0.8–1.3)
DEPRECATED RDW RBC AUTO: 18.2 % (ref 12.4–15.4)
EOSINOPHIL # BLD: 0.3 K/UL (ref 0–0.6)
EOSINOPHIL NFR BLD: 2.5 %
GFR SERPLBLD CREATININE-BSD FMLA CKD-EPI: 14 ML/MIN/{1.73_M2}
GLUCOSE BLD-MCNC: 132 MG/DL (ref 70–99)
GLUCOSE BLD-MCNC: 151 MG/DL (ref 70–99)
GLUCOSE BLD-MCNC: 166 MG/DL (ref 70–99)
GLUCOSE BLD-MCNC: 91 MG/DL (ref 70–99)
GLUCOSE SERPL-MCNC: 94 MG/DL (ref 70–99)
HCT VFR BLD AUTO: 28.4 % (ref 40.5–52.5)
HGB BLD-MCNC: 9.6 G/DL (ref 13.5–17.5)
LYMPHOCYTES # BLD: 2.2 K/UL (ref 1–5.1)
LYMPHOCYTES NFR BLD: 21.8 %
MAGNESIUM SERPL-MCNC: 2.2 MG/DL (ref 1.8–2.4)
MCH RBC QN AUTO: 29.2 PG (ref 26–34)
MCHC RBC AUTO-ENTMCNC: 33.8 G/DL (ref 31–36)
MCV RBC AUTO: 86.5 FL (ref 80–100)
MONOCYTES # BLD: 1 K/UL (ref 0–1.3)
MONOCYTES NFR BLD: 9.5 %
NEUTROPHILS # BLD: 6.5 K/UL (ref 1.7–7.7)
NEUTROPHILS NFR BLD: 65 %
PERFORMED ON: ABNORMAL
PERFORMED ON: NORMAL
PHOSPHATE SERPL-MCNC: 4.7 MG/DL (ref 2.5–4.9)
PLATELET # BLD AUTO: 381 K/UL (ref 135–450)
PMV BLD AUTO: 6 FL (ref 5–10.5)
POTASSIUM SERPL-SCNC: 5.2 MMOL/L (ref 3.5–5.1)
PROT SERPL-MCNC: 6.9 G/DL (ref 6.4–8.2)
RBC # BLD AUTO: 3.29 M/UL (ref 4.2–5.9)
SODIUM SERPL-SCNC: 139 MMOL/L (ref 136–145)
WBC # BLD AUTO: 10.1 K/UL (ref 4–11)

## 2023-08-27 PROCEDURE — 83735 ASSAY OF MAGNESIUM: CPT

## 2023-08-27 PROCEDURE — 99233 SBSQ HOSP IP/OBS HIGH 50: CPT | Performed by: INTERNAL MEDICINE

## 2023-08-27 PROCEDURE — 6360000002 HC RX W HCPCS: Performed by: STUDENT IN AN ORGANIZED HEALTH CARE EDUCATION/TRAINING PROGRAM

## 2023-08-27 PROCEDURE — 97116 GAIT TRAINING THERAPY: CPT

## 2023-08-27 PROCEDURE — 97162 PT EVAL MOD COMPLEX 30 MIN: CPT

## 2023-08-27 PROCEDURE — 2580000003 HC RX 258: Performed by: STUDENT IN AN ORGANIZED HEALTH CARE EDUCATION/TRAINING PROGRAM

## 2023-08-27 PROCEDURE — 6370000000 HC RX 637 (ALT 250 FOR IP): Performed by: STUDENT IN AN ORGANIZED HEALTH CARE EDUCATION/TRAINING PROGRAM

## 2023-08-27 PROCEDURE — 80053 COMPREHEN METABOLIC PANEL: CPT

## 2023-08-27 PROCEDURE — 85025 COMPLETE CBC W/AUTO DIFF WBC: CPT

## 2023-08-27 PROCEDURE — 36415 COLL VENOUS BLD VENIPUNCTURE: CPT

## 2023-08-27 PROCEDURE — 6370000000 HC RX 637 (ALT 250 FOR IP): Performed by: NURSE PRACTITIONER

## 2023-08-27 PROCEDURE — 84100 ASSAY OF PHOSPHORUS: CPT

## 2023-08-27 PROCEDURE — 97535 SELF CARE MNGMENT TRAINING: CPT

## 2023-08-27 PROCEDURE — 97166 OT EVAL MOD COMPLEX 45 MIN: CPT

## 2023-08-27 PROCEDURE — 1200000000 HC SEMI PRIVATE

## 2023-08-27 PROCEDURE — 97530 THERAPEUTIC ACTIVITIES: CPT

## 2023-08-27 RX ORDER — AMLODIPINE BESYLATE 10 MG/1
10 TABLET ORAL DAILY
Status: DISCONTINUED | OUTPATIENT
Start: 2023-08-28 | End: 2023-08-29 | Stop reason: HOSPADM

## 2023-08-27 RX ADMIN — Medication 5000 UNITS: at 09:49

## 2023-08-27 RX ADMIN — FINASTERIDE 5 MG: 5 TABLET, FILM COATED ORAL at 09:50

## 2023-08-27 RX ADMIN — SODIUM CHLORIDE, PRESERVATIVE FREE 10 ML: 5 INJECTION INTRAVENOUS at 20:27

## 2023-08-27 RX ADMIN — LEVOTHYROXINE SODIUM 50 MCG: 50 TABLET ORAL at 05:29

## 2023-08-27 RX ADMIN — METOPROLOL TARTRATE 25 MG: 25 TABLET, FILM COATED ORAL at 20:27

## 2023-08-27 RX ADMIN — HEPARIN SODIUM 5000 UNITS: 5000 INJECTION INTRAVENOUS; SUBCUTANEOUS at 13:39

## 2023-08-27 RX ADMIN — LOSARTAN POTASSIUM 100 MG: 50 TABLET, FILM COATED ORAL at 09:50

## 2023-08-27 RX ADMIN — HEPARIN SODIUM 5000 UNITS: 5000 INJECTION INTRAVENOUS; SUBCUTANEOUS at 20:26

## 2023-08-27 RX ADMIN — SEVELAMER CARBONATE 800 MG: 800 TABLET, FILM COATED ORAL at 18:33

## 2023-08-27 RX ADMIN — HEPARIN SODIUM 5000 UNITS: 5000 INJECTION INTRAVENOUS; SUBCUTANEOUS at 05:30

## 2023-08-27 RX ADMIN — SEVELAMER CARBONATE 800 MG: 800 TABLET, FILM COATED ORAL at 09:50

## 2023-08-27 RX ADMIN — METOPROLOL TARTRATE 25 MG: 25 TABLET, FILM COATED ORAL at 09:49

## 2023-08-27 RX ADMIN — SODIUM CHLORIDE, PRESERVATIVE FREE 10 ML: 5 INJECTION INTRAVENOUS at 09:53

## 2023-08-27 RX ADMIN — AMLODIPINE BESYLATE 2.5 MG: 2.5 TABLET ORAL at 09:49

## 2023-08-27 RX ADMIN — ASPIRIN 81 MG: 81 TABLET, COATED ORAL at 09:50

## 2023-08-27 RX ADMIN — SEVELAMER CARBONATE 800 MG: 800 TABLET, FILM COATED ORAL at 13:38

## 2023-08-27 RX ADMIN — PANTOPRAZOLE SODIUM 40 MG: 40 TABLET, DELAYED RELEASE ORAL at 05:29

## 2023-08-27 RX ADMIN — ATORVASTATIN CALCIUM 80 MG: 80 TABLET, FILM COATED ORAL at 09:49

## 2023-08-27 NOTE — DISCHARGE INSTR - COC
Continuity of Care Form    Patient Name: Dannie Venegas   :  1950  MRN:  [id number]    Admit date:  2023  Discharge date:      Code Status Order: Full Code   Advance Directives:     Admitting Physician: Peyton Saenz MD  PCP: Kimberlyn Hill    Discharging Nurse: [facility] Unit/Room#: 9007/9850-82  Discharging Unit Phone Number: ***    Emergency Contact:   Extended Emergency Contact Information  Primary Emergency Contact: Jessica Girard  Address: [address] [place] [address] [address] [facility] [address] [employer] Phone: [phone]  Mobile Phone: [phone]  Relation: None    Past Surgical History:  Past Surgical History:   Procedure Laterality Date    CARDIAC SURGERY      2022    COLONOSCOPY      last colonscopy was     DIALYSIS FISTULA CREATION Right     right arm fistula Dec. 2020    HAND SURGERY Right     carpal tunnel    KNEE ARTHROSCOPY Left     OTHER SURGICAL HISTORY      heart stents 2023       Immunization History: There is no immunization history for the selected administration types on file for this patient.     Active Problems:  Patient Active Problem List   Diagnosis Code    Type II or 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, uncontrolled(250.62) E11.49    Diabetic polyneuropathy ([address]) E11.42    Diabetes mellitus with background retinopathy ([address]) E11.3299    Other and unspecified hyperlipidemia E78.5    Type II or 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, not stated as uncontrolled(250.60) E11.49    Nephritis and nephropathy, not specified as acute or chronic, with other specified pathological lesion in kidney, in diseases classified elsewhere N05.8    Sepsis ([address]) A41.9    Anemia in ESRD (end-stage renal disease) (HCC) N18.6, D63.1    Electrolyte imbalance E87.8    Hypertension I10       Isolation/Infection:   Isolation            No Isolation          Patient Infection Status

## 2023-08-27 NOTE — PLAN OF CARE
Problem: ABCDS Injury Assessment  Goal: Absence of physical injury  Outcome: Progressing     Problem: Safety - Adult  Goal: Free from fall injury  Outcome: Progressing     Problem: Chronic Conditions and Co-morbidities  Goal: Patient's chronic conditions and co-morbidity symptoms are monitored and maintained or improved  Outcome: Progressing     Problem: Skin/Tissue Integrity  Goal: Absence of new skin breakdown  Description: 1. Monitor for areas of redness and/or skin breakdown  2. Assess vascular access sites hourly  3. Every 4-6 hours minimum:  Change oxygen saturation probe site  4. Every 4-6 hours:  If on nasal continuous positive airway pressure, respiratory therapy assess nares and determine need for appliance change or resting period.   Outcome: Progressing     Problem: Discharge Planning  Goal: Discharge to home or other facility with appropriate resources  Outcome: Progressing

## 2023-08-28 LAB
ALBUMIN SERPL-MCNC: 3.6 G/DL (ref 3.4–5)
ALBUMIN/GLOB SERPL: 1.1 {RATIO} (ref 1.1–2.2)
ALP SERPL-CCNC: 109 U/L (ref 40–129)
ALT SERPL-CCNC: 8 U/L (ref 10–40)
ANION GAP SERPL CALCULATED.3IONS-SCNC: 15 MMOL/L (ref 3–16)
AST SERPL-CCNC: 13 U/L (ref 15–37)
BASOPHILS # BLD: 0.1 K/UL (ref 0–0.2)
BASOPHILS NFR BLD: 0.9 %
BILIRUB SERPL-MCNC: 0.7 MG/DL (ref 0–1)
BUN SERPL-MCNC: 68 MG/DL (ref 7–20)
CALCIUM SERPL-MCNC: 8.7 MG/DL (ref 8.3–10.6)
CHLORIDE SERPL-SCNC: 105 MMOL/L (ref 99–110)
CO2 SERPL-SCNC: 23 MMOL/L (ref 21–32)
CREAT SERPL-MCNC: 4.9 MG/DL (ref 0.8–1.3)
DEPRECATED RDW RBC AUTO: 18.5 % (ref 12.4–15.4)
EOSINOPHIL # BLD: 0.3 K/UL (ref 0–0.6)
EOSINOPHIL NFR BLD: 2.4 %
GFR SERPLBLD CREATININE-BSD FMLA CKD-EPI: 12 ML/MIN/{1.73_M2}
GLUCOSE BLD-MCNC: 124 MG/DL (ref 70–99)
GLUCOSE BLD-MCNC: 141 MG/DL (ref 70–99)
GLUCOSE BLD-MCNC: 141 MG/DL (ref 70–99)
GLUCOSE BLD-MCNC: 156 MG/DL (ref 70–99)
GLUCOSE BLD-MCNC: 96 MG/DL (ref 70–99)
GLUCOSE SERPL-MCNC: 120 MG/DL (ref 70–99)
HCT VFR BLD AUTO: 28.6 % (ref 40.5–52.5)
HGB BLD-MCNC: 9.6 G/DL (ref 13.5–17.5)
LYMPHOCYTES # BLD: 2 K/UL (ref 1–5.1)
LYMPHOCYTES NFR BLD: 18.8 %
MAGNESIUM SERPL-MCNC: 2.3 MG/DL (ref 1.8–2.4)
MCH RBC QN AUTO: 28.8 PG (ref 26–34)
MCHC RBC AUTO-ENTMCNC: 33.6 G/DL (ref 31–36)
MCV RBC AUTO: 85.6 FL (ref 80–100)
MONOCYTES # BLD: 1 K/UL (ref 0–1.3)
MONOCYTES NFR BLD: 9 %
NEUTROPHILS # BLD: 7.4 K/UL (ref 1.7–7.7)
NEUTROPHILS NFR BLD: 68.9 %
PERFORMED ON: ABNORMAL
PERFORMED ON: NORMAL
PHOSPHATE SERPL-MCNC: 5.1 MG/DL (ref 2.5–4.9)
PLATELET # BLD AUTO: 423 K/UL (ref 135–450)
PMV BLD AUTO: 6.4 FL (ref 5–10.5)
POTASSIUM SERPL-SCNC: 4.9 MMOL/L (ref 3.5–5.1)
PROT SERPL-MCNC: 7 G/DL (ref 6.4–8.2)
RBC # BLD AUTO: 3.34 M/UL (ref 4.2–5.9)
SODIUM SERPL-SCNC: 143 MMOL/L (ref 136–145)
WBC # BLD AUTO: 10.7 K/UL (ref 4–11)

## 2023-08-28 PROCEDURE — 6370000000 HC RX 637 (ALT 250 FOR IP): Performed by: PHYSICIAN ASSISTANT

## 2023-08-28 PROCEDURE — 2580000003 HC RX 258: Performed by: STUDENT IN AN ORGANIZED HEALTH CARE EDUCATION/TRAINING PROGRAM

## 2023-08-28 PROCEDURE — 99233 SBSQ HOSP IP/OBS HIGH 50: CPT | Performed by: INTERNAL MEDICINE

## 2023-08-28 PROCEDURE — 1200000000 HC SEMI PRIVATE

## 2023-08-28 PROCEDURE — 6360000002 HC RX W HCPCS: Performed by: STUDENT IN AN ORGANIZED HEALTH CARE EDUCATION/TRAINING PROGRAM

## 2023-08-28 PROCEDURE — 90935 HEMODIALYSIS ONE EVALUATION: CPT

## 2023-08-28 PROCEDURE — 5A1D70Z PERFORMANCE OF URINARY FILTRATION, INTERMITTENT, LESS THAN 6 HOURS PER DAY: ICD-10-PCS | Performed by: INTERNAL MEDICINE

## 2023-08-28 PROCEDURE — 84100 ASSAY OF PHOSPHORUS: CPT

## 2023-08-28 PROCEDURE — 6360000002 HC RX W HCPCS: Performed by: INTERNAL MEDICINE

## 2023-08-28 PROCEDURE — 85025 COMPLETE CBC W/AUTO DIFF WBC: CPT

## 2023-08-28 PROCEDURE — 36415 COLL VENOUS BLD VENIPUNCTURE: CPT

## 2023-08-28 PROCEDURE — 83735 ASSAY OF MAGNESIUM: CPT

## 2023-08-28 PROCEDURE — 80053 COMPREHEN METABOLIC PANEL: CPT

## 2023-08-28 PROCEDURE — 6370000000 HC RX 637 (ALT 250 FOR IP): Performed by: STUDENT IN AN ORGANIZED HEALTH CARE EDUCATION/TRAINING PROGRAM

## 2023-08-28 RX ORDER — TRAMADOL HYDROCHLORIDE 50 MG/1
50 TABLET ORAL ONCE
Status: DISCONTINUED | OUTPATIENT
Start: 2023-08-28 | End: 2023-08-29 | Stop reason: HOSPADM

## 2023-08-28 RX ORDER — HYDRALAZINE HYDROCHLORIDE 20 MG/ML
10 INJECTION INTRAMUSCULAR; INTRAVENOUS EVERY 4 HOURS PRN
Status: DISCONTINUED | OUTPATIENT
Start: 2023-08-28 | End: 2023-08-29 | Stop reason: HOSPADM

## 2023-08-28 RX ADMIN — SEVELAMER CARBONATE 800 MG: 800 TABLET, FILM COATED ORAL at 18:25

## 2023-08-28 RX ADMIN — HEPARIN SODIUM 5000 UNITS: 5000 INJECTION INTRAVENOUS; SUBCUTANEOUS at 06:01

## 2023-08-28 RX ADMIN — SODIUM CHLORIDE, PRESERVATIVE FREE 10 ML: 5 INJECTION INTRAVENOUS at 09:06

## 2023-08-28 RX ADMIN — PANTOPRAZOLE SODIUM 40 MG: 40 TABLET, DELAYED RELEASE ORAL at 06:05

## 2023-08-28 RX ADMIN — Medication 5000 UNITS: at 09:03

## 2023-08-28 RX ADMIN — SODIUM CHLORIDE, PRESERVATIVE FREE 10 ML: 5 INJECTION INTRAVENOUS at 21:30

## 2023-08-28 RX ADMIN — METOPROLOL TARTRATE 25 MG: 25 TABLET, FILM COATED ORAL at 21:29

## 2023-08-28 RX ADMIN — LEVOTHYROXINE SODIUM 50 MCG: 50 TABLET ORAL at 06:05

## 2023-08-28 RX ADMIN — METOPROLOL TARTRATE 25 MG: 25 TABLET, FILM COATED ORAL at 09:04

## 2023-08-28 RX ADMIN — FINASTERIDE 5 MG: 5 TABLET, FILM COATED ORAL at 09:04

## 2023-08-28 RX ADMIN — LOSARTAN POTASSIUM 100 MG: 50 TABLET, FILM COATED ORAL at 09:03

## 2023-08-28 RX ADMIN — SEVELAMER CARBONATE 800 MG: 800 TABLET, FILM COATED ORAL at 09:04

## 2023-08-28 RX ADMIN — AMLODIPINE BESYLATE 10 MG: 10 TABLET ORAL at 09:04

## 2023-08-28 RX ADMIN — HYDRALAZINE HYDROCHLORIDE 10 MG: 20 INJECTION INTRAMUSCULAR; INTRAVENOUS at 06:01

## 2023-08-28 RX ADMIN — ATORVASTATIN CALCIUM 80 MG: 80 TABLET, FILM COATED ORAL at 09:04

## 2023-08-28 RX ADMIN — HEPARIN SODIUM 5000 UNITS: 5000 INJECTION INTRAVENOUS; SUBCUTANEOUS at 21:30

## 2023-08-28 RX ADMIN — ASPIRIN 81 MG: 81 TABLET, COATED ORAL at 09:04

## 2023-08-28 RX ADMIN — HEPARIN SODIUM 5000 UNITS: 5000 INJECTION INTRAVENOUS; SUBCUTANEOUS at 18:32

## 2023-08-28 RX ADMIN — SEVELAMER CARBONATE 800 MG: 800 TABLET, FILM COATED ORAL at 12:43

## 2023-08-28 ASSESSMENT — PAIN SCALES - GENERAL: PAINLEVEL_OUTOF10: 0

## 2023-08-28 NOTE — PLAN OF CARE
Problem: Discharge Planning  Goal: Discharge to home or other facility with appropriate resources  Outcome: Progressing  Flowsheets  Taken 8/27/2023 2239  Discharge to home or other facility with appropriate resources: Identify barriers to discharge with patient and caregiver  Taken 8/27/2023 2000  Discharge to home or other facility with appropriate resources: Identify barriers to discharge with patient and caregiver     Problem: Pain  Goal: Verbalizes/displays adequate comfort level or baseline comfort level  Outcome: Progressing     Problem: ABCDS Injury Assessment  Goal: Absence of physical injury  Outcome: Progressing     Problem: Safety - Adult  Goal: Free from fall injury  Outcome: Progressing     Problem: Chronic Conditions and Co-morbidities  Goal: Patient's chronic conditions and co-morbidity symptoms are monitored and maintained or improved  Outcome: Progressing  Flowsheets  Taken 8/27/2023 2239  Care Plan - Patient's Chronic Conditions and Co-Morbidity Symptoms are Monitored and Maintained or Improved: Monitor and assess patient's chronic conditions and comorbid symptoms for stability, deterioration, or improvement  Taken 8/27/2023 2000  Care Plan - Patient's Chronic Conditions and Co-Morbidity Symptoms are Monitored and Maintained or Improved: Monitor and assess patient's chronic conditions and comorbid symptoms for stability, deterioration, or improvement     Problem: Skin/Tissue Integrity  Goal: Absence of new skin breakdown  Description: 1. Monitor for areas of redness and/or skin breakdown  2. Assess vascular access sites hourly  3. Every 4-6 hours minimum:  Change oxygen saturation probe site  4. Every 4-6 hours:  If on nasal continuous positive airway pressure, respiratory therapy assess nares and determine need for appliance change or resting period.   Outcome: Progressing

## 2023-08-29 VITALS
OXYGEN SATURATION: 98 % | DIASTOLIC BLOOD PRESSURE: 53 MMHG | RESPIRATION RATE: 16 BRPM | TEMPERATURE: 98.3 F | WEIGHT: 154.76 LBS | SYSTOLIC BLOOD PRESSURE: 156 MMHG | HEART RATE: 64 BPM | BODY MASS INDEX: 22.92 KG/M2 | HEIGHT: 69 IN

## 2023-08-29 LAB
ALBUMIN SERPL-MCNC: 3.7 G/DL (ref 3.4–5)
ALBUMIN/GLOB SERPL: 1.1 {RATIO} (ref 1.1–2.2)
ALP SERPL-CCNC: 112 U/L (ref 40–129)
ALT SERPL-CCNC: 6 U/L (ref 10–40)
ANION GAP SERPL CALCULATED.3IONS-SCNC: 13 MMOL/L (ref 3–16)
AST SERPL-CCNC: 16 U/L (ref 15–37)
BASOPHILS # BLD: 0.1 K/UL (ref 0–0.2)
BASOPHILS NFR BLD: 1 %
BILIRUB SERPL-MCNC: 0.7 MG/DL (ref 0–1)
BUN SERPL-MCNC: 48 MG/DL (ref 7–20)
CALCIUM SERPL-MCNC: 9 MG/DL (ref 8.3–10.6)
CHLORIDE SERPL-SCNC: 102 MMOL/L (ref 99–110)
CO2 SERPL-SCNC: 23 MMOL/L (ref 21–32)
CREAT SERPL-MCNC: 4.3 MG/DL (ref 0.8–1.3)
DEPRECATED RDW RBC AUTO: 18.2 % (ref 12.4–15.4)
EOSINOPHIL # BLD: 0.3 K/UL (ref 0–0.6)
EOSINOPHIL NFR BLD: 3.2 %
GFR SERPLBLD CREATININE-BSD FMLA CKD-EPI: 14 ML/MIN/{1.73_M2}
GLUCOSE BLD-MCNC: 114 MG/DL (ref 70–99)
GLUCOSE BLD-MCNC: 121 MG/DL (ref 70–99)
GLUCOSE BLD-MCNC: 92 MG/DL (ref 70–99)
GLUCOSE SERPL-MCNC: 97 MG/DL (ref 70–99)
HCT VFR BLD AUTO: 31.9 % (ref 40.5–52.5)
HGB BLD-MCNC: 10.5 G/DL (ref 13.5–17.5)
LYMPHOCYTES # BLD: 2.2 K/UL (ref 1–5.1)
LYMPHOCYTES NFR BLD: 24.8 %
MAGNESIUM SERPL-MCNC: 2.2 MG/DL (ref 1.8–2.4)
MCH RBC QN AUTO: 29.8 PG (ref 26–34)
MCHC RBC AUTO-ENTMCNC: 32.9 G/DL (ref 31–36)
MCV RBC AUTO: 90.5 FL (ref 80–100)
MONOCYTES # BLD: 1 K/UL (ref 0–1.3)
MONOCYTES NFR BLD: 10.8 %
NEUTROPHILS # BLD: 5.4 K/UL (ref 1.7–7.7)
NEUTROPHILS NFR BLD: 60.2 %
PERFORMED ON: ABNORMAL
PERFORMED ON: ABNORMAL
PERFORMED ON: NORMAL
PHOSPHATE SERPL-MCNC: 5.5 MG/DL (ref 2.5–4.9)
PLATELET # BLD AUTO: 357 K/UL (ref 135–450)
PMV BLD AUTO: 6.4 FL (ref 5–10.5)
POTASSIUM SERPL-SCNC: 4.8 MMOL/L (ref 3.5–5.1)
PROT SERPL-MCNC: 7.1 G/DL (ref 6.4–8.2)
RBC # BLD AUTO: 3.53 M/UL (ref 4.2–5.9)
SODIUM SERPL-SCNC: 138 MMOL/L (ref 136–145)
WBC # BLD AUTO: 9.1 K/UL (ref 4–11)

## 2023-08-29 PROCEDURE — 99232 SBSQ HOSP IP/OBS MODERATE 35: CPT | Performed by: INTERNAL MEDICINE

## 2023-08-29 PROCEDURE — 2580000003 HC RX 258: Performed by: STUDENT IN AN ORGANIZED HEALTH CARE EDUCATION/TRAINING PROGRAM

## 2023-08-29 PROCEDURE — 36415 COLL VENOUS BLD VENIPUNCTURE: CPT

## 2023-08-29 PROCEDURE — 97116 GAIT TRAINING THERAPY: CPT

## 2023-08-29 PROCEDURE — 97530 THERAPEUTIC ACTIVITIES: CPT

## 2023-08-29 PROCEDURE — 83735 ASSAY OF MAGNESIUM: CPT

## 2023-08-29 PROCEDURE — 85025 COMPLETE CBC W/AUTO DIFF WBC: CPT

## 2023-08-29 PROCEDURE — 6370000000 HC RX 637 (ALT 250 FOR IP): Performed by: STUDENT IN AN ORGANIZED HEALTH CARE EDUCATION/TRAINING PROGRAM

## 2023-08-29 PROCEDURE — 80053 COMPREHEN METABOLIC PANEL: CPT

## 2023-08-29 PROCEDURE — 6360000002 HC RX W HCPCS: Performed by: STUDENT IN AN ORGANIZED HEALTH CARE EDUCATION/TRAINING PROGRAM

## 2023-08-29 PROCEDURE — 6370000000 HC RX 637 (ALT 250 FOR IP): Performed by: PHYSICIAN ASSISTANT

## 2023-08-29 PROCEDURE — 84100 ASSAY OF PHOSPHORUS: CPT

## 2023-08-29 PROCEDURE — 97535 SELF CARE MNGMENT TRAINING: CPT

## 2023-08-29 RX ADMIN — LEVOTHYROXINE SODIUM 50 MCG: 50 TABLET ORAL at 06:31

## 2023-08-29 RX ADMIN — ASPIRIN 81 MG: 81 TABLET, COATED ORAL at 09:04

## 2023-08-29 RX ADMIN — HEPARIN SODIUM 5000 UNITS: 5000 INJECTION INTRAVENOUS; SUBCUTANEOUS at 06:32

## 2023-08-29 RX ADMIN — ATORVASTATIN CALCIUM 80 MG: 80 TABLET, FILM COATED ORAL at 09:04

## 2023-08-29 RX ADMIN — FINASTERIDE 5 MG: 5 TABLET, FILM COATED ORAL at 09:04

## 2023-08-29 RX ADMIN — LOSARTAN POTASSIUM 100 MG: 50 TABLET, FILM COATED ORAL at 09:05

## 2023-08-29 RX ADMIN — HEPARIN SODIUM 5000 UNITS: 5000 INJECTION INTRAVENOUS; SUBCUTANEOUS at 17:47

## 2023-08-29 RX ADMIN — SEVELAMER CARBONATE 800 MG: 800 TABLET, FILM COATED ORAL at 17:46

## 2023-08-29 RX ADMIN — SEVELAMER CARBONATE 800 MG: 800 TABLET, FILM COATED ORAL at 09:04

## 2023-08-29 RX ADMIN — SEVELAMER CARBONATE 800 MG: 800 TABLET, FILM COATED ORAL at 12:52

## 2023-08-29 RX ADMIN — METOPROLOL TARTRATE 25 MG: 25 TABLET, FILM COATED ORAL at 09:04

## 2023-08-29 RX ADMIN — PANTOPRAZOLE SODIUM 40 MG: 40 TABLET, DELAYED RELEASE ORAL at 06:31

## 2023-08-29 RX ADMIN — SODIUM CHLORIDE, PRESERVATIVE FREE 10 ML: 5 INJECTION INTRAVENOUS at 12:54

## 2023-08-29 RX ADMIN — AMLODIPINE BESYLATE 10 MG: 10 TABLET ORAL at 09:04

## 2023-08-29 RX ADMIN — METOPROLOL TARTRATE 25 MG: 25 TABLET, FILM COATED ORAL at 20:20

## 2023-08-29 RX ADMIN — Medication 5000 UNITS: at 09:04

## 2023-08-29 ASSESSMENT — PAIN SCALES - GENERAL: PAINLEVEL_OUTOF10: 0

## 2023-08-29 NOTE — PLAN OF CARE
Problem: Safety - Adult  Goal: Free from fall injury  Outcome: Progressing    Pt educated on use of call light. Pt verbalizes understanding.

## 2023-08-29 NOTE — CARE COORDINATION
CM following. CM met with pt's wife, Manuel Nascimento to update that [facility] ARU declined pt and recommended SNF. He reported being reluctant due to recent SNF experience but did provide alternate SNF referrals. She reported that she is able to transport pt to OP HD. CM sent SNF referrals and will see if they are open to wife transporting to OP HD.       [facility] (has onsite HD)    [facility] - can possibly accept if wife transports to HD; pt needs a chair schedule set up at [facility]; was doing home HD previously        Electronically signed by EDWAR Boyd on 8/28/2023 at 3:12 PM  [phone]
Case Management Assessment/ Note  Date/ Time of Note: 8/27/2023 11:51 AM  Note completed by: Fabian Martinez RN    If patient is discharged prior to next notation, then this note serves as note for discharge by case management. Patient Name: Eleni Lee  YOB: 1950    Diagnosis:ESRD (end stage renal disease) ([address]) [N18.6]  History of femur fracture [Z87.81]  End stage chronic kidney disease ([address]) [N18.6]  Patient Admission Status: Inpatient  [facility] [address]  8:14 PM    Length of Stay: 2 GLOS:   Readmission Risk Score: Readmission Risk Score: 18.1    ________________________________________________________________________________________  PT AM-PAC:   / 24 per last evaluation on: TBD    OT AM-PAC:   / 24 per last evaluation on: TBD    DME Needs for discharge: TBD  ________________________________________________________________________________________  Discharge Plan: To Be Determined DUE TO: Medical complexity; ARU consult pending  Pre-cert required for SNF: YES  COVID Result:    Lab Results   Component Value Date/Time    COVID19 Not Detected 03/02/2023 04:02 PM    COVID19 Not Detected 09/15/2020 02:36 PM       Transportation PLAN for discharge: family    Tentative discharge date: tbd    Potential assistance Purchasing Medications:    Does Patient want to participate in local refill/ meds to beds program?:      Current barriers to discharge: Medical complications    Referrals completed: Inpatient Rehab: ARU    ________________________________________________________________________________________  Case Management Notes: patient is from home with spouse. Patient was recently admitted to a rehab center but did not like the care there so he left. Patient agreeable to [facility] AR[facility], consult in, ARU aware. Awaiting PT/OT evaluations for review.     Patient admitted for ESRD (end stage renal disease) ([address]) [N18.6]  History of femur fracture [Z87.81]  End stage chronic kidney disease ([address])
Pt's new HD chair schedule will be MWF 3:15pm - 6:15pm.  CM updated Paco Johnson at [facility] and she will look into HD transport, as Luisa Schaefer is now reporting that she doesnot think she can transport pt by herself. Paco Johnson requested that CM start precert after she can confirm HD transport. CM also updated Luisa Schaefer with chair schedule. Electronically signed by EDWAR Hurd on 8/29/2023 at 1:41 PM  [phone]  _________________________________________________________________________________________________      CM following. Erinn Buenrostro at [facility] reported that they are not in network with [facility] but may be able to work something out with pt's secondary insurance, and will follow up with CM. CM called Emilyroxi Lyudmila at [facility] to request to set up new OP HD chair schedule. [facility] is following as they can accept pt and wife can transport to HD. Pt's wife, Luisa Schaefer met with CM again to discuss why pt is not appropriate for [facility], as pt is upset about not going. CM reached out to ARU liaison who will meet with them during their rounds to provide explanation/details.       Electronically signed by EDWAR Hurd on 8/29/2023 at 9:40 AM  [phone]
pt's RN of transport time. No other needs at this time. COVID Result:    Lab Results   Component Value Date/Time    COVID19 Not Detected 03/02/2023 04:02 PM    COVID19 Not Detected 09/15/2020 02:36 PM       The Plan for Transition of Care is related to the following treatment goals of ESRD (end stage renal disease) ([address]) [N18.6]  History of femur fracture [Z87.81]  End stage chronic kidney disease ([address]) [N18.6]    The Patient and/or patient representative Dae and his family were provided with a choice of provider and agrees with the discharge plan Yes    Freedom of choice list was provided with basic dialogue that supports the patient's individualized plan of care/goals and shares the quality data associated with the providers.  Yes    Care Transitions patient: No    RAYMOND Valentin, [facility]  Case Management Department  Ph: [phone]  Fax: [phone]

## 2023-08-30 NOTE — DISCHARGE SUMMARY
08/29/2023 08:28 AM     08/29/2023 08:28 AM       Renal:    Lab Results   Component Value Date/Time     08/29/2023 08:28 AM    K 4.8 08/29/2023 08:28 AM    K 5.2 08/25/2023 10:14 PM     08/29/2023 08:28 AM    CO2 23 08/29/2023 08:28 AM    BUN 48 08/29/2023 08:28 AM    CREATININE 4.3 08/29/2023 08:28 AM    CALCIUM 9.0 08/29/2023 08:28 AM    PHOS 5.5 08/29/2023 08:28 AM         Significant Diagnostic Studies    Radiology:   XR FEMUR RIGHT (MIN 2 VIEWS)   Final Result   Impression:    Status post fixation of displaced intertrochanteric proximal right femur fracture. No prior studies are available for comparison. Electronically signed by Mary Anne Hendrix MD      XR KNEE RIGHT (1-2 VIEWS)   Final Result   Impression:    No acute osseous injury.       Electronically signed by Mary Anne Hendrix MD             Consults:     IP CONSULT TO PRIMARY CARE PROVIDER  IP CONSULT TO NEPHROLOGY  IP CONSULT TO SOCIAL WORK  IP CONSULT TO PHYSICAL MEDICINE REHAB    Disposition: SNF    Condition at Discharge: Stable    Discharge Instructions/Follow-up:      PCP follow up in 2-3 weeks    Code Status:  Prior     Activity: activity as tolerated    Diet: renal diet      Discharge Medications:     Discharge Medication List as of 8/29/2023  7:51 PM             Details   amLODIPine (NORVASC) 2.5 MG tablet Take 1 tablet by mouth dailyHistorical Med      atorvastatin (LIPITOR) 80 MG tablet Take 1 tablet by mouth dailyHistorical Med      Cholecalciferol (VITAMIN D3) 125 MCG (5000 UT) TABS Take 1 tablet by mouth dailyHistorical Med      clopidogrel (PLAVIX) 75 MG tablet Take 1 tablet by mouth dailyHistorical Med      Dulaglutide (TRULICITY) 4.5 RH/7.3TU SOPN Inject 4.5 mg into the skin once a weekHistorical Med      finasteride (PROSCAR) 5 MG tablet Take 1 tablet by mouth dailyHistorical Med      Icosapent Ethyl (VASCEPA) 1 g CAPS capsule Take 1 capsule by mouth dailyHistorical Med      levothyroxine (SYNTHROID) 50 MCG tablet Take 1 tablet

## 2023-09-15 ENCOUNTER — HOSPITAL ENCOUNTER (EMERGENCY)
Age: 73
Discharge: HOME | End: 2023-09-15
Attending: EMERGENCY MEDICINE
Payer: MEDICARE

## 2023-09-15 ENCOUNTER — APPOINTMENT (OUTPATIENT)
Dept: GENERAL RADIOLOGY | Age: 73
End: 2023-09-15
Payer: MEDICARE

## 2023-09-15 VITALS
RESPIRATION RATE: 16 BRPM | DIASTOLIC BLOOD PRESSURE: 57 MMHG | HEIGHT: 69 IN | BODY MASS INDEX: 24.6 KG/M2 | SYSTOLIC BLOOD PRESSURE: 141 MMHG | TEMPERATURE: 97.5 F | WEIGHT: 166.1 LBS | HEART RATE: 56 BPM | OXYGEN SATURATION: 100 %

## 2023-09-15 DIAGNOSIS — R42 LIGHTHEADED: Primary | ICD-10-CM

## 2023-09-15 LAB
ANION GAP SERPL CALCULATED.3IONS-SCNC: 15 MMOL/L (ref 3–16)
ANION GAP SERPL CALCULATED.3IONS-SCNC: 17 MMOL/L (ref 3–16)
BASOPHILS # BLD: 0.1 K/UL (ref 0–0.2)
BASOPHILS NFR BLD: 0.5 %
BUN SERPL-MCNC: 50 MG/DL (ref 7–20)
BUN SERPL-MCNC: 57 MG/DL (ref 7–20)
CALCIUM SERPL-MCNC: 7.1 MG/DL (ref 8.3–10.6)
CALCIUM SERPL-MCNC: 8.7 MG/DL (ref 8.3–10.6)
CHLORIDE SERPL-SCNC: 100 MMOL/L (ref 99–110)
CHLORIDE SERPL-SCNC: 92 MMOL/L (ref 99–110)
CO2 SERPL-SCNC: 20 MMOL/L (ref 21–32)
CO2 SERPL-SCNC: 21 MMOL/L (ref 21–32)
CREAT SERPL-MCNC: 5.4 MG/DL (ref 0.8–1.3)
CREAT SERPL-MCNC: 6.1 MG/DL (ref 0.8–1.3)
DEPRECATED RDW RBC AUTO: 19.3 % (ref 12.4–15.4)
EKG ATRIAL RATE: 47 BPM
EKG DIAGNOSIS: NORMAL
EKG P AXIS: 43 DEGREES
EKG P-R INTERVAL: 188 MS
EKG Q-T INTERVAL: 520 MS
EKG QRS DURATION: 102 MS
EKG QTC CALCULATION (BAZETT): 460 MS
EKG R AXIS: 78 DEGREES
EKG T AXIS: -4 DEGREES
EKG VENTRICULAR RATE: 47 BPM
EOSINOPHIL # BLD: 0.1 K/UL (ref 0–0.6)
EOSINOPHIL NFR BLD: 1.3 %
GFR SERPLBLD CREATININE-BSD FMLA CKD-EPI: 10 ML/MIN/{1.73_M2}
GFR SERPLBLD CREATININE-BSD FMLA CKD-EPI: 9 ML/MIN/{1.73_M2}
GLUCOSE SERPL-MCNC: 108 MG/DL (ref 70–99)
GLUCOSE SERPL-MCNC: 119 MG/DL (ref 70–99)
HCT VFR BLD AUTO: 31.3 % (ref 40.5–52.5)
HGB BLD-MCNC: 10.4 G/DL (ref 13.5–17.5)
LYMPHOCYTES # BLD: 1.8 K/UL (ref 1–5.1)
LYMPHOCYTES NFR BLD: 18 %
MAGNESIUM SERPL-MCNC: 2.4 MG/DL (ref 1.8–2.4)
MCH RBC QN AUTO: 29.1 PG (ref 26–34)
MCHC RBC AUTO-ENTMCNC: 33.2 G/DL (ref 31–36)
MCV RBC AUTO: 87.7 FL (ref 80–100)
MONOCYTES # BLD: 1 K/UL (ref 0–1.3)
MONOCYTES NFR BLD: 10.3 %
NEUTROPHILS # BLD: 6.8 K/UL (ref 1.7–7.7)
NEUTROPHILS NFR BLD: 69.9 %
PHOSPHATE SERPL-MCNC: 5.7 MG/DL (ref 2.5–4.9)
PLATELET # BLD AUTO: 211 K/UL (ref 135–450)
PMV BLD AUTO: 7 FL (ref 5–10.5)
POTASSIUM SERPL-SCNC: 4.9 MMOL/L (ref 3.5–5.1)
POTASSIUM SERPL-SCNC: 5.7 MMOL/L (ref 3.5–5.1)
RBC # BLD AUTO: 3.57 M/UL (ref 4.2–5.9)
SODIUM SERPL-SCNC: 130 MMOL/L (ref 136–145)
SODIUM SERPL-SCNC: 135 MMOL/L (ref 136–145)
TROPONIN, HIGH SENSITIVITY: 46 NG/L (ref 0–22)
TROPONIN, HIGH SENSITIVITY: 54 NG/L (ref 0–22)
WBC # BLD AUTO: 9.8 K/UL (ref 4–11)

## 2023-09-15 PROCEDURE — 84484 ASSAY OF TROPONIN QUANT: CPT

## 2023-09-15 PROCEDURE — 2580000003 HC RX 258: Performed by: PHYSICIAN ASSISTANT

## 2023-09-15 PROCEDURE — 83735 ASSAY OF MAGNESIUM: CPT

## 2023-09-15 PROCEDURE — 84443 ASSAY THYROID STIM HORMONE: CPT

## 2023-09-15 PROCEDURE — 36415 COLL VENOUS BLD VENIPUNCTURE: CPT

## 2023-09-15 PROCEDURE — 85025 COMPLETE CBC W/AUTO DIFF WBC: CPT

## 2023-09-15 PROCEDURE — 84100 ASSAY OF PHOSPHORUS: CPT

## 2023-09-15 PROCEDURE — 93005 ELECTROCARDIOGRAM TRACING: CPT | Performed by: EMERGENCY MEDICINE

## 2023-09-15 PROCEDURE — 80048 BASIC METABOLIC PNL TOTAL CA: CPT

## 2023-09-15 PROCEDURE — 71046 X-RAY EXAM CHEST 2 VIEWS: CPT

## 2023-09-15 PROCEDURE — 99285 EMERGENCY DEPT VISIT HI MDM: CPT

## 2023-09-15 RX ORDER — SODIUM CHLORIDE, SODIUM LACTATE, POTASSIUM CHLORIDE, AND CALCIUM CHLORIDE .6; .31; .03; .02 G/100ML; G/100ML; G/100ML; G/100ML
500 INJECTION, SOLUTION INTRAVENOUS ONCE
Status: COMPLETED | OUTPATIENT
Start: 2023-09-15 | End: 2023-09-15

## 2023-09-15 RX ADMIN — SODIUM CHLORIDE, POTASSIUM CHLORIDE, SODIUM LACTATE AND CALCIUM CHLORIDE 500 ML: 600; 310; 30; 20 INJECTION, SOLUTION INTRAVENOUS at 13:37

## 2023-09-15 ASSESSMENT — ENCOUNTER SYMPTOMS: SHORTNESS OF BREATH: 0

## 2023-09-15 ASSESSMENT — PAIN SCALES - GENERAL: PAINLEVEL_OUTOF10: 0

## 2023-09-15 NOTE — DISCHARGE INSTRUCTIONS
ED Course     Today, you were seen in the Emergency Department. You have been diagnosed with:   1. Lightheaded      Disposition/Plan     IMPORTANT INSTRUCTIONS:  Please go to dialysis as previously scheduled. Your labs were largely reassuring. PER YOUR NEPHROLOGY TEAM:  Stop taking your amlodipine. Cut your losartan dose in half to 50 mg. If your heart rate is less than 60 bpm do not take your home metoprolol for that day. Transition slowly between sitting and standing. Follow-up with your primary care physician as soon as possible regarding your visit. Be sure to return to the Emergency Department immediately if symptoms worsen or new symptoms occur as we discussed, such as chest pain, difficulty breathing, passing out. PLEASE FOLLOW UP WITH:  Gilles Hyman  [address] [place] [id number] [address] [place]  [phone]    Schedule an appointment as soon as possible for a visit     Additional important information has been included with this packet, please make sure that you have read this information as it will better help you understand you illness/injury better.

## 2023-09-15 NOTE — ED PROVIDER NOTES
[facility] ENCOUNTER          PHYSICIAN ASSISTANT NOTE       Date of evaluation: 9/15/2023    Chief Complaint     Hypotension (Significant PMH/PSH, recent L1 compression fracture hip surgery, home PT found SBP to be in the 70's. 100's SBP currently, lightheaded, but A/Ox4.)      History of Present Illness     HPI: Shalini Beltran is a 74 y.o. male with history of hypertension, hypothyroidism, ESRD on dialysis, diabetes who presents to the emergency department with reported hypotension. Patient just got home last Friday after being at a skilled nursing facility after hip surgery. He has been working with outpatient physical therapy at his home. Physical therapy checked his blood pressure today and noticed that he was hypotensive with a systolic in the 62L. His wife states that he has had low blood pressures throughout the past week, has been working with a home health care nurse to try and talk to the patient's doctor about modifying his blood pressure medications. Patient notes that he has been feeling intermittently lightheaded though denies any current symptoms. He denies any chest pain or difficulty breathing. His last dialysis session was on Wednesday and was uncomplicated. He denies any leg swelling. He denies any abdominal pain, nausea or vomiting. With the exception of the above, there are no aggravating or alleviating factors. Review of Systems     Review of Systems   Constitutional:  Negative for chills and fever. Respiratory:  Negative for shortness of breath. Cardiovascular:  Positive for leg swelling. Negative for chest pain. As stated above, all other systems reviewed and are otherwise negative.      Past Medical, Surgical, Family, and Social History     He has a past medical history of Fainted, High cholesterol, Hypertension, Hypothyroid, Kidney disease, and Type II or unspecified type diabetes mellitus without mention of complication, not stated

## 2023-09-17 LAB — TSH SERPL DL<=0.005 MIU/L-ACNC: 2.36 UIU/ML (ref 0.27–4.2)

## 2024-02-12 ENCOUNTER — APPOINTMENT (OUTPATIENT)
Dept: GENERAL RADIOLOGY | Age: 74
DRG: 177 | End: 2024-02-12
Payer: MEDICARE

## 2024-02-12 ENCOUNTER — HOSPITAL ENCOUNTER (INPATIENT)
Age: 74
LOS: 2 days | Discharge: HOME | DRG: 177 | End: 2024-02-15
Attending: STUDENT IN AN ORGANIZED HEALTH CARE EDUCATION/TRAINING PROGRAM | Admitting: HOSPITALIST
Payer: MEDICARE

## 2024-02-12 DIAGNOSIS — U07.1 COVID: Primary | ICD-10-CM

## 2024-02-12 PROCEDURE — 85025 COMPLETE CBC W/AUTO DIFF WBC: CPT

## 2024-02-12 PROCEDURE — 83880 ASSAY OF NATRIURETIC PEPTIDE: CPT

## 2024-02-12 PROCEDURE — 83735 ASSAY OF MAGNESIUM: CPT

## 2024-02-12 PROCEDURE — 99285 EMERGENCY DEPT VISIT HI MDM: CPT

## 2024-02-12 PROCEDURE — 71046 X-RAY EXAM CHEST 2 VIEWS: CPT

## 2024-02-12 PROCEDURE — 86140 C-REACTIVE PROTEIN: CPT

## 2024-02-12 PROCEDURE — 84484 ASSAY OF TROPONIN QUANT: CPT

## 2024-02-12 PROCEDURE — 80048 BASIC METABOLIC PNL TOTAL CA: CPT

## 2024-02-12 PROCEDURE — 87636 SARSCOV2 & INF A&B AMP PRB: CPT

## 2024-02-12 PROCEDURE — 93005 ELECTROCARDIOGRAM TRACING: CPT

## 2024-02-12 PROCEDURE — 36415 COLL VENOUS BLD VENIPUNCTURE: CPT

## 2024-02-12 ASSESSMENT — PAIN - FUNCTIONAL ASSESSMENT: PAIN_FUNCTIONAL_ASSESSMENT: NONE - DENIES PAIN

## 2024-02-13 PROBLEM — U07.1 COVID-19: Status: ACTIVE | Noted: 2024-02-13

## 2024-02-13 LAB
ALBUMIN SERPL-MCNC: 2.9 G/DL (ref 3.4–5)
ALBUMIN SERPL-MCNC: 3.9 G/DL (ref 3.4–5)
ALP SERPL-CCNC: 90 U/L (ref 40–129)
ALT SERPL-CCNC: 64 U/L (ref 10–40)
ANION GAP SERPL CALCULATED.3IONS-SCNC: 10 MMOL/L (ref 3–16)
ANION GAP SERPL CALCULATED.3IONS-SCNC: 13 MMOL/L (ref 3–16)
ANTI-XA UNFRAC HEPARIN: <0.1 IU/ML (ref 0.3–0.7)
APTT BLD: 50 SEC (ref 22.7–35.9)
AST SERPL-CCNC: 53 U/L (ref 15–37)
BASOPHILS # BLD: 0 K/UL (ref 0–0.2)
BASOPHILS NFR BLD: 0.2 %
BILIRUB DIRECT SERPL-MCNC: 0.3 MG/DL (ref 0–0.3)
BILIRUB INDIRECT SERPL-MCNC: 0.6 MG/DL (ref 0–1)
BILIRUB SERPL-MCNC: 0.9 MG/DL (ref 0–1)
BUN SERPL-MCNC: 44 MG/DL (ref 7–20)
BUN SERPL-MCNC: 45 MG/DL (ref 7–20)
CALCIUM SERPL-MCNC: 7.7 MG/DL (ref 8.3–10.6)
CALCIUM SERPL-MCNC: 8.6 MG/DL (ref 8.3–10.6)
CHLORIDE SERPL-SCNC: 89 MMOL/L (ref 99–110)
CHLORIDE SERPL-SCNC: 94 MMOL/L (ref 99–110)
CO2 SERPL-SCNC: 27 MMOL/L (ref 21–32)
CO2 SERPL-SCNC: 31 MMOL/L (ref 21–32)
CREAT SERPL-MCNC: 3.5 MG/DL (ref 0.8–1.3)
CREAT SERPL-MCNC: 3.7 MG/DL (ref 0.8–1.3)
CRP SERPL-MCNC: 164.9 MG/L (ref 0–5.1)
D DIMER: 1.45 UG/ML FEU (ref 0–0.6)
DEPRECATED RDW RBC AUTO: 15.2 % (ref 12.4–15.4)
EOSINOPHIL # BLD: 0.1 K/UL (ref 0–0.6)
EOSINOPHIL NFR BLD: 0.5 %
FERRITIN SERPL IA-MCNC: 4000 NG/ML (ref 30–400)
FLUAV RNA RESP QL NAA+PROBE: NOT DETECTED
FLUBV RNA RESP QL NAA+PROBE: NOT DETECTED
GFR SERPLBLD CREATININE-BSD FMLA CKD-EPI: 16 ML/MIN/{1.73_M2}
GFR SERPLBLD CREATININE-BSD FMLA CKD-EPI: 17 ML/MIN/{1.73_M2}
GLUCOSE BLD-MCNC: 142 MG/DL (ref 70–99)
GLUCOSE BLD-MCNC: 204 MG/DL (ref 70–99)
GLUCOSE BLD-MCNC: 214 MG/DL (ref 70–99)
GLUCOSE BLD-MCNC: 274 MG/DL (ref 70–99)
GLUCOSE SERPL-MCNC: 110 MG/DL (ref 70–99)
GLUCOSE SERPL-MCNC: 128 MG/DL (ref 70–99)
HCT VFR BLD AUTO: 25.3 % (ref 40.5–52.5)
HCT VFR BLD AUTO: 29.5 % (ref 40.5–52.5)
HGB BLD-MCNC: 8.8 G/DL (ref 13.5–17.5)
HGB BLD-MCNC: 9.5 G/DL (ref 13.5–17.5)
INR PPP: 1.22 (ref 0.84–1.16)
LDH SERPL L TO P-CCNC: 282 U/L (ref 100–190)
LYMPHOCYTES # BLD: 0.6 K/UL (ref 1–5.1)
LYMPHOCYTES NFR BLD: 4.5 %
MAGNESIUM SERPL-MCNC: 1.7 MG/DL (ref 1.8–2.4)
MAGNESIUM SERPL-MCNC: 1.8 MG/DL (ref 1.8–2.4)
MCH RBC QN AUTO: 30.3 PG (ref 26–34)
MCHC RBC AUTO-ENTMCNC: 35 G/DL (ref 31–36)
MCV RBC AUTO: 86.5 FL (ref 80–100)
MONOCYTES # BLD: 0.9 K/UL (ref 0–1.3)
MONOCYTES NFR BLD: 7 %
NEUTROPHILS # BLD: 11.5 K/UL (ref 1.7–7.7)
NEUTROPHILS NFR BLD: 87.8 %
NT-PROBNP SERPL-MCNC: ABNORMAL PG/ML (ref 0–449)
OSMOLALITY SERPL: 299 MOSM/KG (ref 278–305)
PERFORMED ON: ABNORMAL
PHOSPHATE SERPL-MCNC: 2.7 MG/DL (ref 2.5–4.9)
PLATELET # BLD AUTO: 294 K/UL (ref 135–450)
PMV BLD AUTO: 6.9 FL (ref 5–10.5)
POTASSIUM SERPL-SCNC: 3 MMOL/L (ref 3.5–5.1)
POTASSIUM SERPL-SCNC: 3.1 MMOL/L (ref 3.5–5.1)
PROCALCITONIN SERPL IA-MCNC: 2.07 NG/ML (ref 0–0.15)
PROT SERPL-MCNC: 7.8 G/DL (ref 6.4–8.2)
PROTHROMBIN TIME: 15.4 SEC (ref 11.5–14.8)
RBC # BLD AUTO: 2.92 M/UL (ref 4.2–5.9)
REASON FOR REJECTION: NORMAL
REJECTED TEST: NORMAL
SARS-COV-2 RNA RESP QL NAA+PROBE: DETECTED
SODIUM SERPL-SCNC: 130 MMOL/L (ref 136–145)
SODIUM SERPL-SCNC: 134 MMOL/L (ref 136–145)
TROPONIN, HIGH SENSITIVITY: 92 NG/L (ref 0–22)
TROPONIN, HIGH SENSITIVITY: 94 NG/L (ref 0–22)
WBC # BLD AUTO: 13.1 K/UL (ref 4–11)

## 2024-02-13 PROCEDURE — 3E0333Z INTRODUCTION OF ANTI-INFLAMMATORY INTO PERIPHERAL VEIN, PERCUTANEOUS APPROACH: ICD-10-PCS | Performed by: INTERNAL MEDICINE

## 2024-02-13 PROCEDURE — 97535 SELF CARE MNGMENT TRAINING: CPT

## 2024-02-13 PROCEDURE — 97530 THERAPEUTIC ACTIVITIES: CPT

## 2024-02-13 PROCEDURE — 80076 HEPATIC FUNCTION PANEL: CPT

## 2024-02-13 PROCEDURE — 83735 ASSAY OF MAGNESIUM: CPT

## 2024-02-13 PROCEDURE — 6360000002 HC RX W HCPCS

## 2024-02-13 PROCEDURE — 85014 HEMATOCRIT: CPT

## 2024-02-13 PROCEDURE — 85018 HEMOGLOBIN: CPT

## 2024-02-13 PROCEDURE — 2580000003 HC RX 258

## 2024-02-13 PROCEDURE — 83930 ASSAY OF BLOOD OSMOLALITY: CPT

## 2024-02-13 PROCEDURE — 87040 BLOOD CULTURE FOR BACTERIA: CPT

## 2024-02-13 PROCEDURE — 85730 THROMBOPLASTIN TIME PARTIAL: CPT

## 2024-02-13 PROCEDURE — 6370000000 HC RX 637 (ALT 250 FOR IP)

## 2024-02-13 PROCEDURE — 97166 OT EVAL MOD COMPLEX 45 MIN: CPT

## 2024-02-13 PROCEDURE — 83615 LACTATE (LD) (LDH) ENZYME: CPT

## 2024-02-13 PROCEDURE — 85610 PROTHROMBIN TIME: CPT

## 2024-02-13 PROCEDURE — 84145 PROCALCITONIN (PCT): CPT

## 2024-02-13 PROCEDURE — 85520 HEPARIN ASSAY: CPT

## 2024-02-13 PROCEDURE — 84484 ASSAY OF TROPONIN QUANT: CPT

## 2024-02-13 PROCEDURE — 1200000000 HC SEMI PRIVATE

## 2024-02-13 PROCEDURE — 85379 FIBRIN DEGRADATION QUANT: CPT

## 2024-02-13 PROCEDURE — 36415 COLL VENOUS BLD VENIPUNCTURE: CPT

## 2024-02-13 PROCEDURE — 97116 GAIT TRAINING THERAPY: CPT

## 2024-02-13 PROCEDURE — 82728 ASSAY OF FERRITIN: CPT

## 2024-02-13 PROCEDURE — XW0DXM6 INTRODUCTION OF BARICITINIB INTO MOUTH AND PHARYNX, EXTERNAL APPROACH, NEW TECHNOLOGY GROUP 6: ICD-10-PCS | Performed by: INTERNAL MEDICINE

## 2024-02-13 PROCEDURE — 97162 PT EVAL MOD COMPLEX 30 MIN: CPT

## 2024-02-13 PROCEDURE — 80069 RENAL FUNCTION PANEL: CPT

## 2024-02-13 PROCEDURE — 99223 1ST HOSP IP/OBS HIGH 75: CPT | Performed by: INTERNAL MEDICINE

## 2024-02-13 RX ORDER — HEPARIN SODIUM 1000 [USP'U]/ML
80 INJECTION, SOLUTION INTRAVENOUS; SUBCUTANEOUS PRN
Status: DISCONTINUED | OUTPATIENT
Start: 2024-02-13 | End: 2024-02-13

## 2024-02-13 RX ORDER — TAMSULOSIN HYDROCHLORIDE 0.4 MG/1
CAPSULE ORAL
COMMUNITY
Start: 2024-01-19

## 2024-02-13 RX ORDER — ATORVASTATIN CALCIUM 80 MG/1
80 TABLET, FILM COATED ORAL DAILY
Status: DISCONTINUED | OUTPATIENT
Start: 2024-02-13 | End: 2024-02-15 | Stop reason: HOSPADM

## 2024-02-13 RX ORDER — ACETAMINOPHEN 650 MG/1
650 SUPPOSITORY RECTAL EVERY 6 HOURS PRN
Status: DISCONTINUED | OUTPATIENT
Start: 2024-02-13 | End: 2024-02-15 | Stop reason: HOSPADM

## 2024-02-13 RX ORDER — HEPARIN SODIUM 1000 [USP'U]/ML
80 INJECTION, SOLUTION INTRAVENOUS; SUBCUTANEOUS ONCE
Status: DISCONTINUED | OUTPATIENT
Start: 2024-02-13 | End: 2024-02-13

## 2024-02-13 RX ORDER — INSULIN LISPRO 100 [IU]/ML
0-4 INJECTION, SOLUTION INTRAVENOUS; SUBCUTANEOUS
Status: DISCONTINUED | OUTPATIENT
Start: 2024-02-13 | End: 2024-02-15 | Stop reason: HOSPADM

## 2024-02-13 RX ORDER — EZETIMIBE 10 MG/1
10 TABLET ORAL EVERY MORNING
COMMUNITY
Start: 2023-11-16

## 2024-02-13 RX ORDER — CLOPIDOGREL BISULFATE 75 MG/1
75 TABLET ORAL DAILY
Status: CANCELLED | OUTPATIENT
Start: 2024-02-13

## 2024-02-13 RX ORDER — RANOLAZINE 500 MG/1
500 TABLET, EXTENDED RELEASE ORAL 2 TIMES DAILY
COMMUNITY
Start: 2024-01-15

## 2024-02-13 RX ORDER — AMLODIPINE BESYLATE 5 MG/1
5 TABLET ORAL 2 TIMES DAILY
COMMUNITY
Start: 2024-01-19

## 2024-02-13 RX ORDER — POLYETHYLENE GLYCOL 3350 17 G/17G
17 POWDER, FOR SOLUTION ORAL DAILY PRN
Status: DISCONTINUED | OUTPATIENT
Start: 2024-02-13 | End: 2024-02-15 | Stop reason: HOSPADM

## 2024-02-13 RX ORDER — OMEGA-3-ACID ETHYL ESTERS 1 G/1
1 CAPSULE, LIQUID FILLED ORAL DAILY
Status: DISCONTINUED | OUTPATIENT
Start: 2024-02-13 | End: 2024-02-15 | Stop reason: HOSPADM

## 2024-02-13 RX ORDER — MAGNESIUM SULFATE IN WATER 40 MG/ML
4000 INJECTION, SOLUTION INTRAVENOUS ONCE
Status: DISCONTINUED | OUTPATIENT
Start: 2024-02-13 | End: 2024-02-13

## 2024-02-13 RX ORDER — LOSARTAN POTASSIUM 100 MG/1
100 TABLET ORAL DAILY
Status: DISCONTINUED | OUTPATIENT
Start: 2024-02-13 | End: 2024-02-15 | Stop reason: HOSPADM

## 2024-02-13 RX ORDER — SEVELAMER CARBONATE 800 MG/1
800 TABLET, FILM COATED ORAL
Status: DISCONTINUED | OUTPATIENT
Start: 2024-02-13 | End: 2024-02-15 | Stop reason: HOSPADM

## 2024-02-13 RX ORDER — HEPARIN SODIUM 5000 [USP'U]/ML
5000 INJECTION, SOLUTION INTRAVENOUS; SUBCUTANEOUS EVERY 8 HOURS SCHEDULED
Status: DISCONTINUED | OUTPATIENT
Start: 2024-02-13 | End: 2024-02-13

## 2024-02-13 RX ORDER — ASPIRIN 81 MG/1
81 TABLET ORAL DAILY
Status: DISCONTINUED | OUTPATIENT
Start: 2024-02-13 | End: 2024-02-15 | Stop reason: HOSPADM

## 2024-02-13 RX ORDER — FUROSEMIDE 40 MG/1
40 TABLET ORAL 2 TIMES DAILY
COMMUNITY
Start: 2024-01-21

## 2024-02-13 RX ORDER — ROSUVASTATIN CALCIUM 20 MG/1
20 TABLET, COATED ORAL NIGHTLY
COMMUNITY
Start: 2024-01-15

## 2024-02-13 RX ORDER — AMLODIPINE BESYLATE 2.5 MG/1
2.5 TABLET ORAL DAILY
Status: DISCONTINUED | OUTPATIENT
Start: 2024-02-13 | End: 2024-02-15 | Stop reason: HOSPADM

## 2024-02-13 RX ORDER — INSULIN LISPRO 100 [IU]/ML
0-4 INJECTION, SOLUTION INTRAVENOUS; SUBCUTANEOUS NIGHTLY
Status: DISCONTINUED | OUTPATIENT
Start: 2024-02-13 | End: 2024-02-15 | Stop reason: HOSPADM

## 2024-02-13 RX ORDER — POTASSIUM CHLORIDE 20 MEQ/1
40 TABLET, EXTENDED RELEASE ORAL 2 TIMES DAILY WITH MEALS
Status: DISCONTINUED | OUTPATIENT
Start: 2024-02-13 | End: 2024-02-13

## 2024-02-13 RX ORDER — DEXTROSE MONOHYDRATE 100 MG/ML
INJECTION, SOLUTION INTRAVENOUS CONTINUOUS PRN
Status: DISCONTINUED | OUTPATIENT
Start: 2024-02-13 | End: 2024-02-15 | Stop reason: HOSPADM

## 2024-02-13 RX ORDER — FINASTERIDE 5 MG/1
5 TABLET, FILM COATED ORAL DAILY
Status: DISCONTINUED | OUTPATIENT
Start: 2024-02-13 | End: 2024-02-15 | Stop reason: HOSPADM

## 2024-02-13 RX ORDER — MAGNESIUM SULFATE IN WATER 40 MG/ML
2000 INJECTION, SOLUTION INTRAVENOUS ONCE
Status: COMPLETED | OUTPATIENT
Start: 2024-02-13 | End: 2024-02-13

## 2024-02-13 RX ORDER — ISOSORBIDE DINITRATE 20 MG/1
20 TABLET ORAL 2 TIMES DAILY
COMMUNITY
Start: 2024-01-15

## 2024-02-13 RX ORDER — INSULIN GLARGINE 100 [IU]/ML
5 INJECTION, SOLUTION SUBCUTANEOUS NIGHTLY
Status: DISCONTINUED | OUTPATIENT
Start: 2024-02-13 | End: 2024-02-15 | Stop reason: HOSPADM

## 2024-02-13 RX ORDER — GLUCAGON 1 MG/ML
1 KIT INJECTION PRN
Status: DISCONTINUED | OUTPATIENT
Start: 2024-02-13 | End: 2024-02-15 | Stop reason: HOSPADM

## 2024-02-13 RX ORDER — ONDANSETRON 2 MG/ML
4 INJECTION INTRAMUSCULAR; INTRAVENOUS EVERY 6 HOURS PRN
Status: DISCONTINUED | OUTPATIENT
Start: 2024-02-13 | End: 2024-02-15 | Stop reason: HOSPADM

## 2024-02-13 RX ORDER — SODIUM CHLORIDE 0.9 % (FLUSH) 0.9 %
5-40 SYRINGE (ML) INJECTION EVERY 12 HOURS SCHEDULED
Status: DISCONTINUED | OUTPATIENT
Start: 2024-02-13 | End: 2024-02-15 | Stop reason: HOSPADM

## 2024-02-13 RX ORDER — PANTOPRAZOLE SODIUM 40 MG/1
40 TABLET, DELAYED RELEASE ORAL
Status: DISCONTINUED | OUTPATIENT
Start: 2024-02-14 | End: 2024-02-15 | Stop reason: HOSPADM

## 2024-02-13 RX ORDER — SODIUM CHLORIDE 9 MG/ML
INJECTION, SOLUTION INTRAVENOUS PRN
Status: DISCONTINUED | OUTPATIENT
Start: 2024-02-13 | End: 2024-02-15 | Stop reason: HOSPADM

## 2024-02-13 RX ORDER — ACETAMINOPHEN 325 MG/1
650 TABLET ORAL EVERY 6 HOURS PRN
Status: DISCONTINUED | OUTPATIENT
Start: 2024-02-13 | End: 2024-02-15 | Stop reason: HOSPADM

## 2024-02-13 RX ORDER — ONDANSETRON 4 MG/1
4 TABLET, ORALLY DISINTEGRATING ORAL EVERY 8 HOURS PRN
Status: DISCONTINUED | OUTPATIENT
Start: 2024-02-13 | End: 2024-02-15 | Stop reason: HOSPADM

## 2024-02-13 RX ORDER — HEPARIN SODIUM 10000 [USP'U]/100ML
5-30 INJECTION, SOLUTION INTRAVENOUS CONTINUOUS
Status: DISCONTINUED | OUTPATIENT
Start: 2024-02-13 | End: 2024-02-13

## 2024-02-13 RX ORDER — LEVOTHYROXINE SODIUM 0.05 MG/1
50 TABLET ORAL DAILY
Status: DISCONTINUED | OUTPATIENT
Start: 2024-02-13 | End: 2024-02-15 | Stop reason: HOSPADM

## 2024-02-13 RX ORDER — HEPARIN SODIUM 5000 [USP'U]/ML
5000 INJECTION, SOLUTION INTRAVENOUS; SUBCUTANEOUS EVERY 8 HOURS SCHEDULED
Status: DISCONTINUED | OUTPATIENT
Start: 2024-02-13 | End: 2024-02-15 | Stop reason: HOSPADM

## 2024-02-13 RX ORDER — FOLIC ACID/VIT B COMPLEX AND C 0.8 MG
1 TABLET ORAL DAILY
COMMUNITY
Start: 2024-02-09

## 2024-02-13 RX ORDER — DEXAMETHASONE SODIUM PHOSPHATE 10 MG/ML
6 INJECTION, SOLUTION INTRAMUSCULAR; INTRAVENOUS EVERY 24 HOURS
Status: DISCONTINUED | OUTPATIENT
Start: 2024-02-13 | End: 2024-02-15 | Stop reason: HOSPADM

## 2024-02-13 RX ORDER — OLANZAPINE 2.5 MG/1
2.5 TABLET, FILM COATED ORAL NIGHTLY
COMMUNITY
Start: 2024-01-15

## 2024-02-13 RX ORDER — SODIUM CHLORIDE FOR INHALATION 3 %
4 VIAL, NEBULIZER (ML) INHALATION PRN
Status: DISCONTINUED | OUTPATIENT
Start: 2024-02-13 | End: 2024-02-15 | Stop reason: HOSPADM

## 2024-02-13 RX ORDER — HEPARIN SODIUM 1000 [USP'U]/ML
40 INJECTION, SOLUTION INTRAVENOUS; SUBCUTANEOUS PRN
Status: DISCONTINUED | OUTPATIENT
Start: 2024-02-13 | End: 2024-02-13

## 2024-02-13 RX ORDER — SODIUM CHLORIDE 0.9 % (FLUSH) 0.9 %
5-40 SYRINGE (ML) INJECTION PRN
Status: DISCONTINUED | OUTPATIENT
Start: 2024-02-13 | End: 2024-02-15 | Stop reason: HOSPADM

## 2024-02-13 RX ORDER — GUAIFENESIN 600 MG/1
600 TABLET, EXTENDED RELEASE ORAL 2 TIMES DAILY
Status: DISCONTINUED | OUTPATIENT
Start: 2024-02-13 | End: 2024-02-15 | Stop reason: HOSPADM

## 2024-02-13 RX ADMIN — FINASTERIDE 5 MG: 5 TABLET, FILM COATED ORAL at 08:37

## 2024-02-13 RX ADMIN — OMEGA-3-ACID ETHYL ESTERS 1 G: 1 CAPSULE, LIQUID FILLED ORAL at 08:52

## 2024-02-13 RX ADMIN — METOPROLOL TARTRATE 25 MG: 25 TABLET, FILM COATED ORAL at 08:38

## 2024-02-13 RX ADMIN — CEFTRIAXONE 1000 MG: 1 INJECTION, POWDER, FOR SOLUTION INTRAMUSCULAR; INTRAVENOUS at 14:03

## 2024-02-13 RX ADMIN — ATORVASTATIN CALCIUM 80 MG: 80 TABLET, FILM COATED ORAL at 08:38

## 2024-02-13 RX ADMIN — INSULIN LISPRO 2 UNITS: 100 INJECTION, SOLUTION INTRAVENOUS; SUBCUTANEOUS at 17:22

## 2024-02-13 RX ADMIN — SEVELAMER CARBONATE 800 MG: 800 TABLET, FILM COATED ORAL at 08:40

## 2024-02-13 RX ADMIN — AMLODIPINE BESYLATE 2.5 MG: 2.5 TABLET ORAL at 08:37

## 2024-02-13 RX ADMIN — SODIUM CHLORIDE, PRESERVATIVE FREE 10 ML: 5 INJECTION INTRAVENOUS at 22:18

## 2024-02-13 RX ADMIN — LOSARTAN POTASSIUM 100 MG: 100 TABLET, FILM COATED ORAL at 08:38

## 2024-02-13 RX ADMIN — AZITHROMYCIN MONOHYDRATE 500 MG: 500 INJECTION, POWDER, LYOPHILIZED, FOR SOLUTION INTRAVENOUS at 14:48

## 2024-02-13 RX ADMIN — METOPROLOL TARTRATE 25 MG: 25 TABLET, FILM COATED ORAL at 22:17

## 2024-02-13 RX ADMIN — LEVOTHYROXINE SODIUM 50 MCG: 50 TABLET ORAL at 05:12

## 2024-02-13 RX ADMIN — BARICITINIB 1 MG: 2 TABLET, FILM COATED ORAL at 08:52

## 2024-02-13 RX ADMIN — GUAIFENESIN 600 MG: 600 TABLET ORAL at 22:18

## 2024-02-13 RX ADMIN — POTASSIUM CHLORIDE 40 MEQ: 1500 TABLET, EXTENDED RELEASE ORAL at 08:38

## 2024-02-13 RX ADMIN — ASPIRIN 81 MG: 81 TABLET, COATED ORAL at 08:38

## 2024-02-13 RX ADMIN — MAGNESIUM SULFATE HEPTAHYDRATE 2000 MG: 40 INJECTION, SOLUTION INTRAVENOUS at 07:05

## 2024-02-13 RX ADMIN — HEPARIN SODIUM 5000 UNITS: 5000 INJECTION INTRAVENOUS; SUBCUTANEOUS at 22:18

## 2024-02-13 RX ADMIN — INSULIN GLARGINE 5 UNITS: 100 INJECTION, SOLUTION SUBCUTANEOUS at 22:18

## 2024-02-13 RX ADMIN — HEPARIN SODIUM 5000 UNITS: 5000 INJECTION INTRAVENOUS; SUBCUTANEOUS at 12:32

## 2024-02-13 RX ADMIN — HEPARIN SODIUM 5000 UNITS: 5000 INJECTION INTRAVENOUS; SUBCUTANEOUS at 05:11

## 2024-02-13 RX ADMIN — INSULIN LISPRO 1 UNITS: 100 INJECTION, SOLUTION INTRAVENOUS; SUBCUTANEOUS at 12:33

## 2024-02-13 RX ADMIN — DEXAMETHASONE SODIUM PHOSPHATE 6 MG: 10 INJECTION INTRAMUSCULAR; INTRAVENOUS at 05:23

## 2024-02-13 NOTE — FLOWSHEET NOTE
Patient to room from from ED, alert and oriented X 4, denies shortness of breath, on O2 at 2LPM saturating at 94%, no chest pain. Physical assessment performed, safety precautions in place, oriented to room and staff. Discussed plan of care with patient, belongings secured in patient belongings bag ( pants, shirt), patient have bracelet on right wrist. Will continue to monitor.       02/13/24 0432   Vitals   Temp 97.9 °F (36.6 °C)   Temp Source Oral   Pulse 79   Respirations 18   BP (!) 156/65   MAP (Calculated) 95   BP Location Left upper arm   BP Upper/Lower Upper   BP Method Automatic   Patient Position Semi fowlers   Cardiac Rhythm Sinus rhythm   Pain Assessment   Pain Assessment None - Denies Pain   Oxygen Therapy   SpO2 94 %   O2 Device Nasal cannula   O2 Flow Rate (L/min) 2 L/min

## 2024-02-13 NOTE — CARE COORDINATION
Case Management Assessment  Initial Evaluation    Date/Time of Evaluation: 2/13/2024 4:04 PM  Assessment Completed by: Julien Epps RN    If patient is discharged prior to next notation, then this note serves as note for discharge by case management.    Patient Name: Dae Girard                   YOB: 1950  Diagnosis: COVID [U07.1]  COVID-19 [U07.1]                   Date / Time: 2/12/2024 10:32 PM    Patient Admission Status: Inpatient   Readmission Risk (Low < 19, Mod (19-27), High > 27): Readmission Risk Score: 20.6    Current PCP: Maik Godwin  PCP verified by CM? Yes    Chart Reviewed: Yes      History Provided by: Patient, Medical Record  Patient Orientation: Alert and Oriented    Patient Cognition: Alert    Hospitalization in the last 30 days (Readmission):  No    If yes, Readmission Assessment in CM Navigator will be completed and shown below.          Advance Directives:      Code Status: Full Code   Patient's Primary Decision Maker is: Legal Next of Kin    Primary Decision Maker: Jessica Girard P - [phone]    Discharge Planning:    Patient lives with: Spouse/Significant Other Type of Home: House  Primary Care Giver: Self  Patient Support Systems include: Family Members   Current Financial resources: Medicare  Current community resources:    Current services prior to admission: None            Current DME:              Type of Home Care services:  None    ADLS  Prior functional level: Independent in ADLs/IADLs  Current functional level: Independent in ADLs/IADLs    PT AM-PAC: 18 /24  OT AM-PAC: 20 /24    Family can provide assistance at DC: Yes  Would you like Case Management to discuss the discharge plan with any other family members/significant others, and if so, who? Yes  Plans to Return to Present Housing: Yes  Other Identified Issues/Barriers to RETURNING to current housing: medical complications  Potential Assistance needed at discharge: N/A            Potential DME:

## 2024-02-13 NOTE — PROGRESS NOTES
chair, Hand-held shower  Home Equipment: Wheelchair-manual, Walker, rolling, Cane (3 walkers for each level of the house)  Has the patient had two or more falls in the past year or any fall with injury in the past year?: Yes (1 fall about a few weeks ago- tirpped over feet)  ADL Assistance: Independent  Homemaking Assistance: Independent (shares with family)  Ambulation Assistance: Independent (takes walker outside of home, uses scooter at grocery store)  Transfer Assistance: Independent  Active : No (wife drives)  Occupation: Retired ([employer]/)  Additional Comments: wife does pt's dialysis at home    Vision/Hearing  Vision: Within Functional Limits (\"sometimes\" wears glasses- currently at home)  Hearing: Within functional limits (B hearing aids)      Cognition   Within Functional Limits     Objective   Gross Assessment  AROM: Within functional limits  Strength: Within functional limits     Bed mobility  Supine to Sit: Stand by assistance (HOB raised, incerased time to complete task)    Transfers  Sit to Stand: Stand by assistance  Stand to Sit: Stand by assistance    Ambulation  Device: No Device  Other Apparatus: O2 (2L)  Assistance: Contact guard assistance  Quality of Gait: guarded gait  Gait Deviations: Decreased arm swing;Decreased step length;Decreased step height  Distance: 10ft + 25ft  Comments: Pt refused to try rolling walker.    Balance  Sitting - Static: Good  Sitting - Dynamic: Good  Standing - Static: Fair  Standing - Dynamic: Fair (CGA with ambulation)        AM-PAC - Mobility  AM-PAC Basic Mobility - Inpatient   How much help is needed turning from your back to your side while in a flat bed without using bedrails?: A Little  How much help is needed moving from lying on your back to sitting on the side of a flat bed without using bedrails?: A Little  How much help is needed moving to and from a bed to a chair?: A Little  How much help is needed standing up from a chair  using your arms?: A Little  How much help is needed walking in hospital room?: A Little  How much help is needed climbing 3-5 steps with a railing?: A Little  AM-PAC Inpatient Mobility Raw Score : 18  AM-PAC Inpatient T-Scale Score : 43.63  Mobility Inpatient CMS 0-100% Score: 46.58  Mobility Inpatient CMS G-Code Modifier : CK      Goals  Short Term Goals  Time Frame for Short Term Goals: Discharge  Short Term Goal 1: supine <> sit independent  Short Term Goal 2: sit <> stand supervision  Short Term Goal 3: ambulate 100ft with/without assistive device supervision  Patient Goals   Patient Goals : Return home       Education  Patient Education  Education Given To: Patient  Education Provided: Role of Therapy;Plan of Care;Fall Prevention Strategies  Education Method: Verbal  Education Outcome: Verbalized understanding      Therapy Time   Individual Concurrent Group Co-treatment   Time In 0910         Time Out 0948         Minutes 38         Timed Code Treatment Minutes:  25  Total Treatment Minutes:  38      Carolina Lovell, PT

## 2024-02-13 NOTE — PROGRESS NOTES
Perfect Served by nurse. Apparently, patient had been having lower abdominal pain and then passed blood/blood clots in his urine. Anticoagulation held. Asked the lab to expedite his morning labs so we could get a hemoglobin STAT rather than put in a STAT H+H and have them go back and draw a CBC+other 90 minutes later. They complied.

## 2024-02-13 NOTE — CONSULTS
Clinical Pharmacy Consult Note  Medication History     Admit Date: 2/12/2024    Pharmacy consulted to verify home medication list by Dr. Rolle.    List of of current medications patient is taking is complete. Home Medication list in EPIC updated to reflect changes noted below.    Source of information: Rx dispense history (Surescripts), [facility]    Changes made to medication list:   Medications removed: (include reason, ex: therapy completed, patient no longer taking, etc.)  Clopidogrel - pt reported as not taking; per outpatient cardiology notes, this was stopped in July 2023 (changed antiplatelet therapy to ASA alone)  Lantus - pt reports not taking; last filled 2017 per fill history  Atorvastatin - pt takes rosuvastatin  Stress formula vitamin - pt reported as not taking  EMLA cream - pt reports not taking  MVI - pt reports not taking on admit  Medications added:   Rosuvastatin - last filled Jan 2024  Olanzapine 2.5mg po nightly; last filled 1/15/24 x 90d  Flomax - last filled 1/19/24 x 90d   Zetia - last filled 11/16/24 x 90d  Furosemide 40mg po BID - last filled 1/21/24 x 90d  Isosorbide dinitrate 20mg po BID - last filled 1/15/24 x 90d  Ranolazine - last filled 1/15/24 x 90d  Dialyvite  Medication doses adjusted:   Amlodipine-->5 mg po BID; last filled 1/19/24  Vascepta 1g capsules - 2 caps po BID (per fill history; last filled 1/30/24)  Other notes:   Unclear if pt is still taking Sevelamer - no recent Rx fills in dispense history     Current Outpatient Medications   Medication Instructions    amLODIPine (NORVASC) 5 mg, Oral, 2 TIMES DAILY    aspirin EC 81 mg, Oral, DAILY    B Complex-C-Folic Acid (DIALYVITE 800) 0.8 MG TABS 1 tablet, Oral, DAILY    Cholecalciferol (VITAMIN D3) 125 MCG (5000 UT) TABS 1 tablet, Oral, DAILY    ezetimibe (ZETIA) 10 mg, Oral, EVERY MORNING    finasteride (PROSCAR) 5 mg, Oral, DAILY    furosemide (LASIX) 40 mg, Oral, 2 TIMES DAILY    Icosapent Ethyl

## 2024-02-13 NOTE — ED NOTES
ED TO INPATIENT SBAR HANDOFF    Patient Name: Dae Girard   :  1950  73 y.o.   MRN:  [id number]  Preferred Name    ED Room #:  B23/B23-23  Family/Caregiver Present no   Restraints no   Sitter no   Sepsis Risk Score Sepsis Risk Score: 11.07    Situation  Code Status: Prior No additional code details.    Allergies: Patient has no known allergies.  Weight: Patient Vitals for the past 96 hrs (Last 3 readings):   Weight   24 2243 78.5 kg (173 lb 1.6 oz)     Arrived from: home  Chief Complaint:   Chief Complaint   Patient presents with    Altered Mental Status     Pt presents to the ED due to AMS per family. Pt is unoriented to the date and believes it is September or December. Pt arrived with an o2 sat of 86 on RA. Pt at 92 on 2L. Pt tested positive for COVID on Friday.     Hospital Problem/Diagnosis:  Active Problems:    * No active hospital problems. *  Resolved Problems:    * No resolved hospital problems. *    Imaging:   XR CHEST (2 VW)   Final Result      1. Mild bilateral basilar airspace disease.      Electronically signed by Delbert Burton MD        Abnormal labs:   Abnormal Labs Reviewed   COVID-19 & INFLUENZA COMBO - Abnormal; Notable for the following components:       Result Value    SARS-CoV-2 RNA, RT PCR DETECTED (*)     All other components within normal limits   CBC WITH AUTO DIFFERENTIAL - Abnormal; Notable for the following components:    WBC 13.1 (*)     RBC 2.92 (*)     Hemoglobin 8.8 (*)     Hematocrit 25.3 (*)     Neutrophils Absolute 11.5 (*)     Lymphocytes Absolute 0.6 (*)     All other components within normal limits   TROPONIN - Abnormal; Notable for the following components:    Troponin, High Sensitivity 92 (*)     All other components within normal limits   TROPONIN - Abnormal; Notable for the following components:    Troponin, High Sensitivity 94 (*)     All other components within normal limits   BRAIN NATRIURETIC PEPTIDE - Abnormal; Notable for the following components:

## 2024-02-13 NOTE — H&P
Internal Medicine  PGY 1  History & Physical      CC Productive cough (home COVID-19 test positive)    History Obtained From:  patient    HISTORY OF PRESENT ILLNESS:  Mr Vicki Girard is a 72 yo M with PMH HLD, HTN, CKD w HD, T2DM who presented to [facility] ED w cc productive cough since 02/09 and a positive home COVID-19 test.    On presentation     Past Medical History:        Diagnosis Date    Fainted 06/2017    High cholesterol     Hypertension     Hypothyroid     Kidney disease     dialysis: Right arm fistula; states will be doing dialysis at home    Type II or unspecified type diabetes mellitus without mention of complication, not stated as uncontrolled        Past Surgical History:        Procedure Laterality Date    CARDIAC SURGERY      Jan 2022    COLONOSCOPY      last colonscopy was 2022    DIALYSIS FISTULA CREATION Right     right arm fistula Dec. 2020    HAND SURGERY Right     carpal tunnel    KNEE ARTHROSCOPY Left     OTHER SURGICAL HISTORY      heart stents Jan 2023       Medications Priorto Admission:    Not in a hospital admission.    Allergies:  Patient has no known allergies.    Social History:   TOBACCO:   reports that he has never smoked. He has never used smokeless tobacco.  ETOH:   reports that he does not currently use alcohol.  DRUGS : none  Patient currently lives with family      Family History:       Problem Relation Age of Onset    Cancer Mother         colon    Heart Disease Father         CHF    Stroke Father         mini strokes    Heart Disease Sister     Kidney Disease Sister     Diabetes Neg Hx     High Blood Pressure Neg Hx     Osteoporosis Neg Hx     Thyroid Disease Neg Hx        Review of Systems   Reason unable to perform ROS: Per HPI.       ROS: A 10 point review of systems was conducted, significant findings as noted in HPI.    Physical Exam  Vitals and nursing note reviewed.   Constitutional:       General: He is not in acute distress.     Appearance: He is ill-appearing. He is not  last 72 hours.  ABGs: No results for input(s): \"PHART\", \"EJE8LXT\", \"PO2ART\" in the last 72 hours.  INR: No results for input(s): \"INR\" in the last 72 hours.    U/A:No results for input(s): \"NITRITE\", \"COLORU\", \"PHUR\", \"LABCAST\", \"WBCUA\", \"RBCUA\", \"MUCUS\", \"TRICHOMONAS\", \"YEAST\", \"BACTERIA\", \"CLARITYU\", \"SPECGRAV\", \"LEUKOCYTESUR\", \"UROBILINOGEN\", \"BILIRUBINUR\", \"BLOODU\", \"GLUCOSEU\", \"AMORPHOUS\" in the last 72 hours.    Invalid input(s): \"KETONESU\"    XR CHEST (2 VW)   Final Result      1. Mild bilateral basilar airspace disease.      Electronically signed by Delbert Burton MD              ASSESSMENT AND PLAN:  Mr Vicki Girard is a 72 yo M with PMH HLD, HTN, CKD w HD, T2DM who presented to [facility] ED w cc productive cough since 02/09 and a positive home COVID-19 test. He was admitted for further evaluation and management.     AHRF 2/2 COVID-19 infection  Wife was feeling sick last week  02/09 started experiencing cough productive of white sputum   02/09 positive COVID-19 home test  02/12 fever of 101, presented to [facility]  Febrile to 103, Leukocytosis w WBC 13.1  New O2 requirement, on 2 L NC  .9  - Decadrone 6 mg Q24H x 10 days  - baricitinib 1 mg QD x 14     - LDH  - D-dimer  - Ferritin    Mild HypoNa  Possibly volume overloaded status, ProBNP 07508  Na 130  - Uosm  - Kamla  - Serum osm     Hypokalemia  K 3.1    Hypochloridemia   Cl 89    Mg 1.8    - monitor and replete as needed    Chronic problems  CKD   HD via R UE fistula    T2DM  Last HgA1c 4.9  On Trulicity 1/wk  02/13 Gluc 128  - LDSSI    HTN  Continue norvasc 2.5    HLD   Continue lipitor 80 mg    BPH  Continue proscar        Chronic Normocytic Anemia likely 2/2 CKD  Hg 8.8, MCV 86.5    Will discuss with attending physician Dr. Sin     Code Status:Full code  FEN: Adult low carb  PPX: SCD, SQH  DISPO: IP Behnam Enayataval, MD  2/13/2024,  3:59 AM

## 2024-02-13 NOTE — PROGRESS NOTES
Internal Medicine Progress Note    Date: 2/13/2024  [facility]  [facility] Day: 0      CC: Altered Mental Status (Pt presents to the ED due to AMS per family. Pt is unoriented to the date and believes it is September or December. Pt arrived with an o2 sat of 86 on RA. Pt at 92 on 2L. Pt tested positive for COVID on Friday.)      Interval Hx   Hypertensive and febrile (103 F) overnight.    Pt seen at bedside. Alert and oriented able to provide history. Concerned about living long enough to see 9-year-old grandson's high school graduation.      HPI: Patient is a 73-year-old male with past medical history of CKD4 on home HD, CAD s/p CABG 1/2022 followed by stents x2 9/2022 and stents x3 1/2023, CVA, hypertension, hyperlipidemia, DM2 w/ neuropathy, PAD, osteopenia, R femur fx s/p surgical repair who presents with dyspnea, productive cough, and home COVID test for 3 days.    Patient came to ED because symptoms were worsening. Has positive SARS-CoV-2 PCR, elevated procalcitonin and CRP so started on dexamethasone and antibiotics for community-acquired pneumonia.      Objective     Vital Signs:  Patient Vitals for the past 8 hrs:   BP Temp Temp src Pulse Resp SpO2   02/13/24 1105 136/65 98.1 °F (36.7 °C) Oral 67 -- 98 %   02/13/24 0904 (!) 150/72 97.8 °F (36.6 °C) Oral 72 18 --   02/13/24 0835 (!) 150/73 97.8 °F (36.6 °C) Oral 72 -- 96 %       Physical Exam  Vitals reviewed.   Constitutional:       General: He is not in acute distress.     Appearance: He is normal weight. He is ill-appearing. He is not diaphoretic.      Interventions: Nasal cannula in place.   HENT:      Head: Normocephalic and atraumatic.      Mouth/Throat:      Mouth: Mucous membranes are moist.      Pharynx: Oropharynx is clear.   Eyes:      General: No scleral icterus.        Right eye: No discharge.         Left eye: No discharge.      Conjunctiva/sclera: Conjunctivae normal.   Cardiovascular:      Rate and Rhythm: Normal rate and

## 2024-02-13 NOTE — PLAN OF CARE
Problem: Discharge Planning  Goal: Discharge to home or other facility with appropriate resources  Outcome: Progressing  Flowsheets (Taken 2/13/2024 0902)  Discharge to home or other facility with appropriate resources: Identify barriers to discharge with patient and caregiver     Problem: Skin/Tissue Integrity  Goal: Absence of new skin breakdown  Description: 1.  Monitor for areas of redness and/or skin breakdown  2.  Assess vascular access sites hourly  3.  Every 4-6 hours minimum:  Change oxygen saturation probe site  4.  Every 4-6 hours:  If on nasal continuous positive airway pressure, respiratory therapy assess nares and determine need for appliance change or resting period.  Outcome: Progressing     Problem: Safety - Adult  Goal: Free from fall injury  Outcome: Progressing  Flowsheets (Taken 2/13/2024 0902)  Free From Fall Injury: Instruct family/caregiver on patient safety     Problem: Neurosensory - Adult  Goal: Achieves stable or improved neurological status  2/13/2024 0902 by Kyler Wynn RN  Outcome: Progressing  Flowsheets (Taken 2/13/2024 0902)  Achieves stable or improved neurological status: Assess for and report changes in neurological status  2/13/2024 0626 by Randall Valdez RN  Outcome: Progressing     Problem: Respiratory - Adult  Goal: Achieves optimal ventilation and oxygenation  2/13/2024 0902 by Kyler Wynn RN  Outcome: Progressing  2/13/2024 0626 by Randall Valdez RN  Outcome: Progressing     Problem: Metabolic/Fluid and Electrolytes - Adult  Goal: Electrolytes maintained within normal limits  2/13/2024 0902 by Kyler Wynn RN  Outcome: Progressing  2/13/2024 0626 by Randall Valdez RN  Outcome: Progressing  Goal: Hemodynamic stability and optimal renal function maintained  2/13/2024 0902 by Kyler Wynn RN  Outcome: Progressing  2/13/2024 0626 by Randall Valdez RN  Outcome: Progressing

## 2024-02-13 NOTE — PROGRESS NOTES
4 Eyes Admission Assessment     I agree as the admission nurse that 2 RN's have performed a thorough Head to Toe Skin Assessment on the patient. ALL assessment sites listed below have been assessed on admission.       Areas assessed by both nurses:   [x]   Head, Face, and Ears   [x]   Shoulders, Back, and Chest  [x]   Arms, Elbows, and Hands   [x]   Coccyx, Sacrum, and Ischum  [x]   Legs, Feet, and Heels        Does the Patient have Skin Breakdown?  No     Note: Non- blanchable redness on the sacrum        Shashi Prevention initiated:  Yes   Wound Care Orders initiated:  No      C nurse consulted for Pressure Injury (Stage 3,4, Unstageable, DTI, NWPT, and Complex wounds):  No      Nurse 1 eSignature: Electronically signed by Randall Valdez RN on 2/13/24 at 4:47 AM EST    **SHARE this note so that the co-signing nurse is able to place an eSignature**    Nurse 2 eSignature: Electronically signed by Ghulam Lowe RN on 2/13/24 at 5:31 AM EST

## 2024-02-13 NOTE — ED PROVIDER NOTES
Ref Range    SARS-CoV-2 RNA, RT PCR DETECTED (A) NOT DETECTED    INFLUENZA A NOT DETECTED NOT DETECTED    INFLUENZA B NOT DETECTED NOT DETECTED   CBC with Auto Differential   Result Value Ref Range    WBC 13.1 (H) 4.0 - 11.0 K/uL    RBC 2.92 (L) 4.20 - 5.90 M/uL    Hemoglobin 8.8 (L) 13.5 - 17.5 g/dL    Hematocrit 25.3 (L) 40.5 - 52.5 %    MCV 86.5 80.0 - 100.0 fL    MCH 30.3 26.0 - 34.0 pg    MCHC 35.0 31.0 - 36.0 g/dL    RDW 15.2 12.4 - 15.4 %    Platelets 294 135 - 450 K/uL    MPV 6.9 5.0 - 10.5 fL    Neutrophils % 87.8 %    Lymphocytes % 4.5 %    Monocytes % 7.0 %    Eosinophils % 0.5 %    Basophils % 0.2 %    Neutrophils Absolute 11.5 (H) 1.7 - 7.7 K/uL    Lymphocytes Absolute 0.6 (L) 1.0 - 5.1 K/uL    Monocytes Absolute 0.9 0.0 - 1.3 K/uL    Eosinophils Absolute 0.1 0.0 - 0.6 K/uL    Basophils Absolute 0.0 0.0 - 0.2 K/uL   Troponin   Result Value Ref Range    Troponin, High Sensitivity 92 (H) 0 - 22 ng/L   Troponin   Result Value Ref Range    Troponin, High Sensitivity 94 (H) 0 - 22 ng/L   BNP   Result Value Ref Range    Pro-BNP 16,570 (H) 0 - 449 pg/mL   Basic Metabolic Panel   Result Value Ref Range    Sodium 130 (L) 136 - 145 mmol/L    Potassium 3.1 (L) 3.5 - 5.1 mmol/L    Chloride 89 (L) 99 - 110 mmol/L    CO2 31 21 - 32 mmol/L    Anion Gap 10 3 - 16    Glucose 128 (H) 70 - 99 mg/dL    BUN 45 (H) 7 - 20 mg/dL    Creatinine 3.7 (H) 0.8 - 1.3 mg/dL    Est, Glom Filt Rate 16 (A) >60    Calcium 8.6 8.3 - 10.6 mg/dL   Magnesium   Result Value Ref Range    Magnesium 1.80 1.80 - 2.40 mg/dL     EKG   See ED course above for interpretation  Interpreted in conjunction with emergencydepartment physician No att. providers found    ED BEDSIDE ULTRASOUND:  No results found.  RECENT VITALS:  BP: (!) 149/54, Temp: (!) 103 °F (39.4 °C), Pulse: 92,Respirations: 18, SpO2: 93 %     Procedures   Procedures    Review of Systems   A comprehensive review of symptoms was performed.  Positives are noted in the HPI, and my ROS is  mouth Daily    LIDOCAINE-PRILOCAINE (EMLA) 2.5-2.5 % CREAM    Apply topically as needed for Pain Apply topically as needed.    LOSARTAN (COZAAR) 100 MG TABLET    Take 1 tablet by mouth daily    METOPROLOL TARTRATE (LOPRESSOR) 25 MG TABLET    Take 1 tablet by mouth 2 times daily    MULTIPLE VITAMIN (MULTIVITAMIN PO)    Take 1 tablet by mouth daily.      PANTOPRAZOLE (PROTONIX) 40 MG TABLET    Take 1 tablet by mouth daily    POLYETHYLENE GLYCOL (GLYCOLAX) 17 GM/SCOOP POWDER    Take 17 g by mouth daily    SEVELAMER HCL (RENAGEL) 800 MG TABLET    Take 1 tablet by mouth 3 times daily (with meals)     Allergies   He has No Known Allergies.  Physical Exam   INITIAL VITALS: BP: (!) 148/54, Temp: (!) 103 °F (39.4 °C), Pulse: 92, Respirations: 18, SpO2: (!) 86 %     General:  Well appearing 73 y.o. male not in acute distress.  Head: Normocephalic. Atraumatic.  Eyes:  Anicteric. PERRLA. EOMI. No discharge from eyes.   ENT:  External ears normal. No discharge from nose. OP clear. MMM.  Neck:  Supple. Trachea midline.  Pulmonary: Non-labored breathing. CTAB. No r/r/w.  Cardiac: RRR. No r/m/g.  Abdomen:  ND. Soft. NTTP. No g/r/r. No HSM. No masses palpated. No CVA tenderness.  Musculoskeletal: No long bone deformities.  Extremities: Extremities warm and perfused. 2+ radial & DP pulses b/l. <2 sec capillary refill. No peripheral edema.  Skin:  Warm. Dry. No apparent rashes.  Neuro:  Alert. Moves all four extremities to command. No gross focal deficit.    -------------------------------  This note was generated in part utilizing Dragon dictation speech recognition software.  Occasionally, words are mistranscribed and despite editing, the text may contain inaccuracies due to incorrect word recognition       Neil Fernandez MD  Resident  02/13/24 0223

## 2024-02-13 NOTE — CONSULTS
Nephrology Consult Note                                                                                                                                                                                                                                                                                                                                                               Office : [phone]     Fax :[phone]              Patient's Name: Dae Girard  8:26 AM  2/13/2024      Assessment/Plan   ESRD on HD T/Th/F/Sun  - no indication for HD today   - Continue HD T/Th/F/Sun schedule here, Right arm fistula   - continue sevelamer 800 mg TID    2. COVID +  - management by primary team    3.  HTN  - continue amlodipine 2.5 mg, losartan 100 mg, and metoprolol 25 mg    4. Volume   - patient appears euvolemic    5.  Anemia of Chronic disease   - monitor CBC  - EPO during HD     6. Bone mineral disease     4.  Hyponatremia  - improving from 130 to 134  - urine studies pending     5.  Hypokalemia  - K 3.0  - replete potassium accordingly         Reason for Consult:  HD  Requesting Physician:  Maik Godwin      Chief Complaint:  Productive cough (home COVID-19 test positive)      History of Present Ilness:    Dae Girard is a 73 y.o. male with PMHx HLD, HTN, CKD w HD, T2DM who presents to [facility] ED with productive cough since 2/09 and a positive home COVID-19 test.       Interval hx   Patient is currently \"doing fabulous\". He is feeling much better. Informed me that he had his hemodialysis session this week on Monday because he was not feeling well on Sunday.     Past Medical History:   Diagnosis Date    Fainted 06/2017    High cholesterol     Hypertension     Hypothyroid     Kidney disease     dialysis: Right arm fistula; states will be doing dialysis at home    Type II or unspecified type diabetes mellitus without mention of complication, not stated as uncontrolled        Past Surgical History:   Procedure  Laterality Date    CARDIAC SURGERY      Jan 2022    COLONOSCOPY      last colonscopy was 2022    DIALYSIS FISTULA CREATION Right     right arm fistula Dec. 2020    HAND SURGERY Right     carpal tunnel    KNEE ARTHROSCOPY Left     OTHER SURGICAL HISTORY      heart stents Jan 2023       Family History   Problem Relation Age of Onset    Cancer Mother         colon    Heart Disease Father         CHF    Stroke Father         mini strokes    Heart Disease Sister     Kidney Disease Sister     Diabetes Neg Hx     High Blood Pressure Neg Hx     Osteoporosis Neg Hx     Thyroid Disease Neg Hx         reports that he has never smoked. He has never used smokeless tobacco. He reports that he does not currently use alcohol. He reports that he does not use drugs.    Allergies:  Patient has no known allergies.    Current Medications:    amLODIPine (NORVASC) tablet 2.5 mg, Daily  aspirin EC tablet 81 mg, Daily  atorvastatin (LIPITOR) tablet 80 mg, Daily  finasteride (PROSCAR) tablet 5 mg, Daily  levothyroxine (SYNTHROID) tablet 50 mcg, Daily  losartan (COZAAR) tablet 100 mg, Daily  metoprolol tartrate (LOPRESSOR) tablet 25 mg, BID  sevelamer (RENVELA) tablet 800 mg, TID WC  omega-3 acid ethyl esters (LOVAZA) capsule 1 g, Daily  sodium chloride flush 0.9 % injection 5-40 mL, 2 times per day  sodium chloride flush 0.9 % injection 5-40 mL, PRN  0.9 % sodium chloride infusion, PRN  ondansetron (ZOFRAN-ODT) disintegrating tablet 4 mg, Q8H PRN   Or  ondansetron (ZOFRAN) injection 4 mg, Q6H PRN  polyethylene glycol (GLYCOLAX) packet 17 g, Daily PRN  acetaminophen (TYLENOL) tablet 650 mg, Q6H PRN   Or  acetaminophen (TYLENOL) suppository 650 mg, Q6H PRN  heparin (porcine) injection 5,000 Units, 3 times per day  dexAMETHasone (PF) (DECADRON) injection 6 mg, Q24H  insulin lispro (HUMALOG) injection vial 0-4 Units, TID WC  insulin lispro (HUMALOG) injection vial 0-4 Units, Nightly  glucose chewable tablet 16 g, PRN  dextrose bolus 10% 125 mL,

## 2024-02-13 NOTE — PLAN OF CARE
Problem: Neurosensory - Adult  Goal: Achieves stable or improved neurological status  Outcome: Progressing    Patient is alert and oriented X 4, able to follow commands and conversations. Will continue to monitor.     Problem: Respiratory - Adult  Goal: Achieves optimal ventilation and oxygenation  Outcome: Progressing    Patient have new oxygen requirement of 2LPM, saturating at 94-96%. Will continue to monitor.     Problem: Metabolic/Fluid and Electrolytes - Adult  Goal: Electrolytes maintained within normal limits  Outcome: Progressing    Patient Potassium 3.1, he have scheduled oral Potassium BID. Will continue to monitor renal panel results.

## 2024-02-13 NOTE — PROGRESS NOTES
Occupational Therapy  Facility/Department: [facility] 4 PCU  Occupational Therapy Initial Assessment    Name: Dae Girard  : 1950  MRN: [id number]  Date of Service: 2024    Discharge Recommendations:  24 hour supervision or assist, Home with assist PRN  OT Equipment Recommendations  Equipment Needed: No       Patient Diagnosis(es): The encounter diagnosis was COVID.  Past Medical History:  has a past medical history of Fainted, High cholesterol, Hypertension, Hypothyroid, Kidney disease, and Type II or unspecified type diabetes mellitus without mention of complication, not stated as uncontrolled.  Past Surgical History:  has a past surgical history that includes Knee arthroscopy (Left); Hand surgery (Right); Cardiac surgery; Dialysis fistula creation (Right); other surgical history; and Colonoscopy.    Treatment Diagnosis: imp mob, tf, ADL      Assessment   Performance deficits / Impairments: Decreased functional mobility ;Decreased ADL status;Decreased endurance  Assessment: From home, admit with covid. Pt completing mobility in room, to and from bathroom, stance at sink for grooming with CGA to SBA no AD. Pt with no significant LOB. Pt would benefit from cont therapies while in acute [facility]  Treatment Diagnosis: imp mob, tf, ADL  Decision Making: Medium Complexity  REQUIRES OT FOLLOW-UP: Yes  Activity Tolerance  Activity Tolerance: Patient Tolerated treatment well        Plan   Occupational Therapy Plan  Times Per Week: 2-5     Restrictions  Position Activity Restriction  Other position/activity restrictions: Up as tolerated, droplet plus    Subjective   General  Chart Reviewed: Yes  Additional Pertinent Hx: 74 yo M with PMH HLD, HTN, CKD w HD, T2DM who presented to [facility] ED w cc productive cough since  and a positive home COVID-19 test.  Referring Practitioner: Enayataval, Behnam, MD  Diagnosis: covid  Subjective  Subjective: In bed agreeable to session, no pain rating  Pt reports no

## 2024-02-14 LAB
ALBUMIN SERPL-MCNC: 3.1 G/DL (ref 3.4–5)
ANION GAP SERPL CALCULATED.3IONS-SCNC: 13 MMOL/L (ref 3–16)
BASOPHILS # BLD: 0 K/UL (ref 0–0.2)
BASOPHILS NFR BLD: 0.2 %
BUN SERPL-MCNC: 73 MG/DL (ref 7–20)
CALCIUM SERPL-MCNC: 8.4 MG/DL (ref 8.3–10.6)
CHLORIDE SERPL-SCNC: 92 MMOL/L (ref 99–110)
CO2 SERPL-SCNC: 28 MMOL/L (ref 21–32)
CREAT SERPL-MCNC: 5 MG/DL (ref 0.8–1.3)
DEPRECATED RDW RBC AUTO: 15 % (ref 12.4–15.4)
EKG ATRIAL RATE: 85 BPM
EKG DIAGNOSIS: NORMAL
EKG P AXIS: 61 DEGREES
EKG P-R INTERVAL: 168 MS
EKG Q-T INTERVAL: 382 MS
EKG QRS DURATION: 100 MS
EKG QTC CALCULATION (BAZETT): 454 MS
EKG R AXIS: 94 DEGREES
EKG T AXIS: 31 DEGREES
EKG VENTRICULAR RATE: 85 BPM
EOSINOPHIL # BLD: 0 K/UL (ref 0–0.6)
EOSINOPHIL NFR BLD: 0.1 %
GFR SERPLBLD CREATININE-BSD FMLA CKD-EPI: 11 ML/MIN/{1.73_M2}
GLUCOSE BLD-MCNC: 147 MG/DL (ref 70–99)
GLUCOSE BLD-MCNC: 163 MG/DL (ref 70–99)
GLUCOSE BLD-MCNC: 171 MG/DL (ref 70–99)
GLUCOSE BLD-MCNC: 211 MG/DL (ref 70–99)
GLUCOSE BLD-MCNC: 251 MG/DL (ref 70–99)
GLUCOSE SERPL-MCNC: 170 MG/DL (ref 70–99)
HCT VFR BLD AUTO: 25.6 % (ref 40.5–52.5)
HGB BLD-MCNC: 8.6 G/DL (ref 13.5–17.5)
LYMPHOCYTES # BLD: 1.2 K/UL (ref 1–5.1)
LYMPHOCYTES NFR BLD: 9.1 %
MAGNESIUM SERPL-MCNC: 2.5 MG/DL (ref 1.8–2.4)
MCH RBC QN AUTO: 29.3 PG (ref 26–34)
MCHC RBC AUTO-ENTMCNC: 33.6 G/DL (ref 31–36)
MCV RBC AUTO: 87.3 FL (ref 80–100)
MONOCYTES # BLD: 0.8 K/UL (ref 0–1.3)
MONOCYTES NFR BLD: 6.1 %
NEUTROPHILS # BLD: 10.8 K/UL (ref 1.7–7.7)
NEUTROPHILS NFR BLD: 84.5 %
PERFORMED ON: ABNORMAL
PHOSPHATE SERPL-MCNC: 4.3 MG/DL (ref 2.5–4.9)
PLATELET # BLD AUTO: 339 K/UL (ref 135–450)
PMV BLD AUTO: 6.9 FL (ref 5–10.5)
POTASSIUM SERPL-SCNC: 3.8 MMOL/L (ref 3.5–5.1)
RBC # BLD AUTO: 2.93 M/UL (ref 4.2–5.9)
SODIUM SERPL-SCNC: 133 MMOL/L (ref 136–145)
WBC # BLD AUTO: 12.8 K/UL (ref 4–11)

## 2024-02-14 PROCEDURE — 6370000000 HC RX 637 (ALT 250 FOR IP)

## 2024-02-14 PROCEDURE — 90935 HEMODIALYSIS ONE EVALUATION: CPT

## 2024-02-14 PROCEDURE — 2580000003 HC RX 258

## 2024-02-14 PROCEDURE — 5A1D70Z PERFORMANCE OF URINARY FILTRATION, INTERMITTENT, LESS THAN 6 HOURS PER DAY: ICD-10-PCS | Performed by: INTERNAL MEDICINE

## 2024-02-14 PROCEDURE — 97116 GAIT TRAINING THERAPY: CPT

## 2024-02-14 PROCEDURE — 80069 RENAL FUNCTION PANEL: CPT

## 2024-02-14 PROCEDURE — 1200000000 HC SEMI PRIVATE

## 2024-02-14 PROCEDURE — 83735 ASSAY OF MAGNESIUM: CPT

## 2024-02-14 PROCEDURE — 94618 PULMONARY STRESS TESTING: CPT

## 2024-02-14 PROCEDURE — 99233 SBSQ HOSP IP/OBS HIGH 50: CPT | Performed by: INTERNAL MEDICINE

## 2024-02-14 PROCEDURE — 6360000002 HC RX W HCPCS

## 2024-02-14 PROCEDURE — 85025 COMPLETE CBC W/AUTO DIFF WBC: CPT

## 2024-02-14 PROCEDURE — 36415 COLL VENOUS BLD VENIPUNCTURE: CPT

## 2024-02-14 PROCEDURE — 97530 THERAPEUTIC ACTIVITIES: CPT

## 2024-02-14 RX ORDER — LEVOFLOXACIN 750 MG/1
750 TABLET, FILM COATED ORAL DAILY
Qty: 6 TABLET | Refills: 0 | Status: SHIPPED | OUTPATIENT
Start: 2024-02-14 | End: 2024-02-14

## 2024-02-14 RX ORDER — LEVOFLOXACIN 250 MG/1
250 TABLET, FILM COATED ORAL DAILY
Qty: 3 TABLET | Refills: 0 | Status: SHIPPED | OUTPATIENT
Start: 2024-02-14 | End: 2024-02-17

## 2024-02-14 RX ADMIN — CEFTRIAXONE 1000 MG: 1 INJECTION, POWDER, FOR SOLUTION INTRAMUSCULAR; INTRAVENOUS at 18:49

## 2024-02-14 RX ADMIN — HEPARIN SODIUM 5000 UNITS: 5000 INJECTION INTRAVENOUS; SUBCUTANEOUS at 06:11

## 2024-02-14 RX ADMIN — FINASTERIDE 5 MG: 5 TABLET, FILM COATED ORAL at 09:08

## 2024-02-14 RX ADMIN — ASPIRIN 81 MG: 81 TABLET, COATED ORAL at 09:08

## 2024-02-14 RX ADMIN — ATORVASTATIN CALCIUM 80 MG: 80 TABLET, FILM COATED ORAL at 09:09

## 2024-02-14 RX ADMIN — SODIUM CHLORIDE, PRESERVATIVE FREE 10 ML: 5 INJECTION INTRAVENOUS at 09:09

## 2024-02-14 RX ADMIN — METOPROLOL TARTRATE 25 MG: 25 TABLET, FILM COATED ORAL at 20:56

## 2024-02-14 RX ADMIN — INSULIN LISPRO 2 UNITS: 100 INJECTION, SOLUTION INTRAVENOUS; SUBCUTANEOUS at 12:12

## 2024-02-14 RX ADMIN — AMLODIPINE BESYLATE 2.5 MG: 2.5 TABLET ORAL at 09:09

## 2024-02-14 RX ADMIN — AZITHROMYCIN MONOHYDRATE 500 MG: 500 INJECTION, POWDER, LYOPHILIZED, FOR SOLUTION INTRAVENOUS at 20:59

## 2024-02-14 RX ADMIN — OMEGA-3-ACID ETHYL ESTERS 1 G: 1 CAPSULE, LIQUID FILLED ORAL at 09:14

## 2024-02-14 RX ADMIN — LEVOTHYROXINE SODIUM 50 MCG: 50 TABLET ORAL at 06:11

## 2024-02-14 RX ADMIN — PANTOPRAZOLE SODIUM 40 MG: 40 TABLET, DELAYED RELEASE ORAL at 06:11

## 2024-02-14 RX ADMIN — DEXAMETHASONE SODIUM PHOSPHATE 6 MG: 10 INJECTION INTRAMUSCULAR; INTRAVENOUS at 04:32

## 2024-02-14 RX ADMIN — HEPARIN SODIUM 5000 UNITS: 5000 INJECTION INTRAVENOUS; SUBCUTANEOUS at 20:56

## 2024-02-14 RX ADMIN — HEPARIN SODIUM 5000 UNITS: 5000 INJECTION INTRAVENOUS; SUBCUTANEOUS at 14:45

## 2024-02-14 RX ADMIN — GUAIFENESIN 600 MG: 600 TABLET ORAL at 20:56

## 2024-02-14 RX ADMIN — GUAIFENESIN 600 MG: 600 TABLET ORAL at 09:09

## 2024-02-14 RX ADMIN — LOSARTAN POTASSIUM 100 MG: 100 TABLET, FILM COATED ORAL at 09:08

## 2024-02-14 RX ADMIN — INSULIN GLARGINE 5 UNITS: 100 INJECTION, SOLUTION SUBCUTANEOUS at 20:57

## 2024-02-14 RX ADMIN — METOPROLOL TARTRATE 25 MG: 25 TABLET, FILM COATED ORAL at 09:09

## 2024-02-14 NOTE — PROGRESS NOTES
Dr. Mccormack notified of pt's pre- wt. NO to remove 2.5  Liters net. Pt and wife agreeable, CN notified.

## 2024-02-14 NOTE — PROGRESS NOTES
Treatment time: 2.5 hours  Net UF: 2500 ml     Pre weight: 78.2 kg  Post weight:73.9 kg  EDW: TBD    Access used: RAVF    Access function: well with  ml/min     Medications or blood products given: none     Regular outpatient schedule: Home Hemodialysis: Sunday, Tuesday, Thursday and Friday.     Summary of response to treatment: Patient tolerated treatment well and without any complications. Patient remained stable throughout entire treatment      Report given to Deepika Whitehead RN and copy of dialysis treatment record placed in chart, to be scanned into EMR.

## 2024-02-14 NOTE — CARE COORDINATION
[facility]    Patient aware and agreeable to services. Faxed orders to [facility] for SOC by 2/16    Monisha Leiva LPN  Care Transition Nurse  [facility]  [phone]

## 2024-02-14 NOTE — PLAN OF CARE
Problem: Respiratory - Adult  Goal: Achieves optimal ventilation and oxygenation  2/14/2024 1513 by Deepika Whitehead, RN  Outcome: Progressing  2/14/2024 0627 by Drinnen, Brianna, RN  Outcome: Progressing  Flowsheets (Taken 2/14/2024 0627)  Achieves optimal ventilation and oxygenation:   Assess for changes in respiratory status   Position to facilitate oxygenation and minimize respiratory effort   Initiate smoking cessation protocol as indicated   Assess for changes in mentation and behavior   Oxygen supplementation based on oxygen saturation or arterial blood gases   Encourage broncho-pulmonary hygiene including cough, deep breathe, incentive spirometry   Assess and instruct to report shortness of breath or any respiratory difficulty   Assess the need for suctioning and aspirate as needed   Respiratory therapy support as indicated     Problem: Safety - Adult  Goal: Free from fall injury  2/14/2024 1513 by Deepika Whitehead, RN  Outcome: Progressing  2/14/2024 0627 by Drinnen, Brianna, RN  Outcome: Progressing  Flowsheets (Taken 2/14/2024 0627)  Free From Fall Injury: Instruct family/caregiver on patient safety

## 2024-02-14 NOTE — PROGRESS NOTES
Patient seen and examined, labs and imaging reviewed, agree with assessment and plan as outlined in pending dc note.  patients condition continues to improve doing well, asked patient to monitor side effects of medications which i've discussed at length, recurrence of symptoms or new symptoms including but not limited to chest pain, shortness of breath, nausea, vomiting, fevers or chills and seek immediate medical attention or call 911.  Greater than 35 minutes spent on case on day of discharge.  D/w wife at bedside.  Full dc note to follow    Sadia Mckeon MD FACP

## 2024-02-14 NOTE — DISCHARGE SUMMARY
INTERNAL MEDICINE DEPARTMENT AT THE [facility]  DISCHARGE SUMMARY    Patient ID: Dae Girard                                             Discharge Date: 2/14/24   Patient's PCP: Maik Godwin                                          Discharge Physician: Sandro Connell DO  Admit Date: 2/12/2024   Admitting Physician: Duong Sin DO    PROBLEMS DURING HOSPITALIZATION:  Present on Admission:   COVID-19      DISCHARGE DIAGNOSES:  #Superimposed bacterial pneumonia on COVID-19  #Hypoxemic respiratory failure  #CKD4 on HD    HPI:  Patient is a 73-year-old male with past medical history of CKD4 on home HD, CAD s/p CABG 1/2022 followed by stents x2 9/2022 and stents x3 1/2023, CVA, hypertension, hyperlipidemia, DM2 w/ neuropathy, PAD, osteopenia, R femur fx s/p surgical repair who presents with dyspnea, productive cough, and home COVID test for 3 days.     Patient came to ED because symptoms were worsening. Has positive SARS-CoV-2 PCR, elevated procalcitonin and CRP so started on dexamethasone and antibiotics for community-acquired pneumonia.    HOSPITAL COURSE:  #Superimposed bacterial pneumonia on COVID-19  #Hypoxemic respiratory failure  Patient presents w/ hypoxia requiring 2-3 L O2. SARS-CoV-2 positive. Temperature 103 F on admission. CRP and WBC elevated and procal 2.1. CXR remarkably shows only mild bibasilar disease. However, suspect bacterial infection given clinical picture and lab findings. High dose steroid therapy initiated, as well as started on ceftriaxone and azithromycin. Respiratory cultures were checked and without result at time of discharge. Vitals remained stable throughout hospital course and oxygen weaned to room air.     #CKD4 on HD  Nephrology consulted for HD. Did not require other Nephrology work up.    Physical Exam:  BP (!) 168/68   Pulse 74   Temp 98 °F (36.7 °C) (Oral)   Resp 20   Ht 1.753 m (5' 9\")   Wt 78.2 kg (172 lb 6.4 oz)   SpO2 94%   BMI 25.46 kg/m²      Physical Exam  Vitals reviewed.   Constitutional:       General: He is not in acute distress.     Appearance: He is normal weight. He is ill-appearing. He is not diaphoretic.      Interventions: Nasal cannula in place.   HENT:      Head: Normocephalic and atraumatic.      Mouth/Throat:      Mouth: Mucous membranes are moist.      Pharynx: Oropharynx is clear.   Eyes:      General: No scleral icterus.        Right eye: No discharge.         Left eye: No discharge.      Conjunctiva/sclera: Conjunctivae normal.   Cardiovascular:      Rate and Rhythm: Normal rate and regular rhythm.      Heart sounds: Murmur heard.      Systolic murmur is present with a grade of 3/6.      No friction rub. No gallop. No S3 or S4 sounds.      Comments: Murmur loudest at apex. No JVD noted on exam.  Pulmonary:      Breath sounds: No stridor. Examination of the right-middle field reveals rales. Examination of the left-middle field reveals rales. Examination of the right-lower field reveals rales. Examination of the left-lower field reveals rales. Rales present. No wheezing or rhonchi.   Abdominal:      General: Abdomen is flat. There is no distension.      Palpations: Abdomen is soft.      Tenderness: There is no abdominal tenderness. There is no guarding.   Musculoskeletal:         General: No swelling.      Cervical back: Neck supple.      Right lower leg: No edema.      Left lower leg: No edema.   Skin:     General: Skin is warm and dry.      Coloration: Skin is pale.   Neurological:      General: No focal deficit present.      Mental Status: He is alert and oriented to person, place, and time.   Psychiatric:         Mood and Affect: Mood normal.         Behavior: Behavior normal.          Consults: nephrology  Significant Diagnostic Studies: chest x-ray  Treatments: antibiotics: ceftriaxone and azithromycin and steroids:  dexamethasone  Disposition: home  Discharged Condition: Stable  Follow Up: Primary Care Physician in one

## 2024-02-14 NOTE — PROGRESS NOTES
Occupational Therapy  Facility/Department: [facility] 4 PCU  Occupational Therapy Treatment & DC     Name: Dae Girard  : 1950  MRN: [id number]  Date of Service: 2024    Discharge Recommendations:  Home with assist PRN          Patient Diagnosis(es): The encounter diagnosis was COVID.  Past Medical History:  has a past medical history of Fainted, High cholesterol, Hypertension, Hypothyroid, Kidney disease, and Type II or unspecified type diabetes mellitus without mention of complication, not stated as uncontrolled.  Past Surgical History:  has a past surgical history that includes Knee arthroscopy (Left); Hand surgery (Right); Cardiac surgery; Dialysis fistula creation (Right); other surgical history; and Colonoscopy.    Treatment Diagnosis: imp mob, tf, ADL      Assessment   Performance deficits / Impairments: Decreased functional mobility ;Decreased ADL status;Decreased endurance  Assessment: From home, admit with covid. Pt completing mobility in room, with SPVN, RW, steady no LOB. Completing multiple laps in room limited 2/2 covid isolation. Pt moving much better this date, reporting improved, feeling close to baseline. Pt with no further acute OT needs, will sign off.  Treatment Diagnosis: imp mob, tf, ADL  REQUIRES OT FOLLOW-UP: No  Activity Tolerance  Activity Tolerance: Patient Tolerated treatment well        Plan   Occupational Therapy Plan  Times Per Week: dc     Restrictions  Position Activity Restriction  Other position/activity restrictions: Up as tolerated, droplet plus    Subjective   General  Chart Reviewed: Yes  Additional Pertinent Hx: 72 yo M with PMH HLD, HTN, CKD w HD, T2DM who presented to [facility] ED w cc productive cough since  and a positive home COVID-19 test.  Referring Practitioner: Enayataval, Behnam, MD  Diagnosis: covid  Subjective  Subjective: In chair agreeable to session, no pain rating     Social/Functional History  Social/Functional History  Lives With: Spouse    OutComes Score                                                  -PAC - ADL  AM-PAC Daily Activity - Inpatient   How much help is needed for putting on and taking off regular lower body clothing?: A Little  How much help is needed for bathing (which includes washing, rinsing, drying)?: A Little  How much help is needed for toileting (which includes using toilet, bedpan, or urinal)?: None  How much help is needed for putting on and taking off regular upper body clothing?: None  How much help is needed for taking care of personal grooming?: None  How much help for eating meals?: None  [facility] Raw Score: 22  AM-PAC Inpatient ADL T-Scale Score : 47.1  ADL Inpatient CMS 0-100% Score: 25.8  ADL Inpatient CMS G-Code Modifier : CJ    Tinneti Score       Goals  Short Term Goals  Time Frame for Short Term Goals: dc  Short Term Goal 1: Fx transfers with SPVN- goal met  Short Term Goal 2: UB/LB dressing SPVN  Short Term Goal 3: toileting SPVN       Therapy Time   Individual Concurrent Group Co-treatment   Time In 0925         Time Out 1003         Minutes 38             Timed Code Treatment Minutes:   38    Total Treatment Minutes:  38      Brynn Josue, OT

## 2024-02-14 NOTE — CARE COORDINATION
Case Management Assessment            Discharge Note                    Date / Time of Note: 2/14/2024 1:24 PM                  Discharge Note Completed by: Izabela Nelson RN    Patient Name: Dae Girard   YOB: 1950  Diagnosis: COVID [U07.1]  COVID-19 [U07.1]   Date / Time: 2/12/2024 10:32 PM    Current PCP: Maik Godwin patient: No    Hospitalization in the last 30 days: No       Advance Directives:  Code Status: Full Code  [place] DNR form completed and on chart: Not Indicated    Financial:  Payor: HU[employer] MEDICARE / Plan: [employer] GOLD PLUS HMO / Product Type: *No Product type* /      Pharmacy:    [facility] [id number] [place], [place] - [address] [phone] - F [phone]  [address] [address]  Phone: [phone] Fax: [phone]      Assistance purchasing medications?: Potential Assistance Purchasing Medications: No  Assistance provided by Case Management: None at this time    Does patient want to participate in local refill/ meds to beds program?:      Meds To Beds General Rules:  1. Can ONLY be done Monday- Friday between 8:30am-5pm  2. Prescription(s) must be in pharmacy by 3pm to be filled same day  3.Copy of patient's insurance/ prescription drug card and patient face sheet must be sent along with the prescription(s)  4. Cost of Rx cannot be added to hospital bill. If financial assistance is needed, please contact unit  or ;  or  CANNOT provide pharmacy voucher for patients co-pays  5. Patients can then  the prescription on their way out of the hospital at discharge, or pharmacy can deliver to the bedside if staff is available. (payment due at time of pick-up or delivery - cash, check, or card accepted)     Able to afford home medications/ co-pay costs: Yes    ADLS:  Current PT AM-PAC Score: 23 /24  Current OT AM-PAC Score: 22 /24      DISCHARGE Disposition: Home with

## 2024-02-14 NOTE — PROGRESS NOTES
Renal Progress Note  Subjective:   Admit Date: 2/12/2024       Assessment/Plan   ESRD on HD T/Th/F/Sun  - HD today  - Ok to be discharged per nephrology perspective  - Continue HD T/Th/F/Sun schedule here, Right arm fistula   - continue sevelamer 800 mg TID     2. COVID +  - management by primary team     3.  HTN  - continue amlodipine 2.5 mg, losartan 100 mg, and metoprolol 25 mg     4. Volume   - patient appears euvolemic     5.  Anemia of Chronic disease   - monitor CBC  - EPO during HD      6. Bone mineral disease      4.  Hyponatremia  - improving Na 133     5.  Hypokalemia, improved  - K 3.4  - replete potassium accordingly           HPI   Dae Girard is a 73 y.o. male with PMHx HLD, HTN, CKD w HD, T2DM who presents to [facility] ED with productive cough since 2/09 and a positive home COVID-19 test      Interval History:   Patient is doing well today. Has no complaints.       DIET ADULT DIET; Regular; 3 carb choices (45 gm/meal); Low Fat/Low Chol/High Fiber/2 gm Na  Medications:   Scheduled Meds:   amLODIPine  2.5 mg Oral Daily    aspirin EC  81 mg Oral Daily    atorvastatin  80 mg Oral Daily    finasteride  5 mg Oral Daily    levothyroxine  50 mcg Oral Daily    losartan  100 mg Oral Daily    metoprolol tartrate  25 mg Oral BID    sevelamer  800 mg Oral TID WC    omega-3 acid ethyl esters  1 g Oral Daily    sodium chloride flush  5-40 mL IntraVENous 2 times per day    dexAMETHasone  6 mg IntraVENous Q24H    insulin lispro  0-4 Units SubCUTAneous TID WC    insulin lispro  0-4 Units SubCUTAneous Nightly    pantoprazole  40 mg Oral QAM AC    insulin glargine  5 Units SubCUTAneous Nightly    heparin (porcine)  5,000 Units SubCUTAneous 3 times per day    cefTRIAXone (ROCEPHIN) IV  1,000 mg IntraVENous Q24H    azithromycin  500 mg IntraVENous Q24H    guaiFENesin  600 mg Oral BID     Continuous Infusions:   sodium chloride      dextrose         Labs:  CBC:   Recent Labs     02/12/24  2348 02/13/24  1702 02/14/24  0524    WBC 13.1*  --  12.8*   HGB 8.8* 9.5* 8.6*     --  339     BMP:    Recent Labs     02/12/24  2348 02/13/24  0510 02/14/24  0524   * 134* 133*   K 3.1* 3.0* 3.8   CL 89* 94* 92*   CO2 31 27 28   BUN 45* 44* 73*   CREATININE 3.7* 3.5* 5.0*   GLUCOSE 128* 110* 170*     Ca/Mg/Phos:   Recent Labs     02/12/24 2348 02/13/24 0510 02/14/24  0524   CALCIUM 8.6 7.7* 8.4   MG 1.80 1.70* 2.50*   PHOS  --  2.7 4.3     Hepatic:   Recent Labs     02/13/24  0947   AST 53*   ALT 64*   BILITOT 0.9   ALKPHOS 90     Troponin: No results for input(s): \"TROPONINI\" in the last 72 hours.  BNP: No results for input(s): \"BNP\" in the last 72 hours.  Lipids: No results for input(s): \"CHOL\", \"TRIG\", \"HDL\", \"LDLCALC\" in the last 72 hours.    Invalid input(s): \"LABVLDL\"  ABGs: No results for input(s): \"PHART\", \"PO2ART\", \"ZXH8XYA\" in the last 72 hours.  INR:   Recent Labs     02/13/24  0947   INR 1.22*     UA:No results for input(s): \"COLORU\", \"CLARITYU\", \"GLUCOSEU\", \"BILIRUBINUR\", \"KETUA\", \"SPECGRAV\", \"BLOODU\", \"PHUR\", \"PROTEINU\", \"UROBILINOGEN\", \"NITRU\", \"LEUKOCYTESUR\", \"URINETYPE\" in the last 72 hours.    Invalid input(s): \"LABMICR\"   Urine Microscopic: No results for input(s): \"LABCAST\", \"BACTERIA\", \"COMU\", \"HYALCAST\", \"WBCUA\", \"RBCUA\", \"EPIU\" in the last 72 hours.  Urine Culture: No results for input(s): \"LABURIN\" in the last 72 hours.  Urine Chemistry: No results for input(s): \"CLUR\", \"LABCREA\", \"PROTEINUR\", \"NAUR\" in the last 72 hours.    Objective:   Vitals: BP (!) 168/68   Pulse 74   Temp 97.7 °F (36.5 °C) (Oral)   Resp 20   Ht 1.753 m (5' 9\")   Wt 78.2 kg (172 lb 6.4 oz)   SpO2 96%   BMI 25.46 kg/m²    Wt Readings from Last 3 Encounters:   02/14/24 78.2 kg (172 lb 6.4 oz)   09/15/23 75.3 kg (166 lb 1.6 oz)   08/29/23 70.2 kg (154 lb 12.2 oz)      24HR INTAKE/OUTPUT:    Intake/Output Summary (Last 24 hours) at 2/14/2024 0933  Last data filed at 2/13/2024 2141  Gross per 24 hour   Intake 240 ml   Output 200 ml   Net

## 2024-02-14 NOTE — PROGRESS NOTES
Physical Therapy    Daily Treatment Note and Discharge Summary      Discharge Recommendations:  Home with assist PRN, No therapy recommended at discharge  Equipment Needs:  None    Assessment:  Pt with increased using rolling walker.  Increased gait distance today.  Pt maintaining SpO2 >90% on room air with activity/ambulation.  Acute cure PT goals met.  No further acute PT needs at this time.  Will sign off.    Chart Reviewed: Yes   Other position/activity restrictions: Up as tolerated, droplet plus   Additional Pertinent Hx: Pt to ED 2/12 with AMS and SpO2 86% on room air.  Pt (+) for COVID.  CXR: mild bibasilar airspace disease.  PMH:  HTN, DM, syncope, kidney disease      Diagnosis: COVID-19   Treatment Diagnosis: Decreased gait associated with COVID-19.      Subjective:  Pt found sitting up in chair.  Pt reports feeling much better today.  Pt very talkative throughout session with ongoing conversation.     Pain:  Denies      Objective:    Transfers  Sit to stand:  Modified independent (to walker)  Stand to sit:  Modified independent    Ambulation  Assistance Level:  Supervision  Assistive device:  Rolling walker  Distance:  400ft + 100ft (in room due to covid isolation)  Quality of gait:  steady gait, no loss of balance  Other:  SpO2 97% on 2L with first bout of ambulation.  SpO2 94% on room air for 2nd bout of ambulation.  RN aware.    Neuro/balance  Sitting balance:  WFL  Static standing:  Good (with UE support of walker)  Dynamic standing:  Good (with UE support of walker)        Patient Education  Role of PT and d/c recommendations for home.  Pt verbalized understanding and expresses no concerns about d/c.      Safety Devices  Pt left with needs in reach, seated in chair with alarm in place an RN notified.         AM-PAC score  AM-PAC Inpatient Mobility Raw Score : 23  AM-PAC Inpatient T-Scale Score : 56.93  Mobility Inpatient CMS 0-100% Score: 11.2  Mobility Inpatient CMS G-Code Modifier :  CI    Goals:  Time Frame for Short Term Goals: Discharge  Short Term Goal 1: supine <> sit independent (goal not addressed)  Short Term Goal 2: sit <> stand supervision (goal met 2/14/2024 )  Short Term Goal 3: ambulate 100ft with/without assistive device supervision (goal met 2/14/2024 )          Plan:  Discharge acute PT      Therapy Time    Individual  Concurrent  Group  Co-treatment    Time In  0920            Time Out  1000            Minutes  40            Timed Code Treatment Minutes:  40  Total Treatment Minutes:  40      If patient is discharged prior to next treatment, this note will serve as the discharge summary.    Carolina Lovell, PT

## 2024-02-14 NOTE — PLAN OF CARE
Problem: Discharge Planning  Goal: Discharge to home or other facility with appropriate resources  Outcome: Progressing  Flowsheets (Taken 2/14/2024 0627)  Discharge to home or other facility with appropriate resources:   Identify barriers to discharge with patient and caregiver   Identify discharge learning needs (meds, wound care, etc)   Arrange for needed discharge resources and transportation as appropriate     Problem: Skin/Tissue Integrity  Goal: Absence of new skin breakdown  Description: 1.  Monitor for areas of redness and/or skin breakdown  2.  Assess vascular access sites hourly  3.  Every 4-6 hours minimum:  Change oxygen saturation probe site  4.  Every 4-6 hours:  If on nasal continuous positive airway pressure, respiratory therapy assess nares and determine need for appliance change or resting period.  Outcome: Progressing     Problem: Safety - Adult  Goal: Free from fall injury  2/14/2024 0627 by Drinnen, Brianna, RN  Outcome: Progressing  Flowsheets (Taken 2/14/2024 0627)  Free From Fall Injury: Instruct family/caregiver on patient safety     Problem: Neurosensory - Adult  Goal: Achieves stable or improved neurological status  Outcome: Progressing  Flowsheets (Taken 2/14/2024 0627)  Achieves stable or improved neurological status:   Assess for and report changes in neurological status   Initiate measures to prevent increased intracranial pressure   Maintain blood pressure and fluid volume within ordered parameters to optimize cerebral perfusion and minimize risk of hemorrhage     Problem: Respiratory - Adult  Goal: Achieves optimal ventilation and oxygenation  Outcome: Progressing  Flowsheets (Taken 2/14/2024 0627)  Achieves optimal ventilation and oxygenation:   Assess for changes in respiratory status   Position to facilitate oxygenation and minimize respiratory effort   Initiate smoking cessation protocol as indicated   Assess for changes in mentation and behavior   Oxygen supplementation based

## 2024-02-15 VITALS
BODY MASS INDEX: 24.29 KG/M2 | WEIGHT: 164.02 LBS | OXYGEN SATURATION: 98 % | HEART RATE: 75 BPM | TEMPERATURE: 97.7 F | SYSTOLIC BLOOD PRESSURE: 181 MMHG | HEIGHT: 69 IN | DIASTOLIC BLOOD PRESSURE: 73 MMHG | RESPIRATION RATE: 16 BRPM

## 2024-02-15 LAB
ALBUMIN SERPL-MCNC: 3.5 G/DL (ref 3.4–5)
ANION GAP SERPL CALCULATED.3IONS-SCNC: 14 MMOL/L (ref 3–16)
BASOPHILS # BLD: 0.1 K/UL (ref 0–0.2)
BASOPHILS NFR BLD: 0.3 %
BUN SERPL-MCNC: 60 MG/DL (ref 7–20)
CALCIUM SERPL-MCNC: 8.9 MG/DL (ref 8.3–10.6)
CHLORIDE SERPL-SCNC: 92 MMOL/L (ref 99–110)
CO2 SERPL-SCNC: 26 MMOL/L (ref 21–32)
CREAT SERPL-MCNC: 3.8 MG/DL (ref 0.8–1.3)
DEPRECATED RDW RBC AUTO: 15.2 % (ref 12.4–15.4)
EOSINOPHIL # BLD: 0 K/UL (ref 0–0.6)
EOSINOPHIL NFR BLD: 0 %
GFR SERPLBLD CREATININE-BSD FMLA CKD-EPI: 16 ML/MIN/{1.73_M2}
GLUCOSE BLD-MCNC: 217 MG/DL (ref 70–99)
GLUCOSE SERPL-MCNC: 159 MG/DL (ref 70–99)
HCT VFR BLD AUTO: 29.7 % (ref 40.5–52.5)
HGB BLD-MCNC: 10.1 G/DL (ref 13.5–17.5)
LYMPHOCYTES # BLD: 1.5 K/UL (ref 1–5.1)
LYMPHOCYTES NFR BLD: 7.8 %
MAGNESIUM SERPL-MCNC: 2.4 MG/DL (ref 1.8–2.4)
MCH RBC QN AUTO: 30 PG (ref 26–34)
MCHC RBC AUTO-ENTMCNC: 34.2 G/DL (ref 31–36)
MCV RBC AUTO: 87.7 FL (ref 80–100)
MONOCYTES # BLD: 1.2 K/UL (ref 0–1.3)
MONOCYTES NFR BLD: 6.3 %
NEUTROPHILS # BLD: 16.5 K/UL (ref 1.7–7.7)
NEUTROPHILS NFR BLD: 85.6 %
PERFORMED ON: ABNORMAL
PHOSPHATE SERPL-MCNC: 4.7 MG/DL (ref 2.5–4.9)
PLATELET # BLD AUTO: 444 K/UL (ref 135–450)
PMV BLD AUTO: 7.2 FL (ref 5–10.5)
POTASSIUM SERPL-SCNC: 4 MMOL/L (ref 3.5–5.1)
RBC # BLD AUTO: 3.38 M/UL (ref 4.2–5.9)
SODIUM SERPL-SCNC: 132 MMOL/L (ref 136–145)
WBC # BLD AUTO: 19.3 K/UL (ref 4–11)

## 2024-02-15 PROCEDURE — 6370000000 HC RX 637 (ALT 250 FOR IP)

## 2024-02-15 PROCEDURE — 6360000002 HC RX W HCPCS

## 2024-02-15 PROCEDURE — 36415 COLL VENOUS BLD VENIPUNCTURE: CPT

## 2024-02-15 PROCEDURE — 80069 RENAL FUNCTION PANEL: CPT

## 2024-02-15 PROCEDURE — 85025 COMPLETE CBC W/AUTO DIFF WBC: CPT

## 2024-02-15 PROCEDURE — 2580000003 HC RX 258

## 2024-02-15 PROCEDURE — 83735 ASSAY OF MAGNESIUM: CPT

## 2024-02-15 RX ADMIN — LOSARTAN POTASSIUM 100 MG: 100 TABLET, FILM COATED ORAL at 09:36

## 2024-02-15 RX ADMIN — SEVELAMER CARBONATE 800 MG: 800 TABLET, FILM COATED ORAL at 09:36

## 2024-02-15 RX ADMIN — SODIUM CHLORIDE, PRESERVATIVE FREE 10 ML: 5 INJECTION INTRAVENOUS at 09:41

## 2024-02-15 RX ADMIN — AMLODIPINE BESYLATE 2.5 MG: 2.5 TABLET ORAL at 09:36

## 2024-02-15 RX ADMIN — HEPARIN SODIUM 5000 UNITS: 5000 INJECTION INTRAVENOUS; SUBCUTANEOUS at 04:57

## 2024-02-15 RX ADMIN — METOPROLOL TARTRATE 25 MG: 25 TABLET, FILM COATED ORAL at 09:36

## 2024-02-15 RX ADMIN — FINASTERIDE 5 MG: 5 TABLET, FILM COATED ORAL at 09:36

## 2024-02-15 RX ADMIN — ASPIRIN 81 MG: 81 TABLET, COATED ORAL at 09:36

## 2024-02-15 RX ADMIN — LEVOTHYROXINE SODIUM 50 MCG: 50 TABLET ORAL at 05:01

## 2024-02-15 RX ADMIN — ATORVASTATIN CALCIUM 80 MG: 80 TABLET, FILM COATED ORAL at 09:35

## 2024-02-15 RX ADMIN — DEXAMETHASONE SODIUM PHOSPHATE 6 MG: 10 INJECTION INTRAMUSCULAR; INTRAVENOUS at 04:57

## 2024-02-15 RX ADMIN — GUAIFENESIN 600 MG: 600 TABLET ORAL at 09:35

## 2024-02-15 RX ADMIN — PANTOPRAZOLE SODIUM 40 MG: 40 TABLET, DELAYED RELEASE ORAL at 05:01

## 2024-02-15 NOTE — PROGRESS NOTES
\"BLOODU\", \"GLUCOSEU\", \"KETONES\", \"AMORPHOUS\" in the last 72 hours.    Radiology:  XR CHEST (2 VW)   Final Result      1. Mild bilateral basilar airspace disease.      Electronically signed by Delbert Burton MD          Other tests:  TTE 1/12/2023  - Left ventricle: The cavity size is normal. Wall thickness was increased in a pattern of mild LVH.     Systolic function was normal. Features are consistent with a pseudonormal left ventricular filling     pattern, with concomitant abnormal relaxation and increased filling pressure (grade 2 diastolic     dysfunction).   - Aortic valve: Mild thickening. Mild calcification. There is mild stenosis. The valve area by the     velocity-time integral method is 1.4cm^2. The valve area by the peak velocity method is 1.5cm^2.     The valve area by the mean velocity method is 1.4cm^2.   - Mitral valve: The valve area is 2.3cm^2. The valve area (LVOT continuity) is 2.3cm^2.   - Left atrium: The atrium is dilated.   - Right ventricle: Systolic function was normal by visual assessment.       Assessment & Plan   Dae Girard is a 73 y.o. male w/ PMHx CKD4 on home HD, extensive CAD s/p CABG and stents, CVA, hypertension, hyperlipidemia, DM2 w/ neuropathy, PAD, osteopenia, R femur fx s/p surgical repair who p/w dyspnea, productive cough, and home COVID test for 3 days.    #Superimposed bacterial pneumonia on COVID-19  #Hypoxemic respiratory failure  Patient presents w/ hypoxia requiring 2-3 L O2. SARS-CoV-2 positive. Temperature 103 F on admission. CRP and WBC elevated and procal > 2. CXR remarkably shows only mild bibasilar disease. However, suspect bacterial infection given clinical picture and lab findings.  - Ceftriaxone and azithromycin transition to PO before discharge  - F/u respiratory cultures  - Duonebs, mucolytics, HTS for secretions    #CKD4 on HD  On HD since 2021 likely etiology is HTN and DM.  - Nephrology following    #HFpEF G2DD  #HTN  #HLD  #CAD  #PAD  5 stents after

## 2024-02-15 NOTE — PLAN OF CARE
Problem: Discharge Planning  Goal: Discharge to home or other facility with appropriate resources  Outcome: Progressing  Flowsheets (Taken 2/15/2024 0623)  Discharge to home or other facility with appropriate resources:   Identify barriers to discharge with patient and caregiver   Arrange for needed discharge resources and transportation as appropriate   Identify discharge learning needs (meds, wound care, etc)     Problem: Skin/Tissue Integrity  Goal: Absence of new skin breakdown  Description: 1.  Monitor for areas of redness and/or skin breakdown  2.  Assess vascular access sites hourly  3.  Every 4-6 hours minimum:  Change oxygen saturation probe site  4.  Every 4-6 hours:  If on nasal continuous positive airway pressure, respiratory therapy assess nares and determine need for appliance change or resting period.  Outcome: Progressing  Note: Patient verbalizes understanding on importance of turning at least every two hours to alleviate pressure and help to prevent skin breakdown.     Problem: Safety - Adult  Goal: Free from fall injury  Outcome: Progressing  Flowsheets (Taken 2/15/2024 0635)  Free From Fall Injury: Instruct family/caregiver on patient safety  Note: Pt will remain free from accidental injury this shift. Pt has fall risk measures (fall risk bracelet, fall risk sign, fall risk cloth, non-slip socks, dome light on) in place. Pt bed is in low position, bed alarm on, bed wheels locked, call light within reach, bedside table within reach, chair wheels locked, chair alarm on.        Problem: Respiratory - Adult  Goal: Achieves optimal ventilation and oxygenation  Outcome: Progressing  Flowsheets (Taken 2/15/2024 0635)  Achieves optimal ventilation and oxygenation:   Assess for changes in respiratory status   Assess for changes in mentation and behavior   Position to facilitate oxygenation and minimize respiratory effort   Oxygen supplementation based on oxygen saturation or arterial blood gases  Note:

## 2024-02-16 ENCOUNTER — CARE COORDINATION (OUTPATIENT)
Dept: CASE MANAGEMENT | Age: 74
End: 2024-02-16

## 2024-02-16 NOTE — PROGRESS NOTES
RN rechecks BP before patient discharges. Systolic BP in the low 150's. RN continues with discharge planning.

## 2024-02-16 NOTE — CARE COORDINATION
Date/Time:  2/16/2024 11:15 AM  Attempted to reach patient by telephone. Call within 2 business days of discharge: Yes,  Left HIPPA compliant message requesting a return call. Will attempt to reach patient again.    CTN confirmed with intake department with [facility], referral was received.    Thank You,    Delma Jalloh RN  Care Transition Coordinator  Contact Number:[phone]      
Attending Attestation (For Attendings USE Only)...
Attending Attestation (For Attendings USE Only)...

## 2024-02-16 NOTE — PLAN OF CARE
Problem: Discharge Planning  Goal: Discharge to home or other facility with appropriate resources  Outcome: Adequate for Discharge     Problem: Skin/Tissue Integrity  Goal: Absence of new skin breakdown  Description: 1.  Monitor for areas of redness and/or skin breakdown  2.  Assess vascular access sites hourly  3.  Every 4-6 hours minimum:  Change oxygen saturation probe site  4.  Every 4-6 hours:  If on nasal continuous positive airway pressure, respiratory therapy assess nares and determine need for appliance change or resting period.  Outcome: Adequate for Discharge   Patient turns self  Problem: Safety - Adult  Goal: Free from fall injury  Outcome: Adequate for Discharge   Call light within reach, bed alarm on  Problem: Safety - Adult  Goal: Free from fall injury  Outcome: Adequate for Discharge     Problem: Neurosensory - Adult  Goal: Achieves stable or improved neurological status  Outcome: Adequate for Discharge     Problem: Respiratory - Adult  Goal: Achieves optimal ventilation and oxygenation  Outcome: Adequate for Discharge     Problem: Metabolic/Fluid and Electrolytes - Adult  Goal: Electrolytes maintained within normal limits  Outcome: Adequate for Discharge     Problem: Metabolic/Fluid and Electrolytes - Adult  Goal: Hemodynamic stability and optimal renal function maintained  Outcome: Adequate for Discharge     Problem: Chronic Conditions and Co-morbidities  Goal: Patient's chronic conditions and co-morbidity symptoms are monitored and maintained or improved  Outcome: Adequate for Discharge

## 2024-02-17 LAB
BACTERIA BLD CULT ORG #2: NORMAL
BACTERIA BLD CULT: NORMAL

## 2024-02-19 ENCOUNTER — CARE COORDINATION (OUTPATIENT)
Dept: CASE MANAGEMENT | Age: 74
End: 2024-02-19

## 2024-02-19 NOTE — CARE COORDINATION
Date/Time:  2/19/2024 1: 16 PM  2ND Attempted to reach patient by telephone. Call within 2 business days of discharge: Yes,  Left HIPPA compliant message requesting a return call. CTN will close out CTN/COVID-19 Monitoring episode at this time.    Thank You,    Delma Jalloh RN  Care Transition Coordinator  Contact Number:[phone]

## 2024-02-20 NOTE — PROGRESS NOTES
Physician Progress Note      PATIENT:               GABY ZULUAGA  CSN #:                  [id number]  :                       1950  ADMIT DATE:       2024 10:32 PM  DISCH DATE:        2/15/2024 11:45 AM  RESPONDING  PROVIDER #:        Sadia Mckeon MD          QUERY TEXT:    Pt admitted with Covid-19. Pt noted to have Presenting Temp 103 (39.4), HR 92,   WBC: 13.1. Procal: 2.07, CRP: 164.9.If possible, please document in the   progress notes and discharge summary if you are evaluating and /or treating   any of the following:    The medical record reflects the following:  Risk Factors: 73 y.o. male with hx of HTN, HLD, CKD w/HD  Clinical Indicators:  Presenting Temp 103 (39.4), HR 92, WBC: 13.1. Procal:   2.07, CRP: 164.9.  Treatment:  Azithromax, Rocephin, Decadron  Options provided:  -- Sepsis, present on admission 2/2 Covid-19  -- Sepsis was ruled out  -- Other - I will add my own diagnosis  -- Disagree - Not applicable / Not valid  -- Disagree - Clinically unable to determine / Unknown  -- Refer to Clinical Documentation Reviewer    PROVIDER RESPONSE TEXT:    This patient has sepsis 2/2 Covid-19, which was present on admission.    Query created by: Faustino Deleon on 2024 5:33 PM      Electronically signed by:  Sadia Mckeon MD 2024 9:10 AM

## 2024-02-21 NOTE — PROGRESS NOTES
Physician Progress Note      PATIENT:               GABY ZULUAGA  CSN #:                  [id number]  :                       1950  ADMIT DATE:       2024 10:32 PM  DISCH DATE:        2/15/2024 11:45 AM  RESPONDING  PROVIDER #:        Sadia Mckeon MD          QUERY TEXT:    Patient admitted with sepsis due to COVID-19. Noted documentation of acute   respiratory failure in Hypoxemic respiratory failure in MD notes 2/15/24. In   order to support the diagnosis of acute respiratory failure, please include   additional clinical indicators in your documentation.  Or please document if   the diagnosis of acute respiratory failure has been ruled out after further   study.      The medical record reflects the following:  Risk Factors: COVID with superimposed PNA  Clinical Indicators:  ED notes  \"Pt arrived with an o2 sat of 86 on RA. No   overt chest pain or shortness of breath\"; V/S on   @ 23:13-O2 sat= 94% on 2L (FiO2=30%)  Treatment: CXR, Oxygen therapy protocol, V/S per unit protocol    Acute Respiratory Failure Clinical Indicators per [employer] Training Guide and   Quick Reference Guide:  pO2 < 60 mmHg or SpO2 (pulse oximetry) < 91% breathing room air  pCO2 > 50 and pH < 7.35  P/F ratio (pO2 / FIO2) < 300  pO2 decrease or pCO2 increase by 10 mmHg from baseline (if known)  Supplemental oxygen of 40% or more  Presence of respiratory distress, tachypnea, dyspnea, shortness of breath,   wheezing  Unable to speak in complete sentences  Use of accessory muscles to breathe  Extreme anxiety and feeling of impending doom  Tripod position  Confusion/altered mental status/obtunded  Options provided:  -- Acute Respiratory Failure as evidenced by, Please document evidence.  -- Acute Respiratory Failure ruled out after study  -- Other - I will add my own diagnosis  -- Disagree - Not applicable / Not valid  -- Disagree - Clinically unable to determine / Unknown  -- Refer to Clinical Documentation

## 2024-05-17 ENCOUNTER — HOSPITAL ENCOUNTER (OUTPATIENT)
Dept: GENERAL RADIOLOGY | Age: 74
Discharge: HOME | End: 2024-05-17
Payer: MEDICARE

## 2024-05-17 DIAGNOSIS — J12.82 PNEUMONIA DUE TO COVID-19 VIRUS: ICD-10-CM

## 2024-05-17 DIAGNOSIS — U07.1 PNEUMONIA DUE TO COVID-19 VIRUS: ICD-10-CM

## 2024-05-17 PROCEDURE — 71046 X-RAY EXAM CHEST 2 VIEWS: CPT

## 2024-07-09 ENCOUNTER — HOSPITAL ENCOUNTER (EMERGENCY)
Age: 74
Discharge: HOME | End: 2024-07-09
Attending: EMERGENCY MEDICINE
Payer: MEDICARE

## 2024-07-09 VITALS
DIASTOLIC BLOOD PRESSURE: 55 MMHG | SYSTOLIC BLOOD PRESSURE: 144 MMHG | HEART RATE: 57 BPM | BODY MASS INDEX: 25.56 KG/M2 | WEIGHT: 168.65 LBS | OXYGEN SATURATION: 95 % | HEIGHT: 68 IN | TEMPERATURE: 98.2 F | RESPIRATION RATE: 9 BRPM

## 2024-07-09 DIAGNOSIS — R11.2 NAUSEA AND VOMITING, UNSPECIFIED VOMITING TYPE: Primary | ICD-10-CM

## 2024-07-09 LAB
ANION GAP SERPL CALCULATED.3IONS-SCNC: 19 MMOL/L (ref 3–16)
ANISOCYTOSIS BLD QL SMEAR: ABNORMAL
BASOPHILS # BLD: 0 K/UL (ref 0–0.2)
BASOPHILS NFR BLD: 0 %
BUN SERPL-MCNC: 52 MG/DL (ref 7–20)
CALCIUM SERPL-MCNC: 9 MG/DL (ref 8.3–10.6)
CHLORIDE SERPL-SCNC: 89 MMOL/L (ref 99–110)
CO2 SERPL-SCNC: 24 MMOL/L (ref 21–32)
CREAT SERPL-MCNC: 3.8 MG/DL (ref 0.8–1.3)
DEPRECATED RDW RBC AUTO: 19.1 % (ref 12.4–15.4)
EKG ATRIAL RATE: 58 BPM
EKG DIAGNOSIS: NORMAL
EKG P AXIS: 16 DEGREES
EKG P-R INTERVAL: 214 MS
EKG Q-T INTERVAL: 504 MS
EKG QRS DURATION: 110 MS
EKG QTC CALCULATION (BAZETT): 494 MS
EKG R AXIS: 29 DEGREES
EKG T AXIS: 61 DEGREES
EKG VENTRICULAR RATE: 58 BPM
EOSINOPHIL # BLD: 0.2 K/UL (ref 0–0.6)
EOSINOPHIL NFR BLD: 2 %
GFR SERPLBLD CREATININE-BSD FMLA CKD-EPI: 16 ML/MIN/{1.73_M2}
GLUCOSE SERPL-MCNC: 127 MG/DL (ref 70–99)
HCT VFR BLD AUTO: 30.7 % (ref 40.5–52.5)
HGB BLD-MCNC: 10.3 G/DL (ref 13.5–17.5)
LYMPHOCYTES # BLD: 2.1 K/UL (ref 1–5.1)
LYMPHOCYTES NFR BLD: 25 %
MAGNESIUM SERPL-MCNC: 2 MG/DL (ref 1.8–2.4)
MCH RBC QN AUTO: 30.1 PG (ref 26–34)
MCHC RBC AUTO-ENTMCNC: 33.7 G/DL (ref 31–36)
MCV RBC AUTO: 89.3 FL (ref 80–100)
MONOCYTES # BLD: 0.2 K/UL (ref 0–1.3)
MONOCYTES NFR BLD: 2 %
NEUTROPHILS # BLD: 5.8 K/UL (ref 1.7–7.7)
NEUTROPHILS NFR BLD: 71 %
PLATELET # BLD AUTO: 247 K/UL (ref 135–450)
PMV BLD AUTO: 6.6 FL (ref 5–10.5)
POTASSIUM SERPL-SCNC: 3.5 MMOL/L (ref 3.5–5.1)
RBC # BLD AUTO: 3.44 M/UL (ref 4.2–5.9)
SODIUM SERPL-SCNC: 132 MMOL/L (ref 136–145)
WBC # BLD AUTO: 8.2 K/UL (ref 4–11)

## 2024-07-09 PROCEDURE — 85025 COMPLETE CBC W/AUTO DIFF WBC: CPT

## 2024-07-09 PROCEDURE — 93005 ELECTROCARDIOGRAM TRACING: CPT | Performed by: PHYSICIAN ASSISTANT

## 2024-07-09 PROCEDURE — 80048 BASIC METABOLIC PNL TOTAL CA: CPT

## 2024-07-09 PROCEDURE — 99284 EMERGENCY DEPT VISIT MOD MDM: CPT

## 2024-07-09 PROCEDURE — 83735 ASSAY OF MAGNESIUM: CPT

## 2024-07-09 ASSESSMENT — ENCOUNTER SYMPTOMS
VOMITING: 1
CHEST TIGHTNESS: 0
NAUSEA: 1
DIARRHEA: 0
ABDOMINAL PAIN: 0
SHORTNESS OF BREATH: 0
COUGH: 0

## 2024-07-09 ASSESSMENT — LIFESTYLE VARIABLES
HOW OFTEN DO YOU HAVE A DRINK CONTAINING ALCOHOL: NEVER
HOW MANY STANDARD DRINKS CONTAINING ALCOHOL DO YOU HAVE ON A TYPICAL DAY: PATIENT DOES NOT DRINK

## 2024-07-09 ASSESSMENT — PAIN - FUNCTIONAL ASSESSMENT: PAIN_FUNCTIONAL_ASSESSMENT: NONE - DENIES PAIN

## 2024-07-09 NOTE — ED PROVIDER NOTES
THE [facility]  EMERGENCY DEPARTMENT ENCOUNTER          PHYSICIAN ASSISTANT NOTE       Date of evaluation: 7/9/2024    Chief Complaint     Emesis (Squad called for altered mental status and hypotension. Upon squad arrival, all VSS per report. During transport pt became nauseous and had 2 episodes of vomiting. Pt received IM zofran during transport. Denies any complaints at this time. )      History of Present Illness     Dae Girard is a 73 y.o. male with a past medical history of hypertension, type 2 diabetes, hyperlipidemia, ESRD on home hemodialysis who comes to the emergency department today with his home health nurse who reports that while he was approximately 2 hours into his hemodialysis today he began having low blood pressure in the 90s systolic with some nausea and brief moment of \"staring off into the distance\" which resolved a short time later.  They did call a squad however and in route the patient became nauseous and had 1 episode of vomiting.  He was administered Zofran by paramedics with resolution of nausea and vomiting.  Here he is asymptomatic and denies nausea, fevers, chest pain, shortness of breath, abdominal pain, bowel or bladder changes.  His nurses here and denies any witnessed neurologic changes including facial asymmetry, slurred speech.     Review of Systems     Review of Systems   Constitutional:  Negative for chills and fever.   Respiratory:  Negative for cough, chest tightness and shortness of breath.    Cardiovascular:  Negative for chest pain.   Gastrointestinal:  Positive for nausea and vomiting. Negative for abdominal pain and diarrhea.   Genitourinary:  Negative for dysuria.   Neurological:  Negative for dizziness, light-headedness and headaches.       Past Medical, Surgical, Family, and Social History     He has a past medical history of Fainted, High cholesterol, Hypertension, Hypothyroid, Kidney disease, and Type II or unspecified type diabetes mellitus without

## 2024-07-09 NOTE — ED PROVIDER NOTES
ED Attending Attestation Note     Date of evaluation: 2/23/2023    This patient was seen by the advance practice provider.  I have seen and examined the patient, agree with the workup, evaluation, management and diagnosis. The care plan has been discussed.  I have reviewed the ECG and concur with the ALESSANDRA's interpretation.      Patient History     Chief Complaint: Emesis (Squad called for altered mental status and hypotension. Upon squad arrival, all VSS per report. During transport pt became nauseous and had 2 episodes of vomiting. Pt received IM zofran during transport. Denies any complaints at this time. )      HPI: Dae Girard is a 73 y.o. who presents to the Emergency Department with complaints of an episode of hypotension and loss of consciousness, which occurred during home dialysis today, and was noted by his dialysis nurse.  He has since entirely recovered, and states that he feels at his baseline health..      He has a past medical history of Fainted, High cholesterol, Hypertension, Hypothyroid, Kidney disease, and Type II or unspecified type diabetes mellitus without mention of complication, not stated as uncontrolled.    He has a past surgical history that includes Knee arthroscopy (Left); Hand surgery (Right); Cardiac surgery; Dialysis fistula creation (Right); other surgical history; and Colonoscopy.    family history includes Cancer in his mother; Heart Disease in his father and sister; Kidney Disease in his sister; Stroke in his father.    He reports that he has never smoked. He has never used smokeless tobacco. He reports that he does not currently use alcohol. He reports that he does not use drugs.    Pertinent Physical Exam Findings:   Very well-appearing patient, pleasantly conversational, in no acute distress            Lian Chino MD  07/12/24 [id number]

## 2024-07-09 NOTE — DISCHARGE INSTRUCTIONS
You do not need to finish your dialysis today.  Resume per your normal schedule.    Return to the emergency department with return of symptoms, chest pain, shortness of breath, abdominal pain, or other concerning symptoms.

## 2024-08-25 ENCOUNTER — HOSPITAL ENCOUNTER (INPATIENT)
Age: 74
LOS: 3 days | Discharge: HOME | End: 2024-08-29
Attending: EMERGENCY MEDICINE | Admitting: INTERNAL MEDICINE
Payer: MEDICARE

## 2024-08-25 DIAGNOSIS — I63.9 CEREBROVASCULAR ACCIDENT (CVA), UNSPECIFIED MECHANISM (HCC): Primary | ICD-10-CM

## 2024-08-25 DIAGNOSIS — I48.0 PAROXYSMAL ATRIAL FIBRILLATION (HCC): ICD-10-CM

## 2024-08-25 PROCEDURE — 99285 EMERGENCY DEPT VISIT HI MDM: CPT

## 2024-08-25 ASSESSMENT — PAIN - FUNCTIONAL ASSESSMENT: PAIN_FUNCTIONAL_ASSESSMENT: NONE - DENIES PAIN

## 2024-08-26 ENCOUNTER — APPOINTMENT (OUTPATIENT)
Dept: CT IMAGING | Age: 74
End: 2024-08-26
Payer: MEDICARE

## 2024-08-26 ENCOUNTER — APPOINTMENT (OUTPATIENT)
Dept: MRI IMAGING | Age: 74
End: 2024-08-26
Payer: MEDICARE

## 2024-08-26 ENCOUNTER — APPOINTMENT (OUTPATIENT)
Dept: GENERAL RADIOLOGY | Age: 74
End: 2024-08-26
Payer: MEDICARE

## 2024-08-26 PROBLEM — I63.9 CEREBROVASCULAR ACCIDENT (CVA) (HCC): Status: ACTIVE | Noted: 2024-08-26

## 2024-08-26 PROBLEM — I63.9 STROKE OF UNKNOWN ETIOLOGY (HCC): Status: ACTIVE | Noted: 2024-08-26

## 2024-08-26 PROBLEM — N18.32 STAGE 3B CHRONIC KIDNEY DISEASE (HCC): Status: ACTIVE | Noted: 2024-08-26

## 2024-08-26 LAB
ALBUMIN SERPL-MCNC: 3.9 G/DL (ref 3.4–5)
ALBUMIN SERPL-MCNC: 4.4 G/DL (ref 3.4–5)
ALBUMIN/GLOB SERPL: 1.4 {RATIO} (ref 1.1–2.2)
ALP SERPL-CCNC: 59 U/L (ref 40–129)
ALT SERPL-CCNC: 24 U/L (ref 10–40)
ANION GAP SERPL CALCULATED.3IONS-SCNC: 13 MMOL/L (ref 3–16)
ANION GAP SERPL CALCULATED.3IONS-SCNC: 17 MMOL/L (ref 3–16)
APTT BLD: 30.6 SEC (ref 22.1–36.4)
AST SERPL-CCNC: 26 U/L (ref 15–37)
BASOPHILS # BLD: 0.1 K/UL (ref 0–0.2)
BASOPHILS NFR BLD: 1.1 %
BILIRUB SERPL-MCNC: 0.9 MG/DL (ref 0–1)
BUN SERPL-MCNC: 26 MG/DL (ref 7–20)
BUN SERPL-MCNC: 29 MG/DL (ref 7–20)
CALCIUM SERPL-MCNC: 10 MG/DL (ref 8.3–10.6)
CALCIUM SERPL-MCNC: 8.9 MG/DL (ref 8.3–10.6)
CHLORIDE SERPL-SCNC: 90 MMOL/L (ref 99–110)
CHLORIDE SERPL-SCNC: 93 MMOL/L (ref 99–110)
CO2 SERPL-SCNC: 27 MMOL/L (ref 21–32)
CO2 SERPL-SCNC: 29 MMOL/L (ref 21–32)
CREAT SERPL-MCNC: 2.9 MG/DL (ref 0.8–1.3)
CREAT SERPL-MCNC: 3.2 MG/DL (ref 0.8–1.3)
DEPRECATED RDW RBC AUTO: 14.9 % (ref 12.4–15.4)
DEPRECATED RDW RBC AUTO: 15.1 % (ref 12.4–15.4)
EKG ATRIAL RATE: 76 BPM
EKG DIAGNOSIS: NORMAL
EKG P AXIS: 64 DEGREES
EKG P-R INTERVAL: 182 MS
EKG Q-T INTERVAL: 460 MS
EKG QRS DURATION: 100 MS
EKG QTC CALCULATION (BAZETT): 517 MS
EKG R AXIS: 93 DEGREES
EKG T AXIS: -10 DEGREES
EKG VENTRICULAR RATE: 76 BPM
EOSINOPHIL # BLD: 0.1 K/UL (ref 0–0.6)
EOSINOPHIL NFR BLD: 1.3 %
GFR SERPLBLD CREATININE-BSD FMLA CKD-EPI: 19 ML/MIN/{1.73_M2}
GFR SERPLBLD CREATININE-BSD FMLA CKD-EPI: 22 ML/MIN/{1.73_M2}
GLUCOSE BLD-MCNC: 100 MG/DL (ref 70–99)
GLUCOSE BLD-MCNC: 109 MG/DL (ref 70–99)
GLUCOSE BLD-MCNC: 157 MG/DL (ref 70–99)
GLUCOSE BLD-MCNC: 91 MG/DL (ref 70–99)
GLUCOSE SERPL-MCNC: 153 MG/DL (ref 70–99)
GLUCOSE SERPL-MCNC: 88 MG/DL (ref 70–99)
HCT VFR BLD AUTO: 41 % (ref 40.5–52.5)
HCT VFR BLD AUTO: 45.3 % (ref 40.5–52.5)
HGB BLD-MCNC: 13.8 G/DL (ref 13.5–17.5)
HGB BLD-MCNC: 14.9 G/DL (ref 13.5–17.5)
INR PPP: 1.04 (ref 0.85–1.15)
LYMPHOCYTES # BLD: 1.7 K/UL (ref 1–5.1)
LYMPHOCYTES NFR BLD: 20.3 %
MAGNESIUM SERPL-MCNC: 2.3 MG/DL (ref 1.8–2.4)
MAGNESIUM SERPL-MCNC: 2.4 MG/DL (ref 1.8–2.4)
MCH RBC QN AUTO: 30 PG (ref 26–34)
MCH RBC QN AUTO: 30.4 PG (ref 26–34)
MCHC RBC AUTO-ENTMCNC: 32.8 G/DL (ref 31–36)
MCHC RBC AUTO-ENTMCNC: 33.5 G/DL (ref 31–36)
MCV RBC AUTO: 90.6 FL (ref 80–100)
MCV RBC AUTO: 91.3 FL (ref 80–100)
MONOCYTES # BLD: 1 K/UL (ref 0–1.3)
MONOCYTES NFR BLD: 12.2 %
NEUTROPHILS # BLD: 5.3 K/UL (ref 1.7–7.7)
NEUTROPHILS NFR BLD: 65.1 %
PERFORMED ON: ABNORMAL
PERFORMED ON: NORMAL
PHOSPHATE SERPL-MCNC: 2.5 MG/DL (ref 2.5–4.9)
PLATELET # BLD AUTO: 197 K/UL (ref 135–450)
PLATELET # BLD AUTO: 209 K/UL (ref 135–450)
PMV BLD AUTO: 6.4 FL (ref 5–10.5)
PMV BLD AUTO: 6.7 FL (ref 5–10.5)
POTASSIUM SERPL-SCNC: 3.2 MMOL/L (ref 3.5–5.1)
POTASSIUM SERPL-SCNC: 3.3 MMOL/L (ref 3.5–5.1)
PROT SERPL-MCNC: 7.5 G/DL (ref 6.4–8.2)
PROTHROMBIN TIME: 13.8 SEC (ref 11.9–14.9)
RBC # BLD AUTO: 4.53 M/UL (ref 4.2–5.9)
RBC # BLD AUTO: 4.96 M/UL (ref 4.2–5.9)
SODIUM SERPL-SCNC: 134 MMOL/L (ref 136–145)
SODIUM SERPL-SCNC: 135 MMOL/L (ref 136–145)
TROPONIN, HIGH SENSITIVITY: 55 NG/L (ref 0–22)
TSH SERPL DL<=0.005 MIU/L-ACNC: 3.29 UIU/ML (ref 0.27–4.2)
WBC # BLD AUTO: 8.1 K/UL (ref 4–11)
WBC # BLD AUTO: 8.9 K/UL (ref 4–11)

## 2024-08-26 PROCEDURE — 36415 COLL VENOUS BLD VENIPUNCTURE: CPT

## 2024-08-26 PROCEDURE — 85027 COMPLETE CBC AUTOMATED: CPT

## 2024-08-26 PROCEDURE — 97166 OT EVAL MOD COMPLEX 45 MIN: CPT

## 2024-08-26 PROCEDURE — 84484 ASSAY OF TROPONIN QUANT: CPT

## 2024-08-26 PROCEDURE — 83735 ASSAY OF MAGNESIUM: CPT

## 2024-08-26 PROCEDURE — 70551 MRI BRAIN STEM W/O DYE: CPT

## 2024-08-26 PROCEDURE — 97535 SELF CARE MNGMENT TRAINING: CPT

## 2024-08-26 PROCEDURE — 99223 1ST HOSP IP/OBS HIGH 75: CPT | Performed by: INTERNAL MEDICINE

## 2024-08-26 PROCEDURE — 6360000002 HC RX W HCPCS

## 2024-08-26 PROCEDURE — 99291 CRITICAL CARE FIRST HOUR: CPT | Performed by: STUDENT IN AN ORGANIZED HEALTH CARE EDUCATION/TRAINING PROGRAM

## 2024-08-26 PROCEDURE — 84443 ASSAY THYROID STIM HORMONE: CPT

## 2024-08-26 PROCEDURE — 97163 PT EVAL HIGH COMPLEX 45 MIN: CPT

## 2024-08-26 PROCEDURE — 85025 COMPLETE CBC W/AUTO DIFF WBC: CPT

## 2024-08-26 PROCEDURE — 2700000000 HC OXYGEN THERAPY PER DAY

## 2024-08-26 PROCEDURE — 71045 X-RAY EXAM CHEST 1 VIEW: CPT

## 2024-08-26 PROCEDURE — 70450 CT HEAD/BRAIN W/O DYE: CPT

## 2024-08-26 PROCEDURE — 93005 ELECTROCARDIOGRAM TRACING: CPT | Performed by: EMERGENCY MEDICINE

## 2024-08-26 PROCEDURE — APPNB180 APP NON BILLABLE TIME > 60 MINS

## 2024-08-26 PROCEDURE — 96374 THER/PROPH/DIAG INJ IV PUSH: CPT

## 2024-08-26 PROCEDURE — 94150 VITAL CAPACITY TEST: CPT

## 2024-08-26 PROCEDURE — 97116 GAIT TRAINING THERAPY: CPT

## 2024-08-26 PROCEDURE — 92610 EVALUATE SWALLOWING FUNCTION: CPT

## 2024-08-26 PROCEDURE — 85610 PROTHROMBIN TIME: CPT

## 2024-08-26 PROCEDURE — 2000000000 HC ICU R&B

## 2024-08-26 PROCEDURE — 80053 COMPREHEN METABOLIC PANEL: CPT

## 2024-08-26 PROCEDURE — 94761 N-INVAS EAR/PLS OXIMETRY MLT: CPT

## 2024-08-26 PROCEDURE — 6370000000 HC RX 637 (ALT 250 FOR IP)

## 2024-08-26 PROCEDURE — 6360000004 HC RX CONTRAST MEDICATION: Performed by: STUDENT IN AN ORGANIZED HEALTH CARE EDUCATION/TRAINING PROGRAM

## 2024-08-26 PROCEDURE — 97530 THERAPEUTIC ACTIVITIES: CPT

## 2024-08-26 PROCEDURE — 92523 SPEECH SOUND LANG COMPREHEN: CPT

## 2024-08-26 PROCEDURE — 85730 THROMBOPLASTIN TIME PARTIAL: CPT

## 2024-08-26 PROCEDURE — 97110 THERAPEUTIC EXERCISES: CPT

## 2024-08-26 PROCEDURE — 3E03317 INTRODUCTION OF OTHER THROMBOLYTIC INTO PERIPHERAL VEIN, PERCUTANEOUS APPROACH: ICD-10-PCS | Performed by: EMERGENCY MEDICINE

## 2024-08-26 PROCEDURE — 6360000004 HC RX CONTRAST MEDICATION: Performed by: EMERGENCY MEDICINE

## 2024-08-26 PROCEDURE — 51798 US URINE CAPACITY MEASURE: CPT

## 2024-08-26 PROCEDURE — 2580000003 HC RX 258

## 2024-08-26 PROCEDURE — 6360000002 HC RX W HCPCS: Performed by: EMERGENCY MEDICINE

## 2024-08-26 PROCEDURE — 2580000003 HC RX 258: Performed by: EMERGENCY MEDICINE

## 2024-08-26 PROCEDURE — A9576 INJ PROHANCE MULTIPACK: HCPCS | Performed by: STUDENT IN AN ORGANIZED HEALTH CARE EDUCATION/TRAINING PROGRAM

## 2024-08-26 PROCEDURE — 70549 MR ANGIOGRAPH NECK W/O&W/DYE: CPT

## 2024-08-26 PROCEDURE — 51701 INSERT BLADDER CATHETER: CPT

## 2024-08-26 PROCEDURE — 70544 MR ANGIOGRAPHY HEAD W/O DYE: CPT

## 2024-08-26 PROCEDURE — 70498 CT ANGIOGRAPHY NECK: CPT

## 2024-08-26 RX ORDER — RANOLAZINE 500 MG/1
500 TABLET, EXTENDED RELEASE ORAL 2 TIMES DAILY
Status: DISCONTINUED | OUTPATIENT
Start: 2024-08-26 | End: 2024-08-29 | Stop reason: HOSPADM

## 2024-08-26 RX ORDER — PANTOPRAZOLE SODIUM 40 MG/1
40 TABLET, DELAYED RELEASE ORAL DAILY
Status: CANCELLED | OUTPATIENT
Start: 2024-08-26

## 2024-08-26 RX ORDER — GLUCAGON 1 MG/ML
1 KIT INJECTION PRN
Status: DISCONTINUED | OUTPATIENT
Start: 2024-08-26 | End: 2024-08-29 | Stop reason: HOSPADM

## 2024-08-26 RX ORDER — SODIUM CHLORIDE 0.9 % (FLUSH) 0.9 %
5-40 SYRINGE (ML) INJECTION PRN
Status: DISCONTINUED | OUTPATIENT
Start: 2024-08-26 | End: 2024-08-29 | Stop reason: HOSPADM

## 2024-08-26 RX ORDER — ASPIRIN 300 MG/1
300 SUPPOSITORY RECTAL DAILY
Status: DISCONTINUED | OUTPATIENT
Start: 2024-08-27 | End: 2024-08-27

## 2024-08-26 RX ORDER — AMLODIPINE BESYLATE 5 MG/1
5 TABLET ORAL 2 TIMES DAILY
Status: DISCONTINUED | OUTPATIENT
Start: 2024-08-26 | End: 2024-08-29 | Stop reason: HOSPADM

## 2024-08-26 RX ORDER — ONDANSETRON 4 MG/1
4 TABLET, ORALLY DISINTEGRATING ORAL EVERY 8 HOURS PRN
Status: DISCONTINUED | OUTPATIENT
Start: 2024-08-26 | End: 2024-08-29 | Stop reason: HOSPADM

## 2024-08-26 RX ORDER — ATORVASTATIN CALCIUM 80 MG/1
80 TABLET, FILM COATED ORAL NIGHTLY
Status: DISCONTINUED | OUTPATIENT
Start: 2024-08-26 | End: 2024-08-29 | Stop reason: HOSPADM

## 2024-08-26 RX ORDER — SODIUM CHLORIDE 0.9 % (FLUSH) 0.9 %
10 SYRINGE (ML) INJECTION ONCE
Status: COMPLETED | OUTPATIENT
Start: 2024-08-26 | End: 2024-08-26

## 2024-08-26 RX ORDER — CALCIUM ACETATE 667 MG/1
1 TABLET ORAL 3 TIMES DAILY
COMMUNITY

## 2024-08-26 RX ORDER — LABETALOL HYDROCHLORIDE 5 MG/ML
10 INJECTION, SOLUTION INTRAVENOUS EVERY 10 MIN PRN
Status: DISCONTINUED | OUTPATIENT
Start: 2024-08-26 | End: 2024-08-26

## 2024-08-26 RX ORDER — DEXTROSE MONOHYDRATE 100 MG/ML
INJECTION, SOLUTION INTRAVENOUS CONTINUOUS PRN
Status: DISCONTINUED | OUTPATIENT
Start: 2024-08-26 | End: 2024-08-29 | Stop reason: HOSPADM

## 2024-08-26 RX ORDER — TAMSULOSIN HYDROCHLORIDE 0.4 MG/1
0.4 CAPSULE ORAL DAILY
Status: DISCONTINUED | OUTPATIENT
Start: 2024-08-26 | End: 2024-08-29 | Stop reason: HOSPADM

## 2024-08-26 RX ORDER — SODIUM CHLORIDE 0.9 % (FLUSH) 0.9 %
5-40 SYRINGE (ML) INJECTION EVERY 12 HOURS SCHEDULED
Status: DISCONTINUED | OUTPATIENT
Start: 2024-08-26 | End: 2024-08-29 | Stop reason: HOSPADM

## 2024-08-26 RX ORDER — LORAZEPAM 2 MG/ML
INJECTION INTRAMUSCULAR
Status: COMPLETED
Start: 2024-08-26 | End: 2024-08-26

## 2024-08-26 RX ORDER — ONDANSETRON 2 MG/ML
4 INJECTION INTRAMUSCULAR; INTRAVENOUS EVERY 6 HOURS PRN
Status: DISCONTINUED | OUTPATIENT
Start: 2024-08-26 | End: 2024-08-29 | Stop reason: HOSPADM

## 2024-08-26 RX ORDER — ROSUVASTATIN CALCIUM 10 MG/1
10 TABLET, COATED ORAL DAILY
Status: ON HOLD | COMMUNITY
End: 2024-08-28 | Stop reason: HOSPADM

## 2024-08-26 RX ORDER — LABETALOL HYDROCHLORIDE 5 MG/ML
10 INJECTION, SOLUTION INTRAVENOUS ONCE
Status: DISCONTINUED | OUTPATIENT
Start: 2024-08-26 | End: 2024-08-29 | Stop reason: HOSPADM

## 2024-08-26 RX ORDER — LABETALOL HYDROCHLORIDE 5 MG/ML
10 INJECTION, SOLUTION INTRAVENOUS EVERY 10 MIN PRN
Status: COMPLETED | OUTPATIENT
Start: 2024-08-26 | End: 2024-08-26

## 2024-08-26 RX ORDER — ROSUVASTATIN CALCIUM 20 MG/1
40 TABLET, COATED ORAL NIGHTLY
Status: DISCONTINUED | OUTPATIENT
Start: 2024-08-26 | End: 2024-08-26

## 2024-08-26 RX ORDER — IOPAMIDOL 755 MG/ML
75 INJECTION, SOLUTION INTRAVASCULAR
Status: COMPLETED | OUTPATIENT
Start: 2024-08-26 | End: 2024-08-26

## 2024-08-26 RX ORDER — HYDRALAZINE HYDROCHLORIDE 20 MG/ML
10 INJECTION INTRAMUSCULAR; INTRAVENOUS EVERY 6 HOURS PRN
Status: DISCONTINUED | OUTPATIENT
Start: 2024-08-26 | End: 2024-08-29 | Stop reason: HOSPADM

## 2024-08-26 RX ORDER — LORAZEPAM 2 MG/ML
INJECTION INTRAMUSCULAR
Status: DISPENSED
Start: 2024-08-26 | End: 2024-08-27

## 2024-08-26 RX ORDER — LORAZEPAM 2 MG/ML
1 INJECTION INTRAMUSCULAR
Status: DISCONTINUED | OUTPATIENT
Start: 2024-08-26 | End: 2024-08-27

## 2024-08-26 RX ORDER — LOSARTAN POTASSIUM 100 MG/1
100 TABLET ORAL DAILY
Status: DISCONTINUED | OUTPATIENT
Start: 2024-08-26 | End: 2024-08-27

## 2024-08-26 RX ORDER — POLYETHYLENE GLYCOL 3350 17 G/17G
17 POWDER, FOR SOLUTION ORAL DAILY PRN
Status: DISCONTINUED | OUTPATIENT
Start: 2024-08-26 | End: 2024-08-29 | Stop reason: HOSPADM

## 2024-08-26 RX ORDER — SODIUM CHLORIDE 9 MG/ML
INJECTION, SOLUTION INTRAVENOUS PRN
Status: DISCONTINUED | OUTPATIENT
Start: 2024-08-26 | End: 2024-08-29 | Stop reason: HOSPADM

## 2024-08-26 RX ORDER — ASPIRIN 81 MG/1
81 TABLET, CHEWABLE ORAL DAILY
Status: DISCONTINUED | OUTPATIENT
Start: 2024-08-27 | End: 2024-08-27

## 2024-08-26 RX ORDER — LORAZEPAM 2 MG/ML
1 INJECTION INTRAMUSCULAR
Status: COMPLETED | OUTPATIENT
Start: 2024-08-26 | End: 2024-08-26

## 2024-08-26 RX ORDER — LEVOTHYROXINE SODIUM 50 UG/1
50 TABLET ORAL DAILY
Status: DISCONTINUED | OUTPATIENT
Start: 2024-08-26 | End: 2024-08-29 | Stop reason: HOSPADM

## 2024-08-26 RX ORDER — OLANZAPINE 5 MG/1
2.5 TABLET ORAL NIGHTLY
Status: DISCONTINUED | OUTPATIENT
Start: 2024-08-26 | End: 2024-08-29 | Stop reason: HOSPADM

## 2024-08-26 RX ADMIN — IOPAMIDOL 75 ML: 755 INJECTION, SOLUTION INTRAVENOUS at 00:39

## 2024-08-26 RX ADMIN — SODIUM CHLORIDE, PRESERVATIVE FREE 10 ML: 5 INJECTION INTRAVENOUS at 01:28

## 2024-08-26 RX ADMIN — LORAZEPAM 1 MG: 2 INJECTION INTRAMUSCULAR at 23:01

## 2024-08-26 RX ADMIN — SODIUM CHLORIDE, PRESERVATIVE FREE 10 ML: 5 INJECTION INTRAVENOUS at 09:24

## 2024-08-26 RX ADMIN — LABETALOL HYDROCHLORIDE 10 MG: 5 INJECTION, SOLUTION INTRAVENOUS at 06:16

## 2024-08-26 RX ADMIN — RANOLAZINE 500 MG: 500 TABLET, EXTENDED RELEASE ORAL at 20:37

## 2024-08-26 RX ADMIN — LORAZEPAM 1 MG: 2 INJECTION INTRAMUSCULAR; INTRAVENOUS at 23:01

## 2024-08-26 RX ADMIN — RANOLAZINE 500 MG: 500 TABLET, EXTENDED RELEASE ORAL at 09:27

## 2024-08-26 RX ADMIN — Medication 18 MG: at 01:25

## 2024-08-26 RX ADMIN — LABETALOL HYDROCHLORIDE 10 MG: 5 INJECTION, SOLUTION INTRAVENOUS at 20:51

## 2024-08-26 RX ADMIN — ATORVASTATIN CALCIUM 80 MG: 80 TABLET, FILM COATED ORAL at 20:37

## 2024-08-26 RX ADMIN — LABETALOL HYDROCHLORIDE 10 MG: 5 INJECTION, SOLUTION INTRAVENOUS at 08:17

## 2024-08-26 RX ADMIN — LEVOTHYROXINE SODIUM 50 MCG: 50 TABLET ORAL at 09:27

## 2024-08-26 RX ADMIN — TAMSULOSIN HYDROCHLORIDE 0.4 MG: 0.4 CAPSULE ORAL at 05:00

## 2024-08-26 RX ADMIN — GADOTERIDOL 16 ML: 279.3 INJECTION, SOLUTION INTRAVENOUS at 23:49

## 2024-08-26 RX ADMIN — LABETALOL HYDROCHLORIDE 10 MG: 5 INJECTION, SOLUTION INTRAVENOUS at 10:34

## 2024-08-26 RX ADMIN — HYDRALAZINE HYDROCHLORIDE 10 MG: 20 INJECTION INTRAMUSCULAR; INTRAVENOUS at 22:05

## 2024-08-26 ASSESSMENT — ENCOUNTER SYMPTOMS
ABDOMINAL PAIN: 0
CONSTIPATION: 0
VOMITING: 0
BACK PAIN: 0
NAUSEA: 0
SHORTNESS OF BREATH: 0

## 2024-08-26 NOTE — PROGRESS NOTES
Speech Language Pathology  Facility/Department:[facility] ICU  Dysphagia/speech-language Evaluations    Name: Dae Girard  : 1950  MRN: [id number]                                                     Patient Diagnosis(es):   Patient Active Problem List    Diagnosis Date Noted    Sepsis (HCC) 2023    Stroke of unknown etiology (HCC) 2024    COVID-19 2024    Electrolyte imbalance 2023    Hypertension 2023    End stage chronic kidney disease (HCC) 2023    Type II or 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, not stated as uncontrolled(250.60) 2011    Nephritis and nephropathy, not specified as acute or chronic, with other specified pathological lesion in kidney, in diseases classified elsewhere 2011    Type II or 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, uncontrolled(250.62) 2011    Diabetic polyneuropathy ([facility]) 2011    Diabetes mellitus with background retinopathy ([facility]) 2011    Other and unspecified hyperlipidemia 2011     Past Medical History:   Diagnosis Date    Fainted 2017    High cholesterol     Hypertension     Hypothyroid     Kidney disease     dialysis: Right arm fistula; states will be doing dialysis at home    Type II or unspecified type diabetes mellitus without mention of complication, not stated as uncontrolled      Past Surgical History:   Procedure Laterality Date    CARDIAC SURGERY      2022    COLONOSCOPY      last colonscopy was     DIALYSIS FISTULA CREATION Right     right arm fistula Dec. 2020    HAND SURGERY Right     carpal tunnel    KNEE ARTHROSCOPY Left     OTHER SURGICAL HISTORY      heart stents 2023     Reason for Referral:  Dae Girard  was referred for a Speech Therapy evaluation to assess swallow function and/or communication.    History of Present Illness  Per MD notes:  \" Mr. Dae Girard is a 73 y.o. male with a medical hx significant

## 2024-08-26 NOTE — PLAN OF CARE
Problem: Discharge Planning  Goal: Discharge to home or other facility with appropriate resources  8/26/2024 1237 by Sydney Mccullough RN  Outcome: Progressing     Problem: Skin/Tissue Integrity  Goal: Absence of new skin breakdown  Description: 1.  Monitor for areas of redness and/or skin breakdown  2.  Assess vascular access sites hourly  3.  Every 4-6 hours minimum:  Change oxygen saturation probe site  4.  Every 4-6 hours:  If on nasal continuous positive airway pressure, respiratory therapy assess nares and determine need for appliance change or resting period.  8/26/2024 1237 by Sydney Mccullough RN  Outcome: Progressing     Problem: Safety - Adult  Goal: Free from fall injury  8/26/2024 1237 by Sydney Mccullough RN  Outcome: Progressing    Problem: ABCDS Injury Assessment  Goal: Absence of physical injury  8/26/2024 1237 by Sydney Mccullough RN  Outcome: Progressing    Problem: Neurosensory - Adult  Goal: Achieves stable or improved neurological status  8/26/2024 1237 by Sydney Mccullough RN  Outcome: Progressing     Problem: Respiratory - Adult  Goal: Achieves optimal ventilation and oxygenation  8/26/2024 1237 by Sydney Mccullough RN  Outcome: Progressing     Problem: Cardiovascular - Adult  Goal: Maintains optimal cardiac output and hemodynamic stability  8/26/2024 1237 by Sydney Mccullough RN  Outcome: Progressing    Problem: Skin/Tissue Integrity - Adult  Goal: Skin integrity remains intact  8/26/2024 1237 by Sydney Mccullough RN  Outcome: Progressing    Problem: Musculoskeletal - Adult  Goal: Return mobility to safest level of function  8/26/2024 1237 by Sydney Mccullough RN  Outcome: Progressing     Problem: Chronic Conditions and Co-morbidities  Goal: Patient's chronic conditions and co-morbidity symptoms are monitored and maintained or improved  Outcome: Progressing

## 2024-08-26 NOTE — PLAN OF CARE
Problem: SLP Adult - Impaired Swallowing  Goal: By Discharge: Advance to least restrictive diet without signs or symptoms of aspiration for planned discharge setting.  See evaluation for individualized goals.  Outcome: Progressing     Problem: SLP Adult - Impaired Communication  Goal: By Discharge: Demonstrates communication skills at highest level of function for planned discharge setting.  See evaluation for individualized goals.  Outcome: Progressing

## 2024-08-26 NOTE — PLAN OF CARE
Problem: Discharge Planning  Goal: Discharge to home or other facility with appropriate resources  Outcome: Progressing     Problem: Skin/Tissue Integrity  Goal: Absence of new skin breakdown  Description: 1.  Monitor for areas of redness and/or skin breakdown  2.  Assess vascular access sites hourly  3.  Every 4-6 hours minimum:  Change oxygen saturation probe site  4.  Every 4-6 hours:  If on nasal continuous positive airway pressure, respiratory therapy assess nares and determine need for appliance change or resting period.  Outcome: Progressing     Problem: Safety - Adult  Goal: Free from fall injury  Outcome: Progressing     Problem: ABCDS Injury Assessment  Goal: Absence of physical injury  Outcome: Progressing     Problem: Neurosensory - Adult  Goal: Achieves stable or improved neurological status  Outcome: Progressing  Goal: Absence of seizures  Outcome: Progressing  Goal: Remains free of injury related to seizures activity  Outcome: Progressing  Goal: Achieves maximal functionality and self care  Outcome: Progressing     Problem: Respiratory - Adult  Goal: Achieves optimal ventilation and oxygenation  Outcome: Progressing     Problem: Cardiovascular - Adult  Goal: Maintains optimal cardiac output and hemodynamic stability  Outcome: Progressing  Goal: Absence of cardiac dysrhythmias or at baseline  Outcome: Progressing     Problem: Musculoskeletal - Adult  Goal: Return mobility to safest level of function  Outcome: Progressing  Goal: Maintain proper alignment of affected body part  Outcome: Progressing  Goal: Return ADL status to a safe level of function  Outcome: Progressing

## 2024-08-26 NOTE — PROGRESS NOTES
SBP <180                          - PRN labetalol                           - If PRN med insufficient, the start continuous Nicardipine infusion.      Follow up / Discharge Recommendations:  -If no obvious source of stroke identified will need 30 day event monitor arranged at discharge  -Stroke Education at Discharge  -Follow up w/ Neurology in 3 months       SUBJECTIVE     Chart reviewed, events noted, pt seen & examined. No acute events overnight. Afebrile.     Review of Systems  No changes since pt last seen other than those noted above.    PFSH  No change since the original history and note.     OBJECTIVE     Patient Vitals for the past 24 hrs:   BP Temp Temp src Pulse Resp SpO2 Height Weight   08/26/24 1200 (!) 177/52 98.5 °F (36.9 °C) Oral 63 18 100 % -- --   08/26/24 1130 (!) 174/68 -- -- 66 20 100 % -- --   08/26/24 1100 (!) 159/58 -- -- 62 21 96 % -- --   08/26/24 1030 (!) 182/69 -- -- 68 19 100 % -- --   08/26/24 1000 (!) 183/72 -- -- 76 21 100 % -- --   08/26/24 0930 (!) 187/64 -- -- 69 19 100 % -- --   08/26/24 0905 -- -- -- 69 13 100 % -- --   08/26/24 0900 (!) 158/64 -- -- 70 13 100 % -- --   08/26/24 0830 (!) 150/72 -- -- 69 17 (!) 79 % -- --   08/26/24 0800 (!) 191/68 98.7 °F (37.1 °C) Oral 83 24 97 % -- --   08/26/24 0730 (!) 174/58 -- -- 69 23 100 % -- --   08/26/24 0715 (!) 179/53 -- -- 68 17 100 % -- --   08/26/24 0700 (!) 164/60 -- -- 72 19 100 % -- --   08/26/24 0645 (!) 189/60 -- -- 74 18 100 % -- --   08/26/24 0630 (!) 182/59 -- -- 65 28 100 % -- --   08/26/24 0615 (!) 160/107 -- -- 65 15 100 % -- --   08/26/24 0600 (!) 183/73 -- -- 88 20 100 % -- 74.2 kg (163 lb 9.3 oz)   08/26/24 0545 (!) 169/53 -- -- 66 17 100 % -- --   08/26/24 0530 (!) 180/56 -- -- 69 19 100 % -- --   08/26/24 0515 (!) 149/61 -- -- 74 16 100 % -- --   08/26/24 0500 (!) 144/53 -- -- 73 12 100 % -- --   08/26/24 0445 (!) 153/59 -- -- 75 15 100 % -- --   08/26/24 0430 (!) 160/52 -- -- 70 13 100 % -- --   08/26/24 0415 (!)  161/58 -- -- 75 14 100 % -- --   08/26/24 0400 (!) 167/62 -- -- 66 10 99 % -- --   08/26/24 0345 (!) 158/58 -- -- 69 21 95 % -- --   08/26/24 0330 (!) 169/62 -- -- 74 21 100 % -- --   08/26/24 0315 (!) 167/71 -- -- 99 22 100 % -- --   08/26/24 0300 (!) 180/67 -- -- 75 22 98 % -- --   08/26/24 0245 (!) 205/87 -- -- 80 16 99 % -- --   08/26/24 0230 (!) 164/70 98.6 °F (37 °C) Oral 82 30 -- -- --   08/26/24 0200 (!) 181/67 -- -- 77 24 -- -- --   08/26/24 0145 (!) 159/111 -- -- 73 24 99 % -- --   08/26/24 0130 (!) 182/75 -- -- 84 17 100 % -- --   08/26/24 0128 (!) 180/102 -- -- 71 21 99 % -- --   08/26/24 0125 (!) 177/69 -- -- -- -- -- -- --   08/26/24 0124 (!) 177/69 -- -- 71 21 -- -- --   08/26/24 0122 (!) 205/171 -- -- 72 -- -- -- --   08/26/24 0119 (!) 205/171 -- -- 79 26 -- -- --   08/26/24 0100 (!) 186/62 -- -- 75 20 100 % -- --   08/26/24 0032 (!) 144/68 -- -- 74 26 -- -- --   08/26/24 0030 (!) 144/68 -- -- 75 19 -- -- --   08/25/24 2352 (!) 179/140 98.6 °F (37 °C) Oral 80 18 100 % 1.727 m (5' 8\") 71.1 kg (156 lb 11.2 oz)       Neurological Exam (performed on 8/26/2024 at 1000):    General: Alert, no distress, well-nourished  Neurologic  Mental status:   Oriented person, place,   Disoriented to situation and time  Mild expressive aphasia with short responses with some paraphasic errors. No apparent dysarthria. Perseverating   Follows commands in all four limbs.         Cranial nerves:   CN2: Visual fields full w/o extinction on confrontational testing   CN 3,4,6: Pupils 3 mm > 2 mm equal and reactive to light, extraocular muscles intact  CN5: Facial sensation symmetric   CN7: Face symmetric  CN8: Hearing symmetric to spoken voice  CN9: Palate elevated symmetrically  CN11: Traps full strength on shoulder shrug  CN12: Tongue midline with protrusion     Motor Exam:  5/5 strength throughout    Imaging:  All reports below, not included in previous notes, personally reviewed & actual images reviewed where indicated.

## 2024-08-26 NOTE — PROGRESS NOTES
Current NIHSS 2    Nursing Core Measures for Stroke:   [x]   Education template documentation (STROKE/TIA). Please select only risk factors that are applicable to patient when selecting risk factors.  [x]   Care Plan template documentation (Physiologic Instability - Neurosensory). Selecting this will add care plan rows to the flowsheet under the Neuro section of Head to Toe.  [x]   Verified Swallow Screen completed prior to PO intake of food, drink, medications  [x]   VTE Prophylaxis: SCDs ordered/addressed; SCDs: On           (As a reminder, ASA, Plavix, and TPA/TNK are not VTE prophylaxis.)    Reviewed the Following Education with Patient and/or Family:   - Personalized risk factors for patient, along with changes, modifications that will help prevent stroke.  - Signs and Symptoms of Stroke: (Facial droop, weakness/numbness especially on one side, speech difficulty, sudden confusion, sudden loss of vision, sudden severe headache, sudden loss of balance or having difficulty walking, syncope, or seizure)  - How to activate EMS (911)   - Importance of Follow Up Appointments at Discharge   - Importance of Compliance with Medications Prescribed at Discharge  - Available community resources and stroke advocacy groups if needed    Patient and/or family member: education needs reinforcement.     Stroke Education booklet given to patient/family (or verified, if given already), which reviews above information. yes         Electronically signed by Divya Milan RN on 8/26/2024 at 7:38 AM

## 2024-08-26 NOTE — CONSULTS
Neurocritical Care Consult Note      Patient: Dae Girard MRN: [id number]    YOB: 1950  Age: 73 y.o.  Sex: male   Unit: [facility] ICU TOWER  Room/Bed: 4520/4520-01 Location: [facility]    Date of Consultation: 8/26/2024  Date of Admission: 8/25/2024 11:45 PM ( LOS: 0 days )  Primary Care Physician: Maik Godwin DO   Consult Requested By: Shira Becerril MD    Reason for Consult: Stroke with thrombolysis    IMPRESSION & RECOMMENDATIONS     IMPRESSION:  73 y.o. y/o male with history significant for ESRD (on at home HD 4x weekly), HTN, HLD, T2DM, hypothyroidism, CAD s/p CABG x 3 (2022) and ANNIKA (2023), CHF, and hallucinations presented to [facility] with chief complaint of aphasia that started while he was receiving home dialysis. NIH 3 on arrival to the ED. CT head unremarkable. CTA non-diagnostic due to motion artifact but per  stroke team note, no LVO. Patient's aphasia fluctuated int he ED, and had initially improved substantially, to where TNK was not advised. However, on repeat examination, his aphasia had acutely worsened, therefore he received TNK at 1:25 AM on 8/26/24. Not a candidate for EVT due to no LVO and low NIHSS. Patient has multiple risk vascular risk factors for stroke.      RECOMMENDATIONS:  Stroke Plan s/p IV Tenecteplase   Imaging / Labs:  -Need 24 hour follow up imaging (Safety Scan) - If MRI scheduled +/- 6 hours can discontinue head CT  -Obtain MRI of the brain w/o contrast to eval for stroke  - Will get MRA head and MRA neck to evaluate for intracranial athero given that the CTA was nondiagnostic  -Per Care Everywhere, patient had previous echo with bubble study completed on 1/30/2017, which showed no PFO or right-to-left atrial shunt.  Patient just had a complete echo completed on 8/21/2024, which showed his EF to be 54%, no wall motion abnormality.  -Check lipid panel and hemoglobin A1C if not already completed     Medications:  -High-intensity statin

## 2024-08-26 NOTE — PROGRESS NOTES
Current NIHSS 2    Nursing Core Measures for Stroke:   [x]   Education template documentation (STROKE/TIA). Please select only risk factors that are applicable to patient when selecting risk factors.  [x]   Care Plan template documentation (Physiologic Instability - Neurosensory). Selecting this will add care plan rows to the flowsheet under the Neuro section of Head to Toe.  [x]   Verified Swallow Screen completed prior to PO intake of food, drink, medications>          Please verify correct medication route prior to administration for intubated patients, patients who can not swallow or have alternative routes of intake (NG, OG, LA), etc  [x]   VTE Prophylaxis: SCDs ordered/addressed; SCDs: On           (As a reminder, ASA, Plavix, and TPA/TNK are not VTE prophylaxis.)    Reviewed the Following Education with Patient and/or Family:   - Personalized risk factors for patient, along with changes, modifications that will help prevent stroke.  - Signs and Symptoms of Stroke: (Facial droop, weakness/numbness especially on one side, speech difficulty, sudden confusion, sudden loss of vision, sudden severe headache, sudden loss of balance or having difficulty walking, syncope, or seizure)  - How to activate EMS (911)   - Importance of Follow Up Appointments at Discharge   - Importance of Compliance with Medications Prescribed at Discharge  - Available community resources and stroke advocacy groups if needed    Patient and/or family member: verbalized understanding.     Stroke Education booklet given to patient/family (or verified, if given already), which reviews above information. no         Electronically signed by Gurdeep Truong RN on 8/26/2024 at 5:15 AM

## 2024-08-26 NOTE — ED PROVIDER NOTES
THE [facility]  EMERGENCY DEPARTMENT ENCOUNTER          ATTENDING PHYSICIAN NOTE       Date of evaluation: 8/25/2024    Chief Complaint     Altered Mental Status (Patient to the ER with increased altered mental status following home dialysis. LKN 9pm)      History of Present Illness     Dae Girard is a 73 y.o. male who presents with altered mental status.  Patient has a history of ESRD on home HD.  Wife called EMS because she felt him to be altered this evening.  EMS initially reports to me that they believe that his last known well was when she started dialysis around 9 PM.  Ultimately the wife is able to come and tell me that he was normal at around 10:30 PM and then began to seem confused describing what sounds to be aphasia thereafter.  EMS was called.  He does not take any anticoagulant or antiplatelets at this time.  Does get heparin with dialysis.    Review of Systems     Review of Systems    Past Medical, Surgical, Family, and Social History     He has a past medical history of Fainted, High cholesterol, Hypertension, Hypothyroid, Kidney disease, and Type II or unspecified type diabetes mellitus without mention of complication, not stated as uncontrolled.  He has a past surgical history that includes Knee arthroscopy (Left); Hand surgery (Right); Cardiac surgery; Dialysis fistula creation (Right); other surgical history; and Colonoscopy.  His family history includes Cancer in his mother; Heart Disease in his father and sister; Kidney Disease in his sister; Stroke in his father.  He reports that he has never smoked. He has never used smokeless tobacco. He reports that he does not currently use alcohol. He reports that he does not use drugs.    Medications     Previous Medications    AMLODIPINE (NORVASC) 5 MG TABLET    Take 1 tablet by mouth 2 times daily    ASPIRIN EC 81 MG EC TABLET    Take 1 tablet by mouth daily    B COMPLEX-C-FOLIC ACID (DIALYVITE 800) 0.8 MG TABS    Take 1 tablet by mouth  angiographic    technique.   Individualized dose optimization technique was used in order to meet ALARA standards for radiation dose reduction.  In addition to vendor specific dose reduction algorithms, the dose reduction techniques vary based on the specific scanner    utilized but frequently include automated exposure control, adjustment of the mA and/or kV according to patient size, and use of iterative reconstruction technique.      IV contrast: 75 mL Isovue-370.      FINDINGS:      ANTERIOR CIRCULATION: The middle cerebral and anterior cerebral arteries are difficult to evaluate due to significant motion. The exam is essentially nondiagnostic although no definite cutoff or acute large vessel occlusion can be identified.      POSTERIOR CIRCULATION: The exam is significantly degraded by motion and accentuating nondiagnostic. There is no evidence of occlusion of the basilar artery.      INTRACRANIAL VENOUS SYSTEM: No evidence for intracranial venous thrombosis.         IMPRESSION:      1. Exam is essentially nondiagnostic due to motion but no definite large vessel occlusion is identified.        Electronically signed by Caterina Sevilla MD      CT HEAD WO CONTRAST   Final Result      No acute intracranial pathology        Atrophy and ischemic leukoencephalopathy.      Findings called to ER physician on 8/26/2024 at 12      Electronically signed by Caterina Sevilla MD          LABS:   Results for orders placed or performed during the hospital encounter of 08/25/24   CBC with Auto Differential   Result Value Ref Range    WBC 8.1 4.0 - 11.0 K/uL    RBC 4.96 4.20 - 5.90 M/uL    Hemoglobin 14.9 13.5 - 17.5 g/dL    Hematocrit 45.3 40.5 - 52.5 %    MCV 91.3 80.0 - 100.0 fL    MCH 30.0 26.0 - 34.0 pg    MCHC 32.8 31.0 - 36.0 g/dL    RDW 15.1 12.4 - 15.4 %    Platelets 209 135 - 450 K/uL    MPV 6.7 5.0 - 10.5 fL    Neutrophils % 65.1 %    Lymphocytes % 20.3 %    Monocytes % 12.2 %    Eosinophils % 1.3 %    Basophils %

## 2024-08-26 NOTE — PROGRESS NOTES
Nephrology Consult Note                                                                                                                                                                                                                                                                                                                                                               Office : [phone]     Fax :[phone]    Patient's Name: Dae Girard    8/26/2024    Reason for Consult:  ESRD   Requesting Physician:  Maik Godwin DO  Chief Complaint:    Chief Complaint   Patient presents with    Altered Mental Status     Patient to the ER with increased altered mental status following home dialysis. LKN 9pm       Assessment/Plan     #1 ESRD  #2 hypertension uncontrolled  #3  Anemia    #4 CVA  #5 electrolyte imbalance     Plan  Will hold dialysis today and will do dialysis tomorrow  We will be very liberal with the blood pressure control for next 48 hours  No EPO for now  MRI today   Labs in am         History of Present Ilness:    73 y.o. y/o male with history significant for ESRD (on at home HD 4x weekly), HTN, HLD, T2DM, hypothyroidism, CAD s/p CABG x 3 (2022) and ANNIKA (2023), CHF, and hallucinations presented to [facility] with chief complaint of aphasia that started while he was receiving home dialysis. NIH 3 on arrival to the ED. CT head unremarkable. CTA non-diagnostic due to motion artifact but per  stroke team note, no LVO. Patient's aphasia fluctuated int he ED, and had initially improved substantially, to where TNK was not advised. However, on repeat examination, his aphasia had acutely worsened, therefore he received TNK at 1:25 AM on 8/26/24. Not a candidate for EVT due to no LVO and low NIHSS. Patient has multiple risk vascular risk factors for stroke.       Interval hx     Past Medical History:   Diagnosis Date    Fainted 06/2017    High cholesterol     Hypertension     Hypothyroid     Kidney disease

## 2024-08-26 NOTE — ED NOTES
Notified lab of code stroke blood being sent up in yellow bag.      Julien Reyes, RN  08/26/24 0017

## 2024-08-26 NOTE — PROGRESS NOTES
ICU Progress Note    Admit Date: 8/25/2024  Day: 1  Vent Day: None  IV Access:Peripheral  IV Fluids:None  Vasopressors:None                Antibiotics: None  Diet: Diet NPO    CC: Speaking difficulty    Interval history: [facility]. Patient seen and examined at bedside this morning. Aox3 (except time), conversing well     HD held, planned for tomorrow    Awaiting stability scan and/ MRI wo, MRA      HPI:   \"Mr. Dae Girard is a 73 y.o. male with a medical hx significant for ESRD (on at-home HD 4x weekly), hypothyroidism, HTN, DM2, CAD (s/p CABG 2022, ANNIKA 2023) multiple falls who presented from home to the ED on 8/25 via EMS with difficulty speaking.  His wife Jessica was helping administer hemodialysis at home and the patient became confused, was not able to see a who he was or where he was, and did not was unable to coherently.  She states he was using the wrong words in the wrong ways like a word salad, but denies any slurring.  Onset of symptoms was around 2230 last night.  Sharon called EMS, who is very familiar with the patient having responded to multiple 911 calls to their house for multiple falls over the past year or so.  They recognized he was not normal, usually is able to state clearly what is wrong and what he needs but noted he was able to communicate.  Jessica denies any facial droop, focal weakness, or apparent vision issues.  No prior history of similar symptoms.  She denies any report of chest pain, palpitations, or lightheadedness associated with this presentation.  Patient has not complained of any pain, however the wife does note that the patient has had some abdominal discomfort and indigestion.  He is able to understand and follow commands, along with responding yes or no clearly to questions.     Etiology of the patient's recent falls seem to be multifactorial, including lightheadedness associated with low blood pressure, stumbling over his feet, and radiculopathy associated with lumbar fracture,

## 2024-08-26 NOTE — PROGRESS NOTES
Clinical Pharmacy Consult Note  Medication History     Admit Date: 8/25/2024    List of of current medications patient is taking is complete. Home Medication list in EPIC updated to reflect changes noted below.    Source of information: Dispense report & [facility] ([phone])    Patient's home pharmacy: [facility]     Changes made to medication list:     Medications removed: (include reason, ex: therapy completed, patient no longer taking, etc.)  Amlodipine 5 mg tablets  Dulaglutide 4.5 mg/0.5 mL SQ injection  Olanzapine 2.5 mg tablets  Glycolax powder  Rosuvastatin 20 mg tablets  Tamsulosin 0.4 mg capsules  Sevelamar 800 mg tablets  Medications added:   Rosuvastatin 10 mg tablets  Dulaglutide 3 mg/0.5 mL SQ injection  Calcium acetate 667 mg tablets  Medication doses adjusted:   None  Other notes:   Attempt to contact alternative contact but was unsuccessful  Per [facility]...  Amlodipine 5mg BID was last filled in 04/2024 x 30 days supply  Finasteride 5mg QD was last filled in 04/2024 x 90 days supply  Vascepa 1g QID was last filled in 04/2024 x 90 days supply  Patient ran out of losartan 100mg QD so used an old prescription of Losartan 50mg tablets and just took 2 tablets  Tamsulosin 0.4 mg QD was last filled in 04/2024 x 90 days supply    Please call with any questions.  Ralph Ricci, PharmD  PGY1 Pharmacy Resident   Wireless: [id number]  08/26/24

## 2024-08-26 NOTE — PROGRESS NOTES
Patient is alert, oriented to person and place.  Aphasia and confusion has progressively improved throughout the day, patient's speech is more clear, now oriented to place and following commands more appropriately.  Wife Jessica at bedside and updated.  Hemodynamically stable PRN Labetalol given x 2 per orders for systolic .  Will continue to monitor closely.

## 2024-08-26 NOTE — PROGRESS NOTES
Stroke Team Consult Note    The patient is a 73-year-old male with history of reported hemodialysis at home, presenting to the emergency department for concern for stroke symptoms including aphasia.  The history is provided by the patient's wife.  She reports the patient was last known normal at 10:30 PM on 8/25/2024.  When the patient presented to the [facility] emergency department, there was concern about expressive aphasia.  I saw the patient by telemedicine and noted that he was alert, followed commands, and answered questions mostly with 1 or 2 word responses.  There was no gaze deviation.  There was no drift in all 4 extremities.      I discussed with the patient's wife the potential of treating with IV TNK for aphasia.  However, there was also concern about the patient having received heparin the evening of 8/25 for dialysis at home.  Thus, an aPTT was in process.  The potential for severe intracranial hemorrhage or other severe hemorrhage secondary to IV TNK was discussed with the patient's wife.    Later in my initial telemedicine evaluation of the patient, I reassessed his speech and noted his expressive aphasia was substantially improved and he was talking with multiple word statements with no evident aphasic errors.  At that time, I recommended against IV TNK due to the symptomatic improvement.  However, subsequently, the patient's expressive aphasia significantly worsened again.  By that time, the aPTT had resulted as 30.6 and, therefore, was not a contraindication to IV TNK.    The patient was treated with IV TNK at 1:25 am, which was 2 hours 55 minutes from last known normal.  There was a delay in treatment with IV TNK due to symptomatic improvement and subsequent worsening as noted above.    The patient had no contraindications to IV thrombolysis including:   No persistent blood pressure above 185/110  No reported history of ICH at any point in the past  No sudden onset of headache or

## 2024-08-26 NOTE — PROGRESS NOTES
Physical Therapy  Facility/Department: [facility] ICU  Physical Therapy Initial Assessment / Treatment    Name: Dae Girard  : 1950  MRN: [id number]  Date of Service: 2024    Discharge Recommendations:  Subacute/Skilled Nursing Facility   PT Equipment Recommendations  Equipment Needed: No      Patient Diagnosis(es): The encounter diagnosis was Cerebrovascular accident (CVA), unspecified mechanism (HCC).  Past Medical History:  has a past medical history of Fainted, High cholesterol, Hypertension, Hypothyroid, Kidney disease, and Type II or unspecified type diabetes mellitus without mention of complication, not stated as uncontrolled.  Past Surgical History:  has a past surgical history that includes Knee arthroscopy (Left); Hand surgery (Right); Cardiac surgery; Dialysis fistula creation (Right); other surgical history; and Colonoscopy.    Assessment  Body Structures, Functions, Activity Limitations Requiring Skilled Therapeutic Intervention: Decreased functional mobility   Assessment: Pt is 73 y.o. male admit with aphasia, (+) CVA.  Pt is from home with wife and ambulates independently at baseline with occasional assist of cane vs walker.  Today, pt demos expressive and receptive aphasia and perseverates at times which limits his ability to follow conversation and simple commands.  Demos symmetrical LE strength and moderate impulsivity with mobility.  Decreased insight into deficits and decreased safety awareness.  Pt has numerous steps in his quad level home.  Rec SNF at d/c to maximize safety and independence prior to eventual return home.  Will follow.  Treatment Diagnosis: impaired gait and trasfers  Therapy Prognosis: Good  Decision Making: High Complexity  Requires PT Follow-Up: Yes    Plan  Physical Therapy Plan  General Plan: 5-7 times per week  Current Treatment Recommendations: Balance training, Functional mobility training, Gait training, Transfer training, Therapeutic activities, Home exercise

## 2024-08-26 NOTE — PROGRESS NOTES
4 Eyes Skin Assessment     NAME:  Dae Girard  YOB: 1950  MEDICAL RECORD NUMBER:  [id number]    The patient is being assessed for  Admission    I agree that at least one RN has performed a thorough Head to Toe Skin Assessment on the patient. ALL assessment sites listed below have been assessed.      Areas assessed by both nurses:    Head, Face, Ears        Does the Patient have a Wound? No noted wound(s)       Shashi Prevention initiated by RN: Yes  Wound Care Orders initiated by RN: No    Pressure Injury (Stage 3,4, Unstageable, DTI, NWPT, and Complex wounds) if present, place Wound referral order by RN under : No    New Ostomies, if present place, Ostomy referral order under : No     Nurse 1 eSignature: Electronically signed by Gurdeep Truong RN on 8/26/24 at 5:15 AM EDT    **SHARE this note so that the co-signing nurse can place an eSignature**    Nurse 2 eSignature: Electronically signed by Divya Milan RN on 8/26/24 at 7:35 AM EDT

## 2024-08-26 NOTE — PROGRESS NOTES
Occupational Therapy  Facility/Department: [facility] ICU  Occupational Therapy Initial Assessment/Treatment    Name: Dae Girard  : 1950  MRN: [id number]  Date of Service: 2024    Discharge Recommendations:  Subacute/Skilled Nursing Facility  OT Equipment Recommendations  Equipment Needed: No       Patient Diagnosis(es): The encounter diagnosis was Cerebrovascular accident (CVA), unspecified mechanism (HCC).  Past Medical History:  has a past medical history of Fainted, High cholesterol, Hypertension, Hypothyroid, Kidney disease, and Type II or unspecified type diabetes mellitus without mention of complication, not stated as uncontrolled.  Past Surgical History:  has a past surgical history that includes Knee arthroscopy (Left); Hand surgery (Right); Cardiac surgery; Dialysis fistula creation (Right); other surgical history; and Colonoscopy.    Treatment Diagnosis: Impaired ADL and functional mobility      Assessment  Performance deficits / Impairments: Decreased functional mobility ;Decreased ADL status;Decreased strength;Decreased balance;Decreased safe awareness;Decreased cognition;Decreased vision/visual deficit  Assessment: Pt is from home with wife and reportedly independent.  Pt presents with aphasia and verbal perseverations, therfore pt information is questionable.  Pt currently, req min-CG assist for functional mobility and ADL. Visual perception should be further assessed,  WIll follow for acute OT.  Treatment Diagnosis: Impaired ADL and functional mobility  Prognosis: Good  Decision Making: Medium Complexity  REQUIRES OT FOLLOW-UP: Yes  Activity Tolerance  Activity Tolerance: Patient Tolerated treatment well     Plan  Occupational Therapy Plan  Times Per Week: 5-7  Current Treatment Recommendations: Strengthening, ROM, Balance training, Functional mobility training, Endurance training, Cognitive reorientation, Self-Care / ADL, Safety education & training, Cognitive/Perceptual  for putting on and taking off regular lower body clothing?: A Little  How much help is needed for bathing (which includes washing, rinsing, drying)?: A Little  How much help is needed for toileting (which includes using toilet, bedpan, or urinal)?: A Little  How much help is needed for putting on and taking off regular upper body clothing?: A Little  How much help is needed for taking care of personal grooming?: A Little  How much help for eating meals?: None  AM-[facility] Inpatient Daily Activity Raw Score: 19  AM-[facility] Inpatient ADL T-Scale Score : 40.22  ADL Inpatient CMS 0-100% Score: 42.8  ADL Inpatient CMS G-Code Modifier : CK    Goals                          No goals met  Short Term Goals  Time Frame for Short Term Goals: Discharge  Short Term Goal 1: Transfer to/from toilet with SBA  Short Term Goal 2: Stance with SBA x5 min while engaging in ADL  Short Term Goal 3: Lower body dressing with SBA  Patient Goals   Patient goals : Not stated      Therapy Time   Individual Concurrent Group Co-treatment   Time In 1400         Time Out 1455         Minutes 55         Timed Code Treatment Minutes: 45 Minutes   Total Treatment Time:55    Tamika Vazquez OTR/L [id number]

## 2024-08-26 NOTE — H&P
ICU HISTORY AND PHYSICAL       Admit Date:  8/25/2024                            Hospital Day: 1  ICU Day: 1      CC: Difficulty speaking    History obtained from:  unobtainable from patient due to mental status. History primarily obtained from wife at bedside and EMR.    SUBJECTIVE   HPI:    Mr. Dae Girard is a 73 y.o. male with a medical hx significant for ESRD (on at-home HD 4x weekly), hypothyroidism, HTN, DM2, CAD (s/p CABG 2022, ANNIKA 2023) multiple falls who presented from home to the ED on 8/25 via EMS with difficulty speaking.  His wife Jessica was helping administer hemodialysis at home and the patient became confused, was not able to see a who he was or where he was, and did not was unable to coherently.  She states he was using the wrong words in the wrong ways like a word salad, but denies any slurring.  Onset of symptoms was around 2230 last night.  Sharon called EMS, who is very familiar with the patient having responded to multiple 911 calls to their house for multiple falls over the past year or so.  They recognized he was not normal, usually is able to state clearly what is wrong and what he needs but noted he was able to communicate.  Jessica denies any facial droop, focal weakness, or apparent vision issues.  No prior history of similar symptoms.  She denies any report of chest pain, palpitations, or lightheadedness associated with this presentation.  Patient has not complained of any pain, however the wife does note that the patient has had some abdominal discomfort and indigestion.  He is able to understand and follow commands, along with responding yes or no clearly to questions.    Etiology of the patient's recent falls seem to be multifactorial, including lightheadedness associated with low blood pressure, stumbling over his feet, and radiculopathy associated with lumbar fracture, prior femoral fracture, and disc herniations.    Notable recent medication changes include PCP stopping  1.8cm.Tricuspid annular systolic velocity:     11.2cm/s.   - Atrial septum: Agitated saline contrast study showed no     right-to-left atrial level shunt, at baseline or with provocation.   - Pulmonary arteries: Systolic pressure could not be accurately     estimated.     Impressions:  No cardiac source of emboli was indentified.       ASSESSMENT & PLAN   73 y.o. male who presented to the ED on 8/25/2024 with aphasia, last known well 2230.    #Expressive aphasia  #Presumed stroke  CTA without LVO, however due to motion fairly on diagnostic.  - Differential must include medication changes, including cessation of olanzapine recently, along with or other metabolic derangements  LKN 2230 on 8/25.  TNK administered at 0125 on 8/26 with joint decision-making with  stroke team  - Post thrombolytic ICH precautions   -SBP <180, start Cardene drip as patient is over  - q1hr neurochecks  - Neurology consulted  - MRI brain, 24-hour safety scan  - Aspirin pending safety scan  - Lipitor 80 mg  - TSH  - N.p.o. pending SLP  - PT/OT    #Hypertension  Acutely hypertensive up to the 200s, did not receive any medications in the ED.  - Hold home losartan and amlodipine  - Start Cardene drip for close pressure control post thrombolysis  - Continue to consider restarting antihypertensives after acute.      Chronic Conditions:  #ESRD  Nephrology consulted for HD    #Hallucinations  Olanzapine recently stopped, will restart.  If MRI confirms stroke, this medication cessation can be ruled out as etiology of aphasia    #DM2  Patient on Trulicity at home  Last A1c was 4.8 on 3/3/2023  Hold Trulicity, 3 times daily glucose checks  Hypoglycemia protocol  Recheck A1c    #Hypothyroidism  Continue home dose of 50 mcg Synthroid  Recheck TSH    #CAD  CABG 2022, multiple caths, most recently 2023, no longer on DAPT.  Last echo a couple days ago unremarkable  -Troponin slightly elevated in the ED, EKG nonischemic.  Troponin elevation nonsignificant

## 2024-08-27 LAB
ALBUMIN SERPL-MCNC: 3.8 G/DL (ref 3.4–5)
ANION GAP SERPL CALCULATED.3IONS-SCNC: 16 MMOL/L (ref 3–16)
BUN SERPL-MCNC: 37 MG/DL (ref 7–20)
CALCIUM SERPL-MCNC: 8.7 MG/DL (ref 8.3–10.6)
CHLORIDE SERPL-SCNC: 96 MMOL/L (ref 99–110)
CHOLEST SERPL-MCNC: 119 MG/DL (ref 0–199)
CO2 SERPL-SCNC: 27 MMOL/L (ref 21–32)
CREAT SERPL-MCNC: 4.3 MG/DL (ref 0.8–1.3)
DEPRECATED RDW RBC AUTO: 15.2 % (ref 12.4–15.4)
GFR SERPLBLD CREATININE-BSD FMLA CKD-EPI: 14 ML/MIN/{1.73_M2}
GLUCOSE BLD-MCNC: 94 MG/DL (ref 70–99)
GLUCOSE SERPL-MCNC: 84 MG/DL (ref 70–99)
HBV SURFACE AB SERPL IA-ACNC: 1000 MIU/ML
HBV SURFACE AG SERPL QL IA: NORMAL
HCT VFR BLD AUTO: 42.3 % (ref 40.5–52.5)
HDLC SERPL-MCNC: 41 MG/DL (ref 40–60)
HGB BLD-MCNC: 14.1 G/DL (ref 13.5–17.5)
LDLC SERPL CALC-MCNC: 61 MG/DL
MAGNESIUM SERPL-MCNC: 2.7 MG/DL (ref 1.8–2.4)
MCH RBC QN AUTO: 30.7 PG (ref 26–34)
MCHC RBC AUTO-ENTMCNC: 33.4 G/DL (ref 31–36)
MCV RBC AUTO: 91.8 FL (ref 80–100)
PERFORMED ON: NORMAL
PHOSPHATE SERPL-MCNC: 4.4 MG/DL (ref 2.5–4.9)
PLATELET # BLD AUTO: 204 K/UL (ref 135–450)
PMV BLD AUTO: 6.8 FL (ref 5–10.5)
POTASSIUM SERPL-SCNC: 3.7 MMOL/L (ref 3.5–5.1)
RBC # BLD AUTO: 4.6 M/UL (ref 4.2–5.9)
SODIUM SERPL-SCNC: 139 MMOL/L (ref 136–145)
TRIGL SERPL-MCNC: 87 MG/DL (ref 0–150)
VLDLC SERPL CALC-MCNC: 17 MG/DL
WBC # BLD AUTO: 7.2 K/UL (ref 4–11)

## 2024-08-27 PROCEDURE — 5A1D70Z PERFORMANCE OF URINARY FILTRATION, INTERMITTENT, LESS THAN 6 HOURS PER DAY: ICD-10-PCS | Performed by: INTERNAL MEDICINE

## 2024-08-27 PROCEDURE — 6370000000 HC RX 637 (ALT 250 FOR IP)

## 2024-08-27 PROCEDURE — 90935 HEMODIALYSIS ONE EVALUATION: CPT

## 2024-08-27 PROCEDURE — 92507 TX SP LANG VOICE COMM INDIV: CPT

## 2024-08-27 PROCEDURE — 2580000003 HC RX 258

## 2024-08-27 PROCEDURE — 83735 ASSAY OF MAGNESIUM: CPT

## 2024-08-27 PROCEDURE — 99232 SBSQ HOSP IP/OBS MODERATE 35: CPT | Performed by: INTERNAL MEDICINE

## 2024-08-27 PROCEDURE — 99233 SBSQ HOSP IP/OBS HIGH 50: CPT | Performed by: INTERNAL MEDICINE

## 2024-08-27 PROCEDURE — 6360000002 HC RX W HCPCS

## 2024-08-27 PROCEDURE — 6370000000 HC RX 637 (ALT 250 FOR IP): Performed by: INTERNAL MEDICINE

## 2024-08-27 PROCEDURE — 97535 SELF CARE MNGMENT TRAINING: CPT

## 2024-08-27 PROCEDURE — 99233 SBSQ HOSP IP/OBS HIGH 50: CPT | Performed by: NURSE PRACTITIONER

## 2024-08-27 PROCEDURE — 36415 COLL VENOUS BLD VENIPUNCTURE: CPT

## 2024-08-27 PROCEDURE — 86706 HEP B SURFACE ANTIBODY: CPT

## 2024-08-27 PROCEDURE — 2060000000 HC ICU INTERMEDIATE R&B

## 2024-08-27 PROCEDURE — 80061 LIPID PANEL: CPT

## 2024-08-27 PROCEDURE — 80069 RENAL FUNCTION PANEL: CPT

## 2024-08-27 PROCEDURE — 85027 COMPLETE CBC AUTOMATED: CPT

## 2024-08-27 PROCEDURE — 83036 HEMOGLOBIN GLYCOSYLATED A1C: CPT

## 2024-08-27 PROCEDURE — 87340 HEPATITIS B SURFACE AG IA: CPT

## 2024-08-27 RX ORDER — ASPIRIN 81 MG/1
81 TABLET, CHEWABLE ORAL DAILY
Status: DISCONTINUED | OUTPATIENT
Start: 2024-08-27 | End: 2024-08-29 | Stop reason: HOSPADM

## 2024-08-27 RX ORDER — LOSARTAN POTASSIUM 100 MG/1
100 TABLET ORAL
Status: DISCONTINUED | OUTPATIENT
Start: 2024-08-27 | End: 2024-08-29 | Stop reason: HOSPADM

## 2024-08-27 RX ORDER — ASPIRIN 300 MG/1
300 SUPPOSITORY RECTAL DAILY
Status: DISCONTINUED | OUTPATIENT
Start: 2024-08-27 | End: 2024-08-29 | Stop reason: HOSPADM

## 2024-08-27 RX ORDER — ENOXAPARIN SODIUM 100 MG/ML
30 INJECTION SUBCUTANEOUS DAILY
Status: DISCONTINUED | OUTPATIENT
Start: 2024-08-27 | End: 2024-08-27

## 2024-08-27 RX ORDER — HEPARIN SODIUM 5000 [USP'U]/ML
5000 INJECTION, SOLUTION INTRAVENOUS; SUBCUTANEOUS EVERY 8 HOURS SCHEDULED
Status: DISCONTINUED | OUTPATIENT
Start: 2024-08-28 | End: 2024-08-29 | Stop reason: HOSPADM

## 2024-08-27 RX ADMIN — ATORVASTATIN CALCIUM 80 MG: 80 TABLET, FILM COATED ORAL at 20:28

## 2024-08-27 RX ADMIN — LEVOTHYROXINE SODIUM 50 MCG: 50 TABLET ORAL at 05:59

## 2024-08-27 RX ADMIN — LOSARTAN POTASSIUM 100 MG: 100 TABLET, FILM COATED ORAL at 20:29

## 2024-08-27 RX ADMIN — SODIUM CHLORIDE, PRESERVATIVE FREE 10 ML: 5 INJECTION INTRAVENOUS at 07:55

## 2024-08-27 RX ADMIN — ASPIRIN 81 MG: 81 TABLET, CHEWABLE ORAL at 12:53

## 2024-08-27 RX ADMIN — POLYETHYLENE GLYCOL 3350 17 G: 17 POWDER, FOR SOLUTION ORAL at 16:35

## 2024-08-27 RX ADMIN — SODIUM CHLORIDE, PRESERVATIVE FREE 10 ML: 5 INJECTION INTRAVENOUS at 20:29

## 2024-08-27 RX ADMIN — RANOLAZINE 500 MG: 500 TABLET, EXTENDED RELEASE ORAL at 07:54

## 2024-08-27 RX ADMIN — ENOXAPARIN SODIUM 30 MG: 100 INJECTION SUBCUTANEOUS at 08:01

## 2024-08-27 RX ADMIN — TAMSULOSIN HYDROCHLORIDE 0.4 MG: 0.4 CAPSULE ORAL at 07:53

## 2024-08-27 RX ADMIN — RANOLAZINE 500 MG: 500 TABLET, EXTENDED RELEASE ORAL at 20:29

## 2024-08-27 NOTE — DISCHARGE INSTRUCTIONS
[facility] Stroke Program Survey  The [facility] [place] values your feedback related to your recent hospital visit and admission. We strive to improve our Neuroscience program to promote better outcomes and recoveries for all our patients.  The anonymous survey below consists of a few questions that are related to your stay and around your Stroke diagnosis, treatment, and recovery. It is anonymous and has only a few questions.  The estimated length of time needed to complete this survey is 3 minutes or less. Thank you for completing this survey!          30 DAY OUTPATIENT MONITOR      Patient instructions for CAM monitor.  You will need to wear a monitor for a total of 4 weeks. Remove & return the monitor on * .  You have a scheduled appointment at * .  They will place another monitor on you that you will need to wear for an additional 2 weeks.  Remove this monitor on * and return to office.    Return date: First monitor:    09/13/2024 0930                        Second monitor: 09/26/2024     Return to:         [address]        [address] [place] [address]       [phone]         Make sure to save the box provided as you will return your monitor in this.  After removing monitor stick it to the templete in the box and place both it and the log/diary in the box          If your monitor comes off but has been in place at least 7 days or more you can return to office.                Avoid excessive sweating to maximize wear time.         You are able to shower after 24 hours, however have the majority of water hitting your back and not directly on the monitor.  Do not submerge in bath.         Remember if experience any symptoms while wearing monitor push the button and record in booklet.

## 2024-08-27 NOTE — PROGRESS NOTES
ICU Progress Note    Admit Date: 8/25/2024  Day: 2  Vent Day: None  IV Access:Peripheral  IV Fluids:None  Vasopressors:None                Antibiotics: None  Diet: ADULT DIET; Dysphagia - Soft and Bite Sized; Kosher    CC: Speaking difficulty  MRI brain w/o did not show any evidence of hemorrhagic conversion. MRA did not show any significant stenosis. No mass effect or acute infarct noted.    Interval history: 10 mg labetalol at around 9 PM, 10 mg hydralazine at around 11 pm. BP in 110-120s overnight except 151/57 around 2 AM.     Patient seen and examined at bedside this AM with patient's wife present. Aox3. No evidence of aphasia     HD this morning with plan to take off 1 L.    HPI:   \"Mr. Dae Girard is a 73 y.o. male with a medical hx significant for ESRD (on at-home HD 4x weekly), hypothyroidism, HTN, DM2, CAD (s/p CABG 2022, ANNIKA 2023) multiple falls who presented from home to the ED on 8/25 via EMS with difficulty speaking.  His wife Jessica was helping administer hemodialysis at home and the patient became confused, was not able to see a who he was or where he was, and did not was unable to coherently.  She states he was using the wrong words in the wrong ways like a word salad, but denies any slurring.  Onset of symptoms was around 2230 last night.  Sharon called EMS, who is very familiar with the patient having responded to multiple 911 calls to their house for multiple falls over the past year or so.  They recognized he was not normal, usually is able to state clearly what is wrong and what he needs but noted he was able to communicate.  Jessica denies any facial droop, focal weakness, or apparent vision issues.  No prior history of similar symptoms.  She denies any report of chest pain, palpitations, or lightheadedness associated with this presentation.  Patient has not complained of any pain, however the wife does note that the patient has had some abdominal discomfort and indigestion.  He is able to  essentially nondiagnostic although no definite cutoff or acute large vessel occlusion can be identified.      POSTERIOR CIRCULATION: The exam is significantly degraded by motion and accentuating nondiagnostic. There is no evidence of occlusion of the basilar artery.      INTRACRANIAL VENOUS SYSTEM: No evidence for intracranial venous thrombosis.         IMPRESSION:      1. Exam is essentially nondiagnostic due to motion but no definite large vessel occlusion is identified.        Electronically signed by Caterina Sevilla MD      CT HEAD WO CONTRAST   Final Result      No acute intracranial pathology        Atrophy and ischemic leukoencephalopathy.      Findings called to ER physician on 8/26/2024 at 12      Electronically signed by Caterina Sevilla MD            Assessment/Plan:   73 y.o. male who presented to the ED on 8/25/2024 with aphasia, last known well 2230.     #Intermittent Expressive aphasia  #Presumed CVA  08/25 noted to be aphasic around 930-945 PM  S/p TNK  MRI brain w/o did not show any evidence of hemorrhagic conversion. MRA did not show any significant stenosis. No mass effect or acute infarct noted  - Post thrombolytic ICH precautions              -SBP <180, start Cardene drip as patient is over  - q4hr neurochecks  - Neurology on board, will appreciate recs  MRI brain/MRA stable  - Aspirin   - Lipitor 80 mg  - PT/OT     #Hypertension  Will tolerate high BP for the next 48 hrs  - home losartan and amlodipine held  - Start Cardene drip for close pressure control post thrombolysis  - Continue to consider restarting antihypertensives after acute.        Chronic Conditions:  #ESRD  Nephrology consulted for HD, planned for today     #Hallucinations  Olanzapine recently stopped, will discuss resumption  If MRI confirms stroke, this medication cessation can be ruled out as etiology of aphasia     #DM2  Patient on Trulicity at home  Last A1c was 4.8 on 3/3/2023  Hold Trulicity, 3 times daily glucose

## 2024-08-27 NOTE — PROGRESS NOTES
Occupational Therapy/Physical therapy  Attempt: Chart reviewed. Patient currently on dialysis. Will hold therapy session at this time and follow up later this date as patient is appropriate and schedule allows.    Debbie Cruz, OTR/L [id number]  Florence Foster, PT, DPT  [id number]

## 2024-08-27 NOTE — PROGRESS NOTES
Speech Language Pathology  Facility/Department:[facility] ICU  Speech-Language Therapy Note    Name: Dae Girard  : 1950  MRN: [id number]                                                     Patient Diagnosis(es):   Patient Active Problem List    Diagnosis Date Noted    Sepsis ([facility]) 2023    Stroke of unknown etiology ([facility]) 2024    Cerebrovascular accident (CVA) ([facility]) 2024    Stage 3b chronic kidney disease ([facility]) 2024    COVID-19 2024    Electrolyte imbalance 2023    Hypertension 2023    ESRD (end stage renal disease) ([facility]) 2023    Type II or 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, not stated as uncontrolled(250.60) 2011    Nephritis and nephropathy, not specified as acute or chronic, with other specified pathological lesion in kidney, in diseases classified elsewhere 2011    Type II or 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, uncontrolled(250.62) 2011    Diabetic polyneuropathy ([facility]) 2011    Diabetes mellitus with background retinopathy ([facility]) 2011    Other and unspecified hyperlipidemia 2011     Past Medical History:   Diagnosis Date    Fainted 2017    High cholesterol     Hypertension     Hypothyroid     Kidney disease     dialysis: Right arm fistula; states will be doing dialysis at home    Type II or unspecified type diabetes mellitus without mention of complication, not stated as uncontrolled      Past Surgical History:   Procedure Laterality Date    CARDIAC SURGERY      2022    COLONOSCOPY      last colonscopy was     DIALYSIS FISTULA CREATION Right     right arm fistula Dec. 2020    HAND SURGERY Right     carpal tunnel    KNEE ARTHROSCOPY Left     OTHER SURGICAL HISTORY      heart stents 2023     Reason for Referral:  Dae Girard was referred for a Speech Therapy evaluation to assess swallow function and/or communication.    History of Present  Signed by:  Cookie Hendrickson M.A., CCC-SLP  Speech-Language Pathologist  SP. [id number]  Pager #[phone]  This document will serve as a discharge summary if patient discharges prior to next visit.

## 2024-08-27 NOTE — PROGRESS NOTES
NEUROCRITICAL CARE PROGRESS NOTE       Patient Name: Dae Girard YOB: 1950   Sex: Male Age: 73 yrs     CC / Reason for Consult: Post TNK stroke    Changes over last 24 hours:   Preparing for dialysis at time of my visit  Aphasia much improved  PT/OT recommending SNF  BP elevated overnight up to 214/75 mmHg - was given labetalol and hydralazine    ROS: No aphasia, no focal weakness    ASSESSMENT & RECOMMENDATIONS   Assessment:  Dae Girard is a 74 y/o man HTN, DM, ESRD (on Dialysis), and HLD who presented 8/25 with aphasia, s/p TNK 01:25. Stability scan completed in the form of MRI shows no ischemia or hemorrhage. Vessel imaging unremarkable. Recent (8/21/24) TTE w/ left atrial dilation.    Plan:  - LDL and A1c in process   - ASA, statin, SQ lovenox  - Hold PO antihypertensive medications through dialysis, ok to resume thereafter.   If SBP exceeds 180 mmHg, please notify NCC at [phone]  PRN labetalol, nicardipine gtt ordered   - Aspiration precautions   - Q4H Neurologic Exams & Vitals, NIHSS per guidelines - ok to transfer to medical floor if he tolerates HD today  - Telemetry. Notify neurology of any atrial fibrillation. Will arrange long term cardiac monitor (RN navigator messaged).   If this is negative for afib, would proceed w/ ILR given left atrial dilation and no clear source of stroke  - PT/OT/SLP as needed  - Diet: Dysphagia soft and bite sized  - Stroke Education at Discharge  - Follow up w/ Neurology in 3 months     JAZMÍN Leslie - [facility]   Neurocritical Care   8/27/2024 8:51 AM  [employer]: [facility] Neurocritical Care  HISTORY   Interval History: hypertensive overnight  Stability scan shows no ICH    PMH Past Medical History:   Diagnosis Date    Fainted 06/2017    High cholesterol     Hypertension     Hypothyroid     Kidney disease     dialysis: Right arm fistula; states will be doing dialysis at home    Type II or unspecified type diabetes mellitus without mention of

## 2024-08-27 NOTE — CARE COORDINATION
Case management is following for discharge planning. The chart was reviewed. Attempted to meet with Mr. Girard at the bedside. He is alert but has expressive and receptive aphasia A call was placed to his wife, Jessica. Left a HIPAA compliant message requesting a call back.    Pt is from home with spouse. The home setting is a multi-level house. He is independent with self care and functional mobility. He utilizes a cane or rolling walker as needed. There is also a manual wheelchair available. He is active with [facility] and receives HD 4 times a week.    Therapy worked with Mr. Girard and recommended a skilled nursing facility. A list of Medicare-approved facilities for post-acute care was left at the bedside for family review. CM information provided. Will reach back out to the family as time permits.     Case Management Assessment  Initial Evaluation    Date/Time of Evaluation: 8/27/2024 12:58 PM  Assessment Completed by: Gladys Bansal RN    If patient is discharged prior to next notation, then this note serves as note for discharge by case management.    Patient Name: Dae Girard                   YOB: 1950  Diagnosis: Stroke of unknown etiology (HCC) [I63.9]  Cerebrovascular accident (CVA), unspecified mechanism (HCC) [I63.9]                   Date / Time: 8/25/2024 11:45 PM    Patient Admission Status: Inpatient   Readmission Risk (Low < 19, Mod (19-27), High > 27): Readmission Risk Score: 17.7    Current PCP: Maik Godwin, DO  PCP verified by CM? Yes    Chart Reviewed: Yes      History Provided by: Spouse, Medical Record  Patient Orientation: Unable to Assess    Patient Cognition: Other (see comment) (pt oriented to self. Expressive and receptive aphasia)    Hospitalization in the last 30 days (Readmission):  No    If yes, Readmission Assessment in CM Navigator will be completed.    Advance Directives:      Code Status: Full Code   Patient's Primary Decision Maker is:  Legal Next of Kin    Primary Decision Maker: Jessica Girard P - [phone]    Discharge Planning:    Patient lives with: Spouse/Significant Other Type of Home: House  Primary Care Giver: Other (Comment) (spouse assists with care at home as needed)  Patient Support Systems include: Spouse/Significant Other, Family Members   Current Financial resources: Medicare, Medicaid  Current community resources: Other (Comment) (Home dialysis 4x week)  Current services prior to admission: Durable Medical Equipment            Current DME: Wheelchair, Walker, Other (Comment) (home HD)            Type of Home Care services:  None    ADLS  Prior functional level: Assistance with the following:, Mobility, Other (see comment), Cooking, Housework, Shopping (cane or walker at times)  Current functional level: Assistance with the following:, Bathing, Dressing, Toileting, Cooking, Housework, Shopping, Mobility    PT AM-PAC: 17 /24  OT AM-PAC: 19 /24    Family can provide assistance at DC: Other (comment) (tbd pending extent of needs)  Would you like Case Management to discuss the discharge plan with any other family members/significant others, and if so, who? Yes (spouse)  Plans to Return to Present Housing: Unknown at present  Potential Assistance needed at discharge: Skilled Nursing Facility (THERAPY RECOMMENDED A SKILLED NURSING FACILITY)            Potential DME:  deferred  Patient expects to discharge to: Skilled nursing facility  Plan for transportation at discharge:  stretcher    Financial    Payor: [employer] MEDICARE / Plan: [employer] GOLD PLUS HMO / Product Type: *No Product type* /     Does insurance require precert for SNF: Yes    Potential assistance Purchasing Medications: No  Meds-to-Beds request:  no      [facility] [id number] - [place], [place] - [address] [phone] - F [phone]  [address] [address]  Phone: [phone] Fax: [phone]    [id number] - [place], [place]

## 2024-08-27 NOTE — PROGRESS NOTES
4 Eyes Skin Assessment     NAME:  Dae Girard  YOB: 1950  MEDICAL RECORD NUMBER:  [id number]    The patient is being assessed for  Transfer to New Unit    I agree that at least one RN has performed a thorough Head to Toe Skin Assessment on the patient. ALL assessment sites listed below have been assessed.      Areas assessed by both nurses:    Head, Face, Ears, Shoulders, Back, Chest, Arms, Elbows, Hands, Sacrum. Buttock, Coccyx, Ischium, and Legs. Feet and Heels        Does the Patient have a Wound? No noted wound(s)       Shashi Prevention initiated by RN: No  Wound Care Orders initiated by RN: No    Pressure Injury (Stage 3,4, Unstageable, DTI, NWPT, and Complex wounds) if present, place Wound referral order by RN under : No    New Ostomies, if present place, Ostomy referral order under : No     Nurse 1 eSignature: Electronically signed by Esther Abbasi RN on 8/27/24 at 6:58 PM EDT    **SHARE this note so that the co-signing nurse can place an eSignature**    Nurse 2 eSignature: Electronically signed by Veronika Lovell RN on 8/27/24 at 7:02 PM EDT

## 2024-08-27 NOTE — NURSE NAVIGATOR
Patient s/p TNK (bolus date/time: 8/26/24 at 0125 AM) 2/2 stroke-like s/s: aphasia     Verified educational Stroke booklet in room for patient and/or family to review. Patient's personal risk factors specific to stroke/TIA include: hypertension, hyperlipidemia, coronary artery disease, diabetes     Patient's chart reviewed for Stroke Core Measures and additional needs:    [x]   VTE prophylaxis - SCDs; SQ Lovenox,see eMAR    []   Antithrombotic (if applicable) - PO Aspirin ordered to start today, see new orders    [x]   Swallow screen prior to PO intake - Completed; SLP following    [x]   Lipids / A1C ordered or resulted - In process    [x]   Therapy ordered - PT AM-PAC score: 17/24; OT AM-PAC score: 19/24 - current dispo recs: SNF    [x]   Care plan and Education template    - Neurology consulted   - High intensity statin ordered/administered, see eMAR     Patient seen/discussed during NCC rounds with Dr. Carpenter and NCC NP team:   - Imaging reviewed   - MRI Brain with no evidence of an acute infarct, hemorrhage, or mass effect.   - Q4 hour neuro checks ordered   - Recent (8/21/24) TTE w/ left atrial dilation  - Long term cardiac monitor needed upon discharge, Cardiac Navigators notified     Navigator to continue to follow patient while admitted, to assist with follow up and discharge planning as needed.     Nurse eSignature: Electronically signed by Ade Anthony RN on 8/27/24 at 10:39 AM EDT - Neuroscience Navigator

## 2024-08-27 NOTE — FLOWSHEET NOTE
08/27/24 0858 08/27/24 1202   Vital Signs   BP (!) 176/68 (!) 145/77   Temp 97.5 °F (36.4 °C) 97 °F (36.1 °C)   Pulse 70 69   Respirations 11 16       Treatment time: 3 hours  Net UF: 828 ml    Pre weight: 72 kg (bed scale)  Post weight: 71.2 kg (bed scale)  EDW: 75.5 kg    Access used: KAREN AVF  Access function: No problems    Medications or blood products given: None    Regular outpatient schedule: Home HD/[facility]    Summary of response to treatment: 3 hour HD tx.  BP dropped to 101/40 with 45 minutes RTD, UFG decreased from 1 L net to minimum, 1320 ml.  Completed remainder of tx with SBP >100.  Net  ml.  No meds given.     Crit Line: H1=45.7, H2=45.6, difference of .1, no refill present.  Ending profile A.  Copy of dialysis treatment record placed in chart, to be scanned into EMR.    Report given to Sydney Mccullough RN

## 2024-08-27 NOTE — PROGRESS NOTES
Pharmacist Review and Automatic Dose Adjustment of Prophylactic Enoxaparin    The reviewing pharmacist has made an adjustment to the ordered enoxaparin dose or converted to UFH per the approved [facility] protocol and table as defined below.    Plan / Rationale: Based upon the patient's weight and renal function, the ordered dose of lovenox 30mg daily has been converted to subq heparin 5000 units TID.    Thank you,  Ralph Radhames, [facility]  8/27/2024, 9:49 AM      Dae Girard is a 73 y.o. male.     Recent Labs     08/26/24  0015 08/26/24  0523 08/27/24  0535   CREATININE 2.9* 3.2* 4.3*       Estimated Creatinine Clearance: 14 mL/min (A) (based on SCr of 4.3 mg/dL (H)). On HD.    Recent Labs     08/26/24  0523 08/27/24  0535   HGB 13.8 14.1   HCT 41.0 42.3    204     Recent Labs     08/26/24  0015   INR 1.04       Height:   Ht Readings from Last 1 Encounters:   08/25/24 1.727 m (5' 8\")     Weight:  Wt Readings from Last 1 Encounters:   08/27/24 72 kg (158 lb 11.7 oz)

## 2024-08-27 NOTE — PLAN OF CARE
Brief:  73M with PMH of ESRD on home dialysis. HTN, DM2, HLD, CABG and ANNIKA who presented with main complain of expressive aphasia while he was receiving home dialysis. CT/CTA were negative. TNK was given and not a candidate for EVT  Patient still has expressive aphasia with perseverating. His risk factors for stroke is metabolic syndrome.       No complaints at time of exam.     PHYSICAL EXAM:  Vitals:    08/26/24 1500 08/26/24 1600 08/26/24 1700 08/26/24 1800   BP: (!) 119/43 (!) 117/34 (!) 154/65 (!) 163/74   Pulse: 71 63 61 69   Resp: 16 13 13 13   Temp:  98.7 °F (37.1 °C)     TempSrc:  Oral     SpO2: 97% 96% 94% 100%   Weight:       Height:             General: Alert, no distress, well-nourished  Neurologic  Mental status:   orientation to person and place  Disoriented to time. , time, situation   Attention intact as able to attend well to the exam     Language mild expressive aphasia with some paraphasic errors. Tends to perseverate at times and can have pressured speech when frustrated.    Comprehension intact; follows simple commands    Cranial nerves:   CN2: Visual fields full w/o extinction on confrontational testing   CN 3,4,6: Pupils 3 mm > 2 mm equal and reactive to light, extraocular muscles intact  CN5: Facial sensation symmetric   CN7: Face symmetric  CN8: Hearing symmetric to spoken voice  CN9: Palate elevated symmetrically  CN11: Traps full strength on shoulder shrug  CN12: Tongue midline with protrusion    Motor Exam:  5/5 strength throughout all four limbs.     Sensory: light touch intact and symmetric in all 4 extremities.  No sensory extinction on bilateral simultaneous stimulation  Cerebellar/coordination: finger nose finger normal without ataxia  Tone: normal in all 4 extremities  Gait: Deferred for safety    OTHER SYSTEMS:  Cardiovascular: Warm, appears well perfused   Respiratory: Easy, non-labored respiratory pattern   Abdominal: Abdomen is without distention   Extremities: Upper and lower

## 2024-08-27 NOTE — FLOWSHEET NOTE
08/26/24 2145 08/26/24 2205 08/26/24 2215   Vitals   BP (!) 214/75 (!) 212/68 (!) 209/80  (see MAR)     Pts SBP remains above goal of 180. NCC team notified. PRN Hydralazine ordered and administered.

## 2024-08-27 NOTE — PLAN OF CARE
Problem: Discharge Planning  Goal: Discharge to home or other facility with appropriate resources  Outcome: Progressing  Flowsheets (Taken 8/26/2024 2000)  Discharge to home or other facility with appropriate resources: Identify barriers to discharge with patient and caregiver     Problem: Skin/Tissue Integrity  Goal: Absence of new skin breakdown  Description: 1.  Monitor for areas of redness and/or skin breakdown  2.  Assess vascular access sites hourly  3.  Every 4-6 hours minimum:  Change oxygen saturation probe site  4.  Every 4-6 hours:  If on nasal continuous positive airway pressure, respiratory therapy assess nares and determine need for appliance change or resting period.  Outcome: Progressing     Problem: Safety - Adult  Goal: Free from fall injury  Outcome: Progressing     Problem: ABCDS Injury Assessment  Goal: Absence of physical injury  Outcome: Progressing     Problem: Neurosensory - Adult  Goal: Achieves stable or improved neurological status  Outcome: Progressing  Flowsheets (Taken 8/26/2024 2000)  Achieves stable or improved neurological status: Assess for and report changes in neurological status  Goal: Absence of seizures  Outcome: Progressing  Flowsheets (Taken 8/26/2024 2000)  Absence of seizures: Monitor for seizure activity.  If seizure occurs, document type and location of movements and any associated apnea  Goal: Remains free of injury related to seizures activity  Outcome: Progressing  Flowsheets (Taken 8/26/2024 2000)  Remains free of injury related to seizure activity: Maintain airway, patient safety  and administer oxygen as ordered  Goal: Achieves maximal functionality and self care  Outcome: Progressing  Flowsheets (Taken 8/26/2024 2000)  Achieves maximal functionality and self care: Monitor swallowing and airway patency with patient fatigue and changes in neurological status

## 2024-08-27 NOTE — PROGRESS NOTES
Progress Note  Admit Date: 8/25/2024           CC: F/U for difficulty speaking.     73 y.o. male with ESRD (on at-home HD 4x weekly), hypothyroidism, HTN, DM2, CAD (s/p CABG 2022, ANNIKA 2023) multiple falls who presented from home to the ED on 8/25 via EMS with difficulty speaking.      His wife Jessica was helping administer hemodialysis at home and the patient became confused, was not able to say who he was or where he was, or speak coherently.  She states he was using the wrong words in the wrong ways like a word salad, but denies any slurring.     EMS, who is very familiar with the patient having responded to multiple 911 calls to their house for multiple falls over the past year or so, recognized he was not normal, usually is able to state clearly what is wrong and what he needs but noted he was able to communicate.      Etiology of the patient's recent falls has been felt to be multifactorial, including lightheadedness associated with low blood pressure, stumbling over his feet, and radiculopathy associated with lumbar fracture, prior femoral fracture, and disc herniations.     Notable recent medication changes include PCP stopping olanzapine 2 weeks prior to see how he did off of it.  This was initially started by the patient's endocrinologist for report of hallucinations.  Also, losartan was changed from 50 mg twice daily to 100 mg nightly, and amlodipine, which was 5 mg twice daily was changed to 5 mg nightly.     Wife noted that the patient's blood pressure was elevated , typically in the 170s, and as high as 200 systolic.     ED Course:  On arrival to the ED, he was found to have expressive aphasia, which resolved by the time the  stroke team evaluated him via telehealth.  Imaging was negative.  The patient later redeveloped the aphasia, and given the patient was in the window and his symptoms, they opted to administer TNK, which was given at 1:25 AM.  Labs were not very remarkable  Imaging was grossly

## 2024-08-27 NOTE — PROGRESS NOTES
Occupational Therapy  Facility/Department: [facility] ICU  Daily Treatment Note  NAME: Dae Girard  : 1950  MRN: [id number]    Date of Service: 2024    Discharge Recommendations:  Subacute/Skilled Nursing Facility         Patient Diagnosis(es): The encounter diagnosis was Cerebrovascular accident (CVA), unspecified mechanism (HCC).     Assessment   Assessment: Patient tolerated OT session well. Patient tremulous at times and requiring moderate cues for safety, problem solving, attention. Patient currently requiring CGA-Mod A with simple transfers and functional mobility using FWW. Patient currently requiring up to Min A with simple ADLs. Patient with decreased safety awareness. Patient continues to demonstrate performance component deficits in balance, activity tolerance, strength, coordination, safety, cognition. At baseline patient is independent with ADLs and ambulates with use of walker or cane as needed. Patient remains below baseline level of occupational performance and poses a fall risk with recent history of recurrent falls. Recommend continued OT services to increase safety and independence with ADLs and functional transfers. Discharge recommendations remain SNF for continued therapy services.  Activity Tolerance: Patient tolerated treatment well  Discharge Recommendations: Subacute/Skilled Nursing Facility     Plan  Occupational Therapy Plan  Times Per Week: 5-7  Current Treatment Recommendations: Strengthening;ROM;Balance training;Functional mobility training;Endurance training;Cognitive reorientation;Self-Care / ADL;Safety education & training;Cognitive/Perceptual training;Gait training;Patient/Caregiver education & training;Equipment evaluation, education, & procurement;Positioning;Coordination training    Restrictions       Subjective  Subjective  Subjective: Patient seated in chair upon OT arrival. Patient reports concerns with intermittent LUE tremors. Patient observed with occassional

## 2024-08-27 NOTE — PLAN OF CARE
Problem: Discharge Planning  Goal: Discharge to home or other facility with appropriate resources  8/27/2024 1349 by Sydney Mccullough RN  Outcome: Progressing  Discharge to home or other facility with appropriate resources: Identify barriers to discharge with patient and caregiver     Problem: Skin/Tissue Integrity  Goal: Absence of new skin breakdown  Description: 1.  Monitor for areas of redness and/or skin breakdown  2.  Assess vascular access sites hourly  3.  Every 4-6 hours minimum:  Change oxygen saturation probe site  4.  Every 4-6 hours:  If on nasal continuous positive airway pressure, respiratory therapy assess nares and determine need for appliance change or resting period.  8/27/2024 1349 by Sydney Mccullough RN  Outcome: Progressing     Problem: Safety - Adult  Goal: Free from fall injury  8/27/2024 1349 by Sydney Mccullough RN  Outcome: Progressing  Flowsheets (Taken 8/27/2024 0916)  Free From Fall Injury: Instruct family/caregiver on patient safety     Problem: ABCDS Injury Assessment  Goal: Absence of physical injury  8/27/2024 1349 by Sydney Mccullough RN  Outcome: Progressing  Flowsheets (Taken 8/27/2024 0916)  Absence of Physical Injury: Implement safety measures based on patient assessment     Problem: Neurosensory - Adult  Goal: Achieves stable or improved neurological status  8/27/2024 1349 by Sydney Mccullough RN  Outcome: Progressing  Achieves stable or improved neurological status: Assess for and report changes in neurological status    Problem: Respiratory - Adult  Goal: Achieves optimal ventilation and oxygenation  Outcome: Progressing     Problem: Cardiovascular - Adult  Goal: Maintains optimal cardiac output and hemodynamic stability  Outcome: Progressing     Problem: Skin/Tissue Integrity - Adult  Goal: Skin integrity remains intact  Outcome: Progressing  Flowsheets  Taken 8/27/2024 0916 by Sydney Mccullough RN  Skin Integrity Remains Intact:   Monitor for areas of redness

## 2024-08-27 NOTE — PROGRESS NOTES
Nephrology Progress Note                                                                                                                                                                                                                                                                                                                                                               Office : [phone]     Fax :[phone]    Patient's Name: Dae Girard    8/27/2024    Reason for Consult:  ESRD   Requesting Physician:  Maik Godwin DO  Chief Complaint:    Chief Complaint   Patient presents with    Altered Mental Status     Patient to the ER with increased altered mental status following home dialysis. LKN 9pm       Assessment/Plan     #1 ESRD  #2 Hypertension uncontrolled  #3 Anemia of chronic disease  #4 CVA  #5 Electrolyte imbalance  #6 CAD   #7 DM2     Plan:  - HD today   - Renally dose meds for ESRD  - Keep SBP < 180 per neuro. Can start resuming PO meds after HD today   - Holding EPO  - MRI without evidence of ICH      History of Present Ilness:    73 y.o. y/o male with history significant for ESRD (on at home HD 4x weekly), HTN, HLD, T2DM, hypothyroidism, CAD s/p CABG x 3 (2022) and ANNIKA (2023), CHF, and hallucinations presented to [facility] with chief complaint of aphasia that started while he was receiving home dialysis. NIH 3 on arrival to the ED. CT head unremarkable. CTA non-diagnostic due to motion artifact but per  stroke team note, no LVO. Patient's aphasia fluctuated int he ED, and had initially improved substantially, to where TNK was not advised. However, on repeat examination, his aphasia had acutely worsened, therefore he received TNK at 1:25 AM on 8/26/24. Not a candidate for EVT due to no LVO and low NIHSS. Patient has multiple risk vascular risk factors for stroke.     Interval hx:    Seen on HD  Feeling well at this time  No SOB  BP's acceptable  Discussed with wife at bedside       Past Medical

## 2024-08-28 LAB
ALBUMIN SERPL-MCNC: 4 G/DL (ref 3.4–5)
ANION GAP SERPL CALCULATED.3IONS-SCNC: 12 MMOL/L (ref 3–16)
BUN SERPL-MCNC: 28 MG/DL (ref 7–20)
CALCIUM SERPL-MCNC: 8.8 MG/DL (ref 8.3–10.6)
CHLORIDE SERPL-SCNC: 97 MMOL/L (ref 99–110)
CO2 SERPL-SCNC: 27 MMOL/L (ref 21–32)
CREAT SERPL-MCNC: 4 MG/DL (ref 0.8–1.3)
DEPRECATED RDW RBC AUTO: 15.4 % (ref 12.4–15.4)
EST. AVERAGE GLUCOSE BLD GHB EST-MCNC: 68.1 MG/DL
GFR SERPLBLD CREATININE-BSD FMLA CKD-EPI: 15 ML/MIN/{1.73_M2}
GLUCOSE BLD-MCNC: 152 MG/DL (ref 70–99)
GLUCOSE BLD-MCNC: 79 MG/DL (ref 70–99)
GLUCOSE SERPL-MCNC: 88 MG/DL (ref 70–99)
HBA1C MFR BLD: 4 %
HCT VFR BLD AUTO: 42.7 % (ref 40.5–52.5)
HGB BLD-MCNC: 14 G/DL (ref 13.5–17.5)
MAGNESIUM SERPL-MCNC: 2.5 MG/DL (ref 1.8–2.4)
MCH RBC QN AUTO: 30.1 PG (ref 26–34)
MCHC RBC AUTO-ENTMCNC: 32.7 G/DL (ref 31–36)
MCV RBC AUTO: 92 FL (ref 80–100)
PERFORMED ON: ABNORMAL
PERFORMED ON: NORMAL
PHOSPHATE SERPL-MCNC: 3 MG/DL (ref 2.5–4.9)
PLATELET # BLD AUTO: 218 K/UL (ref 135–450)
PMV BLD AUTO: 6.7 FL (ref 5–10.5)
POTASSIUM SERPL-SCNC: 4.2 MMOL/L (ref 3.5–5.1)
RBC # BLD AUTO: 4.65 M/UL (ref 4.2–5.9)
SODIUM SERPL-SCNC: 136 MMOL/L (ref 136–145)
WBC # BLD AUTO: 7.7 K/UL (ref 4–11)

## 2024-08-28 PROCEDURE — 92507 TX SP LANG VOICE COMM INDIV: CPT

## 2024-08-28 PROCEDURE — 36415 COLL VENOUS BLD VENIPUNCTURE: CPT

## 2024-08-28 PROCEDURE — 97535 SELF CARE MNGMENT TRAINING: CPT

## 2024-08-28 PROCEDURE — 6370000000 HC RX 637 (ALT 250 FOR IP): Performed by: INTERNAL MEDICINE

## 2024-08-28 PROCEDURE — 51798 US URINE CAPACITY MEASURE: CPT

## 2024-08-28 PROCEDURE — 6370000000 HC RX 637 (ALT 250 FOR IP)

## 2024-08-28 PROCEDURE — 6360000002 HC RX W HCPCS

## 2024-08-28 PROCEDURE — 83735 ASSAY OF MAGNESIUM: CPT

## 2024-08-28 PROCEDURE — 85027 COMPLETE CBC AUTOMATED: CPT

## 2024-08-28 PROCEDURE — 2060000000 HC ICU INTERMEDIATE R&B

## 2024-08-28 PROCEDURE — 2580000003 HC RX 258

## 2024-08-28 PROCEDURE — 80069 RENAL FUNCTION PANEL: CPT

## 2024-08-28 PROCEDURE — 92526 ORAL FUNCTION THERAPY: CPT

## 2024-08-28 PROCEDURE — 97116 GAIT TRAINING THERAPY: CPT

## 2024-08-28 PROCEDURE — 99233 SBSQ HOSP IP/OBS HIGH 50: CPT | Performed by: INTERNAL MEDICINE

## 2024-08-28 PROCEDURE — 97530 THERAPEUTIC ACTIVITIES: CPT

## 2024-08-28 RX ORDER — AMLODIPINE BESYLATE 5 MG/1
5 TABLET ORAL DAILY
DISCHARGE
Start: 2024-08-28 | End: 2024-08-29

## 2024-08-28 RX ORDER — AMLODIPINE BESYLATE 5 MG/1
5 TABLET ORAL 2 TIMES DAILY
Qty: 30 TABLET | Refills: 3 | Status: SHIPPED | OUTPATIENT
Start: 2024-08-28 | End: 2024-08-28

## 2024-08-28 RX ORDER — ATORVASTATIN CALCIUM 80 MG/1
80 TABLET, FILM COATED ORAL NIGHTLY
DISCHARGE
Start: 2024-08-28 | End: 2024-08-29

## 2024-08-28 RX ADMIN — ATORVASTATIN CALCIUM 80 MG: 80 TABLET, FILM COATED ORAL at 20:04

## 2024-08-28 RX ADMIN — RANOLAZINE 500 MG: 500 TABLET, EXTENDED RELEASE ORAL at 20:04

## 2024-08-28 RX ADMIN — LEVOTHYROXINE SODIUM 50 MCG: 50 TABLET ORAL at 06:12

## 2024-08-28 RX ADMIN — HEPARIN SODIUM 5000 UNITS: 5000 INJECTION INTRAVENOUS; SUBCUTANEOUS at 14:58

## 2024-08-28 RX ADMIN — HEPARIN SODIUM 5000 UNITS: 5000 INJECTION INTRAVENOUS; SUBCUTANEOUS at 22:21

## 2024-08-28 RX ADMIN — SODIUM CHLORIDE, PRESERVATIVE FREE 10 ML: 5 INJECTION INTRAVENOUS at 08:24

## 2024-08-28 RX ADMIN — LOSARTAN POTASSIUM 100 MG: 100 TABLET, FILM COATED ORAL at 20:04

## 2024-08-28 RX ADMIN — AMLODIPINE BESYLATE 5 MG: 5 TABLET ORAL at 20:04

## 2024-08-28 RX ADMIN — ASPIRIN 81 MG: 81 TABLET, CHEWABLE ORAL at 08:22

## 2024-08-28 RX ADMIN — HYDRALAZINE HYDROCHLORIDE 10 MG: 20 INJECTION INTRAMUSCULAR; INTRAVENOUS at 00:16

## 2024-08-28 RX ADMIN — AMLODIPINE BESYLATE 5 MG: 5 TABLET ORAL at 08:22

## 2024-08-28 RX ADMIN — HEPARIN SODIUM 5000 UNITS: 5000 INJECTION INTRAVENOUS; SUBCUTANEOUS at 06:12

## 2024-08-28 RX ADMIN — RANOLAZINE 500 MG: 500 TABLET, EXTENDED RELEASE ORAL at 08:22

## 2024-08-28 RX ADMIN — TAMSULOSIN HYDROCHLORIDE 0.4 MG: 0.4 CAPSULE ORAL at 08:22

## 2024-08-28 NOTE — DISCHARGE INSTR - COC
Continuity of Care Form    Patient Name: Dae Girard   :  1950  MRN:  [id number]    Admit date:  2024  Discharge date:  ***    Code Status Order: Full Code   Advance Directives:   Advance Care Flowsheet Documentation             Admitting Physician:  Shira Becerril MD  PCP: Maik Godwin DO    Discharging Nurse: ***  Discharging Hospital Unit/Room#: 3505/3505-01  Discharging Unit Phone Number: ***    Emergency Contact:   Extended Emergency Contact Information  Primary Emergency Contact: Jessica Girard  Address: [address] [place] [address] [facility]  Home Phone: [phone]  Mobile Phone: [phone]  Relation: None    Past Surgical History:  Past Surgical History:   Procedure Laterality Date    CARDIAC SURGERY      2022    COLONOSCOPY      last colonscopy was     DIALYSIS FISTULA CREATION Right     right arm fistula Dec. 2020    HAND SURGERY Right     carpal tunnel    KNEE ARTHROSCOPY Left     OTHER SURGICAL HISTORY      heart stents 2023       Immunization History:   Immunization History   Administered Date(s) Administered    COVID-19, MODERNA BLUE border, Primary or Immunocompromised, (age 12y+), IM, 100 mcg/0.5mL 2021, 2021, 2021       Active Problems:  Patient Active Problem List   Diagnosis Code    Type II or 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, uncontrolled(250.62) E11.49    Diabetic polyneuropathy ([facility]) E11.42    Diabetes mellitus with background retinopathy ([facility]) E11.3299    Other and unspecified hyperlipidemia E78.5    Type II or 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, not stated as uncontrolled(250.60) E11.49    Nephritis and nephropathy, not specified as acute or chronic, with other specified pathological lesion in kidney, in diseases classified elsewhere N05.8    Sepsis ([facility]) A41.9    ESRD (end stage renal disease) ([facility]) N18.6    Electrolyte  imbalance E87.8    Hypertension I10    COVID-19 U07.1    Stroke of unknown etiology (HCC) I63.9    Cerebrovascular accident (CVA) (HCC) I63.9    Stage 3b chronic kidney disease (HCC) N18.32       Isolation/Infection:   Isolation            No Isolation          Patient Infection Status       Infection Onset Added Last Indicated Last Indicated By Review Planned Expiration Resolved Resolved By    None active    Resolved    COVID-19 24 COVID-19 & Influenza Combo   24 Infection                        Nurse Assessment:  Last Vital Signs: BP (!) 133/47   Pulse 77   Temp 98.1 °F (36.7 °C) (Oral)   Resp 17   Ht 1.727 m (5' 8\")   Wt 73.1 kg (161 lb 3.2 oz)   SpO2 94%   BMI 24.51 kg/m²     Last documented pain score (0-10 scale):    Last Weight:   Wt Readings from Last 1 Encounters:   24 73.1 kg (161 lb 3.2 oz)     Mental Status:  {IP PT MENTAL STATUS:}    IV Access:  { JENNIE IV ACCESS:165478081}    Nursing Mobility/ADLs:  Walking   {CHP DME ADLs:748803935}  Transfer  {CHP DME ADLs:660968632}  Bathing  {CHP DME ADLs:130853051}  Dressing  {CHP DME ADLs:882942110}  Toileting  {CHP DME ADLs:236353291}  Feeding  {P DME ADLs:963098644}  Med Admin  {P DME ADLs:947890692}  Med Delivery   {[facility] MED Delivery:438781322}    Wound Care Documentation and Therapy:        Elimination:  Continence:   Bowel: {YES / NO:}  Bladder: {YES / NO:}  Urinary Catheter: {Urinary Catheter:693136254}   Colostomy/Ileostomy/Ileal Conduit: {YES / NO:}       Date of Last BM: ***    Intake/Output Summary (Last 24 hours) at 2024 1458  Last data filed at 2024 0230  Gross per 24 hour   Intake 100 ml   Output 50 ml   Net 50 ml     I/O last 3 completed shifts:  In: 580 [P.O.:570; I.V.:10]  Out: 200 [Urine:200]    Safety Concerns:     { JENNIE Safety Concerns:688114821}    Impairments/Disabilities:      { JENNIE Impairments/Disabilities:467945635}    Nutrition Therapy:  Current

## 2024-08-28 NOTE — NURSE NAVIGATOR
Patient's chart reviewed for Stroke Core Measures and additional needs:    [x]   Antithrombotic (if applicable) - PO Aspirin, see eMAR    [x]   Lipids / A1C ordered or resulted - See results below    [x]   Therapy ordered - Precert for ARU initiated; CM following to assist      Latest Reference Range & Units Most Recent   LDL Cholesterol <100 mg/dL 61  8/27/24 05:35      Latest Reference Range & Units Most Recent   Hemoglobin A1C See comment % 4.0  8/27/24 05:35     Personalized stroke education     Discussed personal risk factors for Stroke/TIA with patient/family, and ways to reduce the risk for a recurrent stroke. Patient's personal risk factors which were identified are: hypertension, hyperlipidemia, coronary artery disease, diabetes     Advised patient that you can reduce your risk for stroke/TIA by modifying/controlling the risk factors that you have. Patient advised to take the medications as prescribed, which will be detailed in the discharge instructions, and to not stop taking them without consulting their physician. In addition, patient advised to maintain a healthy diet, exercise regularly and to not smoke.     [facility]'s Stroke treatment and prevention, Managing your recovery notebook provided and/or reviewed with patient/family. The notebook includes, but not limited to, sections addressing warning signs & symptoms of a stroke, which are: sudden numbness or weakness especially on one side of the body, sudden confusion, difficulty speaking or understanding, sudden changes in vision, sudden dizziness or loss of balance/ coordination, sudden severe headache, syncope and seizure. The need to call EMS (911) immediately if signs & symptoms occur is emphasized. The notebook also provides education on Stroke community resources and stroke advocacy.    Stroke Education booklet given to patient/family (or verified, if given already), which reviews above information. yes     Patient and/or family member:

## 2024-08-28 NOTE — PROGRESS NOTES
Progress Note  Admit Date: 8/25/2024           CC: F/U for difficulty speaking.     73 y.o. male with ESRD (on at-home HD 4x weekly), hypothyroidism, HTN, DM2, CAD (s/p CABG 2022, ANNIKA 2023) multiple falls who presented from home to the ED on 8/25 via EMS with difficulty speaking.      His wife Jessica was helping administer hemodialysis at home and the patient became confused, was not able to say who he was or where he was, or speak coherently.  She states he was using the wrong words in the wrong ways like a word salad, but denies any slurring.     EMS, who is very familiar with the patient having responded to multiple 911 calls to their house for multiple falls over the past year or so, recognized he was not normal, usually is able to state clearly what is wrong and what he needs but noted he was able to communicate.      Etiology of the patient's recent falls has been felt to be multifactorial, including lightheadedness associated with low blood pressure, stumbling over his feet, and radiculopathy associated with lumbar fracture, prior femoral fracture, and disc herniations.     Notable recent medication changes include PCP stopping olanzapine 2 weeks prior to see how he did off of it.  This was initially started by the patient's endocrinologist for report of hallucinations.  Also, losartan was changed from 50 mg twice daily to 100 mg nightly, and amlodipine, which was 5 mg twice daily was changed to 5 mg nightly.     Wife noted that the patient's blood pressure was elevated , typically in the 170s, and as high as 200 systolic.     ED Course:  On arrival to the ED, he was found to have expressive aphasia, which resolved by the time the  stroke team evaluated him via telehealth.  Imaging was negative.  The patient later redeveloped the aphasia, and given the patient was in the window and his symptoms, they opted to administer TNK, which was given at 1:25 AM.  Labs were not very remarkable  Imaging was grossly  negative for acute findings, however motion artifact did interfere with diagnostic accuracy of the CTA  While in the ED, he received TNK    Interval History: Was admitted post TNK to the ICU  No new complaint    Review of Systems - Negative except as in HPI.     Scheduled Medications:    aspirin  81 mg Oral Daily    Or    aspirin  300 mg Rectal Daily    heparin (porcine)  5,000 Units SubCUTAneous 3 times per day    losartan  100 mg Oral QHS    labetalol  10 mg IntraVENous Once    sodium chloride flush  5-40 mL IntraVENous 2 times per day    sodium chloride flush  5-40 mL IntraVENous 2 times per day    atorvastatin  80 mg Oral Nightly    levothyroxine  50 mcg Oral Daily    amLODIPine  5 mg Oral BID    [Held by provider] OLANZapine  2.5 mg Oral Nightly    ranolazine  500 mg Oral BID    tamsulosin  0.4 mg Oral Daily      PRN Medications: perflutren lipid microspheres, sodium chloride flush, sodium chloride, dextrose bolus, sodium chloride flush, sodium chloride, ondansetron **OR** ondansetron, polyethylene glycol, glucose, dextrose bolus **OR** dextrose bolus, glucagon (rDNA), dextrose, hydrALAZINE  Diet: ADULT DIET; Regular    Continuous Infusions:   sodium chloride      sodium chloride      dextrose         PHYSICAL EXAM:  /63   Pulse 85   Temp 98.1 °F (36.7 °C) (Oral)   Resp 14   Ht 1.727 m (5' 8\")   Wt 73.1 kg (161 lb 3.2 oz)   SpO2 98%   BMI 24.51 kg/m²   Recent Labs     08/26/24  0801 08/26/24  1151 08/27/24 2027 08/28/24  0226 08/28/24  1637   POCGLU 91 100* 94 79 152*       Intake/Output Summary (Last 24 hours) at 8/28/2024 1800  Last data filed at 8/28/2024 0230  Gross per 24 hour   Intake 100 ml   Output 50 ml   Net 50 ml     Physical Exam  Vitals reviewed.   Constitutional:       Appearance: He is normal weight. He is not ill-appearing or toxic-appearing.   HENT:      Head: Normocephalic and atraumatic.   Cardiovascular:      Rate and Rhythm: Normal rate.   Pulmonary:      Effort: Pulmonary

## 2024-08-28 NOTE — PROGRESS NOTES
Physical Therapy  Facility/Department: [address] CANCER Welcome  Daily Treatment Note  NAME: Dae Girard  : 1950  MRN: [id number]    Date of Service: 2024    Discharge Recommendations:  24 hour supervision or assist, Outpatient PT   PT Equipment Recommendations  Equipment Needed: No    Patient Diagnosis(es): The encounter diagnosis was Cerebrovascular accident (CVA), unspecified mechanism (HCC).    Assessment  Assessment: Pt has made good progress & is now functioning close to baseline. If pt goes home, recommend 24-hr assistance & oupt PT(per pt & wife preference). Will follow per plan of care to maximize independence.  Activity Tolerance: Patient tolerated treatment well  Equipment Needed: No    Plan  Physical Therapy Plan  General Plan: 5-7 times per week  Current Treatment Recommendations: Balance training;Functional mobility training;Gait training;Transfer training;Therapeutic activities;Home exercise program;Safety education & training;Patient/Caregiver education & training    Restrictions  Position Activity Restriction  Other position/activity restrictions: Up with assist, Bedrest for 8 hours from initiation of thrombolytic (0245 on ).  Followed by up with assistance with PT/OT     Subjective   Subjective  Subjective: Pt seated in chair & agreeable to PT.  Pain: pt denies pain  Objective    Bed Mobility Training  Bed Mobility Training: No  Balance  Sitting: Intact  Standing - Static:  (SUPV)  Standing - Dynamic:  (SBA for ambulation)  Transfer Training  Transfer Training: Yes  Interventions: Safety awareness training;Verbal cues  Sit to Stand: Stand-by assistance (from various chairs)  Stand to Sit: Stand-by assistance  Gait  Gait Training: Yes  Overall Level of Assistance: Stand-by assistance  Distance (ft):  (200 ft x 2 with RW, 15-25 ft x 4 without device)  Assistive Device: Walker, rolling;None  Speed/[place]: Slow  Step Length: Left shortened;Right shortened  Gait Abnormalities:

## 2024-08-28 NOTE — PROGRESS NOTES
Speech Language Pathology  Facility/Department:[facility] ICU  Speech-Language Treatment  Dysphagia Treatment/DC    Name: Dae Girard  : 1950  MRN: [id number]                                                     Patient Diagnosis(es):   Patient Active Problem List    Diagnosis Date Noted    Sepsis ([facility]) 2023    Stroke of unknown etiology ([facility]) 2024    Cerebrovascular accident (CVA) ([facility]) 2024    Stage 3b chronic kidney disease ([facility]) 2024    COVID-19 2024    Electrolyte imbalance 2023    Hypertension 2023    ESRD (end stage renal disease) ([facility]) 2023    Type II or 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, not stated as uncontrolled(250.60) 2011    Nephritis and nephropathy, not specified as acute or chronic, with other specified pathological lesion in kidney, in diseases classified elsewhere 2011    Type II or 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, uncontrolled(250.62) 2011    Diabetic polyneuropathy ([facility]) 2011    Diabetes mellitus with background retinopathy ([facility]) 2011    Other and unspecified hyperlipidemia 2011     Past Medical History:   Diagnosis Date    Fainted 2017    High cholesterol     Hypertension     Hypothyroid     Kidney disease     dialysis: Right arm fistula; states will be doing dialysis at home    Type II or unspecified type diabetes mellitus without mention of complication, not stated as uncontrolled      Past Surgical History:   Procedure Laterality Date    CARDIAC SURGERY      2022    COLONOSCOPY      last colonscopy was     DIALYSIS FISTULA CREATION Right     right arm fistula Dec. 2020    HAND SURGERY Right     carpal tunnel    KNEE ARTHROSCOPY Left     OTHER SURGICAL HISTORY      heart stents 2023     Reason for Referral:  Dae Girard was referred for a Speech Therapy evaluation to assess swallow function and/or  communication.    History of Present Illness  Per MD notes:  \" Mr. Dae Girard is a 73 y.o. male with a medical hx significant for ESRD (on at-home HD 4x weekly), hypothyroidism, HTN, DM2, CAD (s/p CABG 2022, ANNIKA 2023) multiple falls who presented from home to the ED on 8/25 via EMS with difficulty speaking.  His wife Jessica was helping administer hemodialysis at home and the patient became confused, was not able to see a who he was or where he was, and did not was unable to coherently.  She states he was using the wrong words in the wrong ways like a word salad, but denies any slurring.  Onset of symptoms was around 2230 last night.  Sharon called EMS, who is very familiar with the patient having responded to multiple 911 calls to their house for multiple falls over the past year or so. They recognized he was not normal, usually is able to state clearly what is wrong and what he needs but noted he was able to communicate. Jessica denies any facial droop, focal weakness, or apparent vision issues.  No prior history of similar symptoms.  She denies any report of chest pain, palpitations, or lightheadedness associated with this presentation.  Patient has not complained of any pain, however the wife does note that the patient has had some abdominal discomfort and indigestion.  He is able to understand and follow commands, along with responding yes or no clearly to questions.  Etiology of the patient's recent falls seem to be multifactorial, including lightheadedness associated with low blood pressure, stumbling over his feet, and radiculopathy associated with lumbar fracture, prior femoral fracture, and disc herniations.  Notable recent medication changes include PCP stopping olanzapine 2 weeks ago to see how he did off of it. This was initially started by the patient's endocrinologist for report of hallucinations. Also, losartan was changed from 50 mg twice daily to 100 mg nightly, and amlodipine, which was 5 mg

## 2024-08-28 NOTE — CARE COORDINATION
3:30pm: Discharge order noted. Spoke with pt's wife Jessica at bedside as pt was working with PT. Jessica aware of the discharge order and insurance authorization is pending for [facility] as pt was agreeable to this. Jessica confirmed that they want [facility] if possible and if not, will discuss SNF's with HD. Offered ARU list and she declined. Explained that recommendations may change to home as he was working with PT and she states they would be open to this but wants the HD changed to outpatient at the Clinic vs home due to his blood pressure issues. She states he was doing outpatient PT at [facility] and would need ST if recommended. SW took Jessica to the family room where he was working with PT. They both are aware of the discharge and want to await to see the determination for the ARU but they want HD at the office. They are open to returning home if denied ARU with resumption of outpatient PT but wants outpatient HD. Explained will look into HD at the clinic vs home.     Perfect serve sent to Megan ROSS inquiring about changing to outpatient HD vs home HD at this time.     Mira ANDRADE, EDWAR   for [facility] ([facility])  [employer] Mobile: [phone]

## 2024-08-28 NOTE — PROGRESS NOTES
Update 8/29/2024: precert denied- patient recommended to go home with [facility].     [facility] - Acute Rehab Unit   After review, this patient is felt to be:       [x]  Dr. Coley states this patient is appropriate for rehab.     []  Not appropriate for Acute Inpatient Rehab    []  Referral received and ARU reviewing patient      Precert initiated 8/28/2024 for ARU admission. Pt in agreement per  and CL's conversation with pt this AM. Ref#: [id number] .    Will notify CM/SW with further updates. Thank you for the referral.    Linnette RAMIREZ OTR/L  Clinical Liaison- [facility]   (P): [phone]  (F): [phone]

## 2024-08-28 NOTE — PROGRESS NOTES
reorientation;Self-Care / ADL;Safety education & training;Cognitive/Perceptual training;Gait training;Patient/Caregiver education & training;Equipment evaluation, education, & procurement;Positioning;Coordination training    Restrictions   Position Activity Restriction  Other position/activity restrictions: Up with christopher    Subjective  Subjective  Subjective: Pt presents supine in bed upon arrival. Pt is highly motivated and agreeable this session. Pt has no complaints of pain.  Orientation  Overall Orientation Status: Within Functional Limits  Orientation Level: Oriented to place;Oriented to person;Oriented to situation (Not formally assessed)  Pain: pt denies pain  Cognition  Overall Cognitive Status: WFL  Arousal/Alertness: Appears intact  Following Commands: Follows multistep commands with repitition  Attention Span: Attends with cues to redirect  Safety Judgement: Decreased awareness of need for assistance;Decreased awareness of need for safety  Problem Solving: Assistance required to identify errors made  Insights: Decreased awareness of deficits  Initiation: Requires cues for some  Sequencing: Requires cues for some  Cognition Comment: Tangential and verbose this session requiring frequent re-direction. Pt does not demonstrate impulsivity this date. Min cue for hand placement with 2WW during STS.       Objective  Bed Mobility Training  Bed Mobility Training: Yes  Overall Level of Assistance: Stand-by assistance;Additional time (HOB elevated (bed raises at home) + bed rail)  Interventions: Safety awareness training;Verbal cues  Supine to Sit: Stand-by assistance;Additional time  Balance  Sitting: Intact (SPV EOB and on couch ~ 10-12 minutes)  Standing: High guard (SBA with intermittent CGA and use of with 2WW/UE support at sinkside/toilet/edge of bed, chair and couch ~ 15-18 minutes total)  Transfer Training  Transfer Training: Yes  Overall Level of Assistance: Assist X1;Additional time;Stand-by  assistance;Contact-guard assistance  Interventions: Safety awareness training;Verbal cues  Sit to Stand: Contact-guard assistance;Assist X1;Additional time;Stand-by assistance;Adaptive equipment (CGA progressing to SBA)  Stand to Sit: Contact-guard assistance;Stand-by assistance;Assist X1;Additional time;Adaptive equipment (CGA progressing to SBA)  Bed to Chair: Stand-by assistance;Assist X1;Additional time;Adaptive equipment  Toilet Transfer: Stand-by assistance;Additional time;Adaptive equipment;Assist X1 (x 3 consecutive trials to promote functional strength for ADLs)  Gait  Gait Training: Yes  Overall Level of Assistance: Contact-guard assistance;Assist X1;Additional time  Distance (ft):  (Household distances (Bed to sink, sink to toilet, toilet to couch, couch to chair))  Assistive Device: Walker, rolling;Gait belt  Interventions: Safety awareness training;Verbal cues     ADL  Grooming: Stand by assistance;Increased time to complete;Verbal cueing  Grooming Skilled Clinical Factors: oral hygiene + brushing hair + washing face + hand hygiene in stance at sink for ~ 12 minutes with zero rest breaks and overall SBA at sinkside. 1-2 cues for problem solving this date (container management, etc)  LE Dressing: Stand by assistance;Increased time to complete  LE Dressing Skilled Clinical Factors: doffing/donning B socks seated edge of couch. Pt states he owns a reacher. Educated on safety benefits of using reacher vs significant trunk flexion noted this date for extended time to complete doffing/donning L sock. Pt verbalizes understanding.  Skin Care: Soap and water        Safety Devices  Type of Devices: Nurse notified;Gait belt;Call light within reach;Patient at risk for falls;Left in chair;Chair alarm in place;All fall risk precautions in place    Patient Education  Education Given To: Patient  Education Provided: Role of Therapy;Plan of Care;ADL Adaptive Strategies;Transfer Training;Orientation;Equipment;Mobility

## 2024-08-28 NOTE — PLAN OF CARE
Problem: Safety - Adult  Goal: Free from fall injury  Outcome: Progressing     Problem: ABCDS Injury Assessment  Goal: Absence of physical injury  Outcome: Progressing  Flowsheets (Taken 8/28/2024 0654)  Absence of Physical Injury: Implement safety measures based on patient assessment     Problem: Neurosensory - Adult  Goal: Achieves stable or improved neurological status  Outcome: Progressing  Flowsheets (Taken 8/28/2024 0654)  Achieves stable or improved neurological status: Assess for and report changes in neurological status     Problem: Respiratory - Adult  Goal: Achieves optimal ventilation and oxygenation  Outcome: Progressing     Problem: Cardiovascular - Adult  Goal: Maintains optimal cardiac output and hemodynamic stability  Outcome: Progressing

## 2024-08-28 NOTE — CONSULTS
Dae Girard  8/28/2024  [id number]    Chief Complaint: Stroke of unknown etiology (HCC)    Subjective:   This is a 73-year-old male with a past medical history including:  Past Medical History:   Diagnosis Date    Fainted 06/2017    High cholesterol     Hypertension     Hypothyroid     Kidney disease     dialysis: Right arm fistula; states will be doing dialysis at home    Type II or unspecified type diabetes mellitus without mention of complication, not stated as uncontrolled      Who came to the hospital with altered mental status and strokelike symptoms following dialysis at home.  He did have some aphasia at this time.  CT head/CTA were unremarkable he did have TNK worsening of his aphasia.  After this MRI showed no acute infarct.  He is feeling a little better today but continues to be limited in therapy.  AM-PAC scores of 17/18.  He continues to have blood pressure issues and global aphasia.  He likely will need intensive therapy before returning home.    ROS: No CP, SOB, dyspnea    Objective:  Patient Vitals for the past 24 hrs:   BP Temp Temp src Pulse Resp SpO2 Weight   08/28/24 0935 -- -- -- -- -- -- 73.1 kg (161 lb 3.2 oz)   08/28/24 0720 (!) 173/55 98 °F (36.7 °C) Oral 73 17 94 % --   08/28/24 0333 (!) 152/59 98.2 °F (36.8 °C) Oral 74 15 95 % --   08/28/24 0225 (!) 150/53 -- -- -- -- -- --   08/27/24 2358 (!) 182/57 98.3 °F (36.8 °C) Oral 73 16 98 % --   08/27/24 2020 (!) 165/52 98.4 °F (36.9 °C) Oral 64 16 92 % --   08/27/24 1845 (!) 149/51 98 °F (36.7 °C) Oral 60 15 97 % --   08/27/24 1730 (!) 143/46 -- -- 61 17 -- --   08/27/24 1700 (!) 158/51 -- -- 62 17 -- --   08/27/24 1630 (!) 146/47 -- -- 63 13 -- --   08/27/24 1600 (!) 138/56 98 °F (36.7 °C) Oral 68 16 -- --   08/27/24 1500 (!) 114/93 -- -- 88 -- 98 % --   08/27/24 1330 (!) 130/119 -- -- 83 18 -- --   08/27/24 1300 (!) 150/79 -- -- 72 16 96 % --   08/27/24 1230 (!) 156/60 -- -- 67 19 98 % --   08/27/24 1202 (!) 145/77 97 °F (36.1 °C) -- 69 16  100 % 71.2 kg (156 lb 15.5 oz)   08/27/24 1200 (!) 145/77 98 °F (36.7 °C) Oral 70 26 98 % --   08/27/24 1130 117/66 -- -- 80 18 100 % --     Gen: No distress, pleasant.   HEENT: Normocephalic, atraumatic.   CV: No audible murmurs, well perfused extremities  Resp: No respiratory distress. No increased WOB  Abd: Soft, nontender nondistended  Ext: No edema.   Neuro: Alert, oriented, appropriately interactive.     Laboratory data: Available via EMR.     Therapy progress:    PT    Rolling: Level of difficulty - A Little   Sit to Stand from a Chair: Level of difficulty - A Little  Supine to Sit: Level of difficulty - A Little     Bed to Chair: Physical Assistance Required - A Little  Ambulate Across Room: Physical Assistance Required - A Little  Ascend 3-5 Steps With HR: Physical Assistance Required - A Little    OT    Eating: Physical Assistance Required - None  Grooming: Physical Assistance Required - A Little  LB Dressing: Physical Assistance Required - A Little  UB Dressing: Physical Assistance Required - A Little  Bathing: Physical Assistance Required - A Lot  Toileting: Physical Assistance Required - A Little    SLP         Body mass index is 24.51 kg/m².    Assessment:  Patient Active Problem List   Diagnosis    Type II or 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, uncontrolled(250.62)    Diabetic polyneuropathy (HCC)    Diabetes mellitus with background retinopathy (HCC)    Other and unspecified hyperlipidemia    Type II or 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, not stated as uncontrolled(250.60)    Nephritis and nephropathy, not specified as acute or chronic, with other specified pathological lesion in kidney, in diseases classified elsewhere    Sepsis (HCC)    ESRD (end stage renal disease) ([facility])    Electrolyte imbalance    Hypertension    COVID-19    Stroke of unknown etiology (HCC)    Cerebrovascular accident (CVA) ([facility])    Stage 3b chronic kidney disease

## 2024-08-28 NOTE — CARE COORDINATION
Addendum at 2:31pm: Received notification from [facility] liaison that they started precert.     Addendum at 11:08am: Received voicemail from pt's wife Jessica. Return call placed to Jessica. Discussed SNF recommendations and she states she will either need pt to go to a SNF with in-house HD or they will need to transport him. Discussed the barriers for transportation if needs stretcher. She does NOT want pt to go to [facility]. She states she will be here later this afternoon and aware writer will work on getting a SNF list with in-house HD.     SNF list with in-house HD left at bedside for his wife. Pt aware. RN updated and states will check on pt.     9:31am: Chart review completed. ARU consult pending.     Received notification from RN CM that pt's wife returned her call late yesterday evening. Return message left for pt's wife Jessica requesting call back.     Pt requires precert for SNF or ARU.    SW will follow    EDWAR Sullivan   for [facility] ([facility])  [place] Mobile: [phone]

## 2024-08-28 NOTE — PROGRESS NOTES
Nephrology Progress Note                                                                                                                                                                                                                                                                                                                                                               Office : [phone]     Fax :[phone]    Patient's Name: Dae Girard    8/28/2024    Reason for Consult:  ESRD   Requesting Physician:  Maik Godwin DO  Chief Complaint:    Chief Complaint   Patient presents with    Altered Mental Status     Patient to the ER with increased altered mental status following home dialysis. LKN 9pm       Assessment/Plan     #1 ESRD  #2 Hypertension uncontrolled  #3 Anemia of chronic disease  #4 CVA  #5 Electrolyte imbalance  #6 CAD   #7 DM2     Plan:  - Cont HD TTS schedule here   - Renally dose meds for ESRD  - Monitor PO and adjust PO regimen as needed   - Holding EPO  - MRI without evidence of ICH      History of Present Ilness:    73 y.o. y/o male with history significant for ESRD (on at home HD 4x weekly), HTN, HLD, T2DM, hypothyroidism, CAD s/p CABG x 3 (2022) and ANNIKA (2023), CHF, and hallucinations presented to [facility] with chief complaint of aphasia that started while he was receiving home dialysis. NIH 3 on arrival to the ED. CT head unremarkable. CTA non-diagnostic due to motion artifact but per  stroke team note, no LVO. Patient's aphasia fluctuated int he ED, and had initially improved substantially, to where TNK was not advised. However, on repeat examination, his aphasia had acutely worsened, therefore he received TNK at 1:25 AM on 8/26/24. Not a candidate for EVT due to no LVO and low NIHSS. Patient has multiple risk vascular risk factors for stroke.     Interval hx:    BP's elevated  Lytes stable  S/p HD yesterday and BP dropped during tx - minimal UF      Past Medical History:

## 2024-08-29 VITALS
HEART RATE: 69 BPM | SYSTOLIC BLOOD PRESSURE: 131 MMHG | RESPIRATION RATE: 16 BRPM | WEIGHT: 161.16 LBS | HEIGHT: 68 IN | DIASTOLIC BLOOD PRESSURE: 48 MMHG | TEMPERATURE: 97.8 F | OXYGEN SATURATION: 96 % | BODY MASS INDEX: 24.42 KG/M2

## 2024-08-29 LAB
ALBUMIN SERPL-MCNC: 4.2 G/DL (ref 3.4–5)
ANION GAP SERPL CALCULATED.3IONS-SCNC: 17 MMOL/L (ref 3–16)
BUN SERPL-MCNC: 45 MG/DL (ref 7–20)
CALCIUM SERPL-MCNC: 9 MG/DL (ref 8.3–10.6)
CHLORIDE SERPL-SCNC: 97 MMOL/L (ref 99–110)
CO2 SERPL-SCNC: 23 MMOL/L (ref 21–32)
CREAT SERPL-MCNC: 5.4 MG/DL (ref 0.8–1.3)
DEPRECATED RDW RBC AUTO: 15.5 % (ref 12.4–15.4)
GFR SERPLBLD CREATININE-BSD FMLA CKD-EPI: 10 ML/MIN/{1.73_M2}
GLUCOSE BLD-MCNC: 125 MG/DL (ref 70–99)
GLUCOSE BLD-MCNC: 144 MG/DL (ref 70–99)
GLUCOSE SERPL-MCNC: 104 MG/DL (ref 70–99)
HCT VFR BLD AUTO: 45.8 % (ref 40.5–52.5)
HGB BLD-MCNC: 15.1 G/DL (ref 13.5–17.5)
MAGNESIUM SERPL-MCNC: 2.9 MG/DL (ref 1.8–2.4)
MCH RBC QN AUTO: 30.5 PG (ref 26–34)
MCHC RBC AUTO-ENTMCNC: 32.9 G/DL (ref 31–36)
MCV RBC AUTO: 92.7 FL (ref 80–100)
PERFORMED ON: ABNORMAL
PERFORMED ON: ABNORMAL
PHOSPHATE SERPL-MCNC: 3.8 MG/DL (ref 2.5–4.9)
PLATELET # BLD AUTO: 292 K/UL (ref 135–450)
PMV BLD AUTO: 6.9 FL (ref 5–10.5)
POTASSIUM SERPL-SCNC: 5.1 MMOL/L (ref 3.5–5.1)
RBC # BLD AUTO: 4.95 M/UL (ref 4.2–5.9)
SODIUM SERPL-SCNC: 137 MMOL/L (ref 136–145)
WBC # BLD AUTO: 9 K/UL (ref 4–11)

## 2024-08-29 PROCEDURE — 6370000000 HC RX 637 (ALT 250 FOR IP): Performed by: INTERNAL MEDICINE

## 2024-08-29 PROCEDURE — 6360000002 HC RX W HCPCS

## 2024-08-29 PROCEDURE — 36415 COLL VENOUS BLD VENIPUNCTURE: CPT

## 2024-08-29 PROCEDURE — 97530 THERAPEUTIC ACTIVITIES: CPT

## 2024-08-29 PROCEDURE — 80069 RENAL FUNCTION PANEL: CPT

## 2024-08-29 PROCEDURE — 2580000003 HC RX 258

## 2024-08-29 PROCEDURE — 97116 GAIT TRAINING THERAPY: CPT

## 2024-08-29 PROCEDURE — 6370000000 HC RX 637 (ALT 250 FOR IP)

## 2024-08-29 PROCEDURE — 90935 HEMODIALYSIS ONE EVALUATION: CPT

## 2024-08-29 PROCEDURE — 92507 TX SP LANG VOICE COMM INDIV: CPT

## 2024-08-29 PROCEDURE — 90935 HEMODIALYSIS ONE EVALUATION: CPT | Performed by: INTERNAL MEDICINE

## 2024-08-29 PROCEDURE — 85027 COMPLETE CBC AUTOMATED: CPT

## 2024-08-29 PROCEDURE — 83735 ASSAY OF MAGNESIUM: CPT

## 2024-08-29 RX ORDER — ATORVASTATIN CALCIUM 80 MG/1
80 TABLET, FILM COATED ORAL NIGHTLY
Qty: 90 TABLET | Refills: 1 | Status: SHIPPED | OUTPATIENT
Start: 2024-08-29

## 2024-08-29 RX ORDER — AMLODIPINE BESYLATE 5 MG/1
5 TABLET ORAL 2 TIMES DAILY
Qty: 30 TABLET | Refills: 2 | Status: SHIPPED
Start: 2024-08-29

## 2024-08-29 RX ADMIN — AMLODIPINE BESYLATE 5 MG: 5 TABLET ORAL at 13:04

## 2024-08-29 RX ADMIN — TAMSULOSIN HYDROCHLORIDE 0.4 MG: 0.4 CAPSULE ORAL at 13:04

## 2024-08-29 RX ADMIN — ASPIRIN 81 MG: 81 TABLET, CHEWABLE ORAL at 13:04

## 2024-08-29 RX ADMIN — SODIUM CHLORIDE, PRESERVATIVE FREE 10 ML: 5 INJECTION INTRAVENOUS at 07:58

## 2024-08-29 RX ADMIN — RANOLAZINE 500 MG: 500 TABLET, EXTENDED RELEASE ORAL at 13:04

## 2024-08-29 RX ADMIN — LEVOTHYROXINE SODIUM 50 MCG: 50 TABLET ORAL at 06:13

## 2024-08-29 RX ADMIN — HEPARIN SODIUM 5000 UNITS: 5000 INJECTION INTRAVENOUS; SUBCUTANEOUS at 06:13

## 2024-08-29 NOTE — PLAN OF CARE
Problem: Safety - Adult  Goal: Free from fall injury  8/29/2024 0323 by Mikayla Edgar, RN  Outcome: Progressing  Flowsheets (Taken 8/29/2024 0323)  Free From Fall Injury: Instruct family/caregiver on patient safety  Note: Pt is a Med fall risk. See Babcock Fall Score and ABCDS Injury Risk assessments.   Explained fall risk precautions to pt and family and rationale behind their use to keep the patient safe. Pt bed is in low position, side rails up, call light and belongings are in reach. Fall wristband applied and present on pts wrist.  Bed alarm on.  Pt encouraged to call for assistance. Will continue with hourly rounds for PO intake, pain needs, toileting and repositioning as needed.         Problem: Neurosensory - Adult  Goal: Achieves stable or improved neurological status  8/29/2024 0323 by Mikayla Edgar, RN  Outcome: Progressing  Flowsheets (Taken 8/29/2024 0323)  Achieves stable or improved neurological status:   Monitor temperature, glucose, and sodium. Initiate appropriate interventions as ordered   Maintain blood pressure and fluid volume within ordered parameters to optimize cerebral perfusion and minimize risk of hemorrhage   Initiate measures to prevent increased intracranial pressure   Assess for and report changes in neurological status  Note: Patient's NIHSS is a 2. This is baseline for patient. No other stroke symptoms present. Plan of care ongoing.

## 2024-08-29 NOTE — PROGRESS NOTES
Nephrology Progress Note                                                                                                                                                                                                                                                                                                                                                               Office : [phone]     Fax :[phone]    Patient's Name: Dae Girard    8/29/2024    Reason for Consult:  ESRD   Requesting Physician:  Maik Godwin DO  Chief Complaint:    Chief Complaint   Patient presents with    Altered Mental Status     Patient to the ER with increased altered mental status following home dialysis. LKN 9pm       Assessment/Plan     #1 ESRD  #2 Hypertension uncontrolled  #3 Anemia of chronic disease  #4 CVA  #5 Electrolyte imbalance  #6 CAD   #7 DM2     Plan:  - Cont HD TTS schedule here   - Renally dose meds for ESRD  - Monitor PO and adjust PO regimen as needed   - Holding EPO  - MRI without evidence of ICH      History of Present Ilness:    73 y.o. y/o male with history significant for ESRD (on at home HD 4x weekly), HTN, HLD, T2DM, hypothyroidism, CAD s/p CABG x 3 (2022) and ANNIKA (2023), CHF, and hallucinations presented to [facility] with chief complaint of aphasia that started while he was receiving home dialysis. NIH 3 on arrival to the ED. CT head unremarkable. CTA non-diagnostic due to motion artifact but per  stroke team note, no LVO. Patient's aphasia fluctuated int he ED, and had initially improved substantially, to where TNK was not advised. However, on repeat examination, his aphasia had acutely worsened, therefore he received TNK at 1:25 AM on 8/26/24. Not a candidate for EVT due to no LVO and low NIHSS. Patient has multiple risk vascular risk factors for stroke.     Interval hx:    Seen on HD  BP's controlled  Wife is hoping patient can transition to in-center HD for a bit  She is nervous about having

## 2024-08-29 NOTE — CARE COORDINATION
Addendum at 11:14am:  Received this perfect serve from Megan ROSS \"Ok we have a plan - no changes, he will continue to do Hd at home. Our home Hd nurse is going to go out to there house and supervise to make sure no issues and wife is comfortable \"        Case Management Assessment            Discharge Note                    Date / Time of Note: 8/29/2024 11:14 AM                  Discharge Note Completed by: EDWAR Sullivan   for [facility] ([facility])  [employer] Mobile: [phone]    Patient Name: Dae Girard   YOB: 1950  Diagnosis: Stroke of unknown etiology (HCC) [I63.9]  Cerebrovascular accident (CVA), unspecified mechanism (HCC) [I63.9]   Date / Time: 8/25/2024 11:45 PM    Current PCP: Maik Godwin DO  Clinic patient: No    Hospitalization in the last 30 days: No     Advance Directives:  Code Status: Full Code  [place] DNR form completed and on chart: Not Indicated    Financial:  Payor: HU[employer]NA MEDICARE / Plan: HU[employer] GOLD PLUS HMO / Product Type: *No Product type* /      Pharmacy:    [facility] [id number] [address] [phone] -  [phone]  [address] [id number]  Phone: [phone] Fax: [phone]    [facility] [address] [facility] [address] [facility] [phone] -  [phone]  [address] [id number]  Phone: [phone] Fax: [phone]      Assistance purchasing medications?: Potential Assistance Purchasing Medications: No  Assistance provided by Case Management: None at this time    Does patient want to participate in local refill/ meds to beds program?:      Meds To Beds General Rules:  1. Can ONLY be done Monday- Friday between 8:30am-5pm  2. Prescription(s) must be in pharmacy by 3pm to be filled same day  3.Copy of patient's insurance/ prescription drug card and patient face sheet must be sent along with the

## 2024-08-29 NOTE — PROGRESS NOTES
NIH Stroke Scale      Interval: Shift Handoff  Time: 10:05 PM  Person Administering Scale: Mikayla Edgar RN    Administer stroke scale items in the order listed. Record performance in each category after each subscale exam. Do not go back and change scores. Follow directions provided for each exam technique. Scores should reflect what the patient does, not what the clinician thinks the patient can do. The clinician should record answers while administering the exam and work quickly. Except where indicated, the patient should not be coached (i.e., repeated requests to patient to make a special effort).      1a  Level of consciousness: 0=alert; keenly responsive   1b. LOC questions:  0=Performs both tasks correctly   1c. LOC commands: 0=Performs both tasks correctly   2.  Best Gaze: 0=normal   3.  Visual: 0=No visual loss   4. Facial Palsy: 0=Normal symmetric movement   5a.  Motor left arm: 0=No drift, limb holds 90 (or 45) degrees for full 10 seconds   5b.  Motor right arm: 0=No drift, limb holds 90 (or 45) degrees for full 10 seconds   6a. motor left le=No drift, limb holds 90 (or 45) degrees for full 10 seconds   6b  Motor right le=No drift, limb holds 90 (or 45) degrees for full 10 seconds   7. Limb Ataxia: 0=Absent   8.  Sensory: 0=Normal; no sensory loss   9. Best Language:  1=Mild to moderate aphasia; some obvious loss of fluency or facility of comprehension without significant limitation on ideas expressed or form of expression.   10. Dysarthria: 1=Mild to moderate, patient slurs at least some words and at worst, can be understood with some difficulty   11. Extinction and Inattention: 0=No abnormality    Total:   2

## 2024-08-29 NOTE — PROGRESS NOTES
Dae Girard  8/29/2024  [id number]    Chief Complaint: Stroke of unknown etiology (HCC)    Subjective:   This is a 73-year-old male with a past medical history including:  Past Medical History:   Diagnosis Date    Fainted 06/2017    High cholesterol     Hypertension     Hypothyroid     Kidney disease     dialysis: Right arm fistula; states will be doing dialysis at home    Type II or unspecified type diabetes mellitus without mention of complication, not stated as uncontrolled      Who came to the hospital with altered mental status and strokelike symptoms following dialysis at home.  He did have some aphasia at this time.  CT head/CTA were unremarkable he did have TNK worsening of his aphasia.  After this MRI showed no acute infarct.  He is feeling a little better today but continues to be limited in therapy.  AM-PAC scores of 17/18.  He continues to have blood pressure issues and global aphasia.  He likely will need intensive therapy before returning home.\\    Interval history: Seen in his room today.  He would like to discharge home at this time and is refusing therapy.    ROS: No CP, SOB, dyspnea    Objective:  Patient Vitals for the past 24 hrs:   BP Temp Temp src Pulse Resp SpO2 Weight   08/29/24 0840 (!) 153/63 97.7 °F (36.5 °C) -- -- -- -- 73.1 kg (161 lb 2.5 oz)   08/29/24 0752 (!) 153/63 97.7 °F (36.5 °C) Oral 69 14 96 % --   08/29/24 0307 (!) 174/78 98.1 °F (36.7 °C) Oral 90 16 93 % --   08/28/24 2246 (!) 156/79 98.9 °F (37.2 °C) Oral 84 20 97 % --   08/28/24 1958 138/63 98.1 °F (36.7 °C) Oral 74 19 95 % --   08/28/24 1455 139/63 98.1 °F (36.7 °C) Oral 85 14 98 % --   08/28/24 1215 (!) 133/47 98.1 °F (36.7 °C) Oral 77 17 -- --     Gen: No distress, pleasant.   HEENT: Normocephalic, atraumatic.   CV: No audible murmurs, well perfused extremities  Resp: No respiratory distress. No increased WOB  Abd: Soft, nontender nondistended  Ext: No edema.   Neuro: Alert, oriented, appropriately interactive.

## 2024-08-29 NOTE — PROGRESS NOTES
Treatment time: 3 hours    Net UF: 1.5 liters    Pre weight: 73.1 kg  Post weight: 71.6 kg  EDW: 75.5 kg    Access used: Right CVC  Access function: Access site had good bruit and thrill, no signs of infection noted. No redness, hematoma nor swelling was observed. No discharges was noted.    Medications or blood products given: none    Regular outpatient schedule: TUES, THURS, SAT    Summary of response to treatment: 08:55: Treatment set at 1.5 liters within 3 hours as ordered via Right AVF. Access site had good bruit and thrill. No signs of infection noted. No redness hematoma, nor swelling was noted. No discharges was observed,. Cleaned site aseptically. Cannulated site with ease and no difficulty. Hooked to HD machine. Parameters set accordingly. Monitored from time to time.11:55 Treatment terminated. Returned Blood Aseptically. HD tolerated well.    Copy of dialysis treatment record placed in chart, to be scanned into EMR.

## 2024-08-29 NOTE — NURSE NAVIGATOR
2 week CAM monitor #  [id number] applied per order Oralia NOYOLA .  Instructions given and questions answered.  Bedside nurse aware.       Please send results to PCP Maik Godwin [phone], and [facility] [phone], fax [phone]

## 2024-08-29 NOTE — PROGRESS NOTES
Pt discharged to home with all belongings. PIV removed, pt tolerated well. AVS paperwork reviewed with patient and wife at bedside. All questions and concerns answered.

## 2024-08-29 NOTE — PROGRESS NOTES
Occupational Therapy  Visit Type: treatment  Precautions:  Medical precautions: ; standard precautions. Pt was admitted s/p fall suffering a closed nondisplaced fracture of her pelvis. MRI showed multiple pelvic fractures involving right and left sacrum, right iliac bone, bilateral obturator rings. Significant right hip muscle strain. Minimal strain of the left adductor muscles.  Lines:     Basic: IV, indwelling urinary catheter and NG  Safety Measures: sitter    SUBJECTIVE  Patient agreed to participate in therapy this date.  Patient verbally agrees to allow the following to be present during session: relative and daughter  Family and sister present, NG tube in place. Nursing approved session.     OBJECTIVE    Bed mobility:      Supine to sit: total assist - dependent  and 2 persons    Sit to supine: total assist - dependent  and 2 persons  Transfers:    Assistive devices: gait belt and 2-wheeled walker    Sit to stand: total assist - dependent  and 2 persons    Stand to sit: total assist - dependent  and 2 persons    Bed to chair: total assist - dependent  and 2 persons, type: stand pivot    Chair to bed: total assist - dependent  and 2 persons, type: pivot transfer    Activities of Daily Living (ADLs):  Lower Body Dressing:     Assist: total assist - non-dependent    Assist needed for: thread right lower extremity into underwear, thread left lower extremity into underwear and pull up over hips      Interventions    Training provided: ADL training, activity tolerance, balance retraining, bed mobility training, transfer training and safety training  Skilled input: verbal instruction/cues  Verbal Consent: Writer verbally educated and received verbal consent for hand placement, positioning of patient, and techniques to be performed today from patient for therapist position for techniques as described above and how they are pertinent to the patient's plan of care.        ASSESSMENT      Patient seen on 3rd floor  RN spoke to dialysis, instructed to hold all medications until after dialysis.    nursing unit. She presents below baseline  which was supervision  with  household mobility and supervision to moderate assist with self cares. Currently lives with her daughter and  in a(n) house. She has assist for bathing weekly from her daughter, supervision and vb cues for self cares due to hx of dementia but was ultimately able to move around without any adaptive device prior to admission. For safe return to prior living situation the patient needs to be supervision  for overall mobility and minimal-mod assist  for ADL.  Pt admitted after fall suffering multiple pelvic fractures.14 day re-assessment date : 4/2/22.    14 day re-assessment required this date: No  Modified Mookie Scale required and assessed this session: No    Occupational Therapy treatment session today focused on tolerance in sitting on EOB,  Functional transfers with ww and light familiar grooming tasks. Pt follows 1 step directions with repetition, mildly anxious and redirectable. Requires assist of 2 with tendency for short shuffled steps and posterior lean. bed. Continue to recommend MONIQUE after discharge.           Discharge Recommendations  Recommendations for Discharge: OT WI: Less intensive rehab            PT/OT Mobility Equipment for Discharge: has 2ww     OT Identified Barriers to Discharge: weakness, pain   Skilled therapy is required to address these limitations in attempt to maximize the patient's independence.  Progress: slow progress, decreased activity tolerance    End of Session:   Location: in chair  Safety measures: alarm system in place/re-engaged, call light within reach and sitter present  Handoff to: nurse  Education Provided:   Learning assessment:  - Primary learner: patient  - Are they ready to learn: yes  - Preferred learning style: verbal  - Barriers to learning: cognitive  Education provided during session:  - Receiving education: patient  - Results of above outlined education: Needs  reinforcement    PLAN  Suggestions for next session as indicated: Cotx with PT once more (pt was assist x3 on Monday). Once pt is safe to separate PT/OT sessions, decrease frequency to alternate days as able. Work on progressing transfers and short distance mobility toward sink.          Frequency Comments: 3/21 M-F (SA), LWa   Interventions: ADL retraining, activity tolerance training, functional transfer training and bed mobility training      GOALS  Review Date: 4/2/2022  Long Term Goals: (to be met by time of discharge from hospital)  Feeding: Patient will complete feeding tasks modified independent.  Grooming: Patient will complete grooming tasks modified independent.  Upper body dressing: Patient will complete upper body dressing set up and minimal cues.  Lower body dressing: Patient will complete lower body dressing minimal assist and minimal cues.  Toileting: Patient will complete toileting supervision.  Bathing: Patient will complete bathingmoderate assist Toilet transfer: Patient will complete toilet transfer with supervision.         Documented in the chart in the following areas: Assessment. Plan.      Therapy procedure time and total treatment time can be found documented on the Time Entry flowsheet

## 2024-08-29 NOTE — PROGRESS NOTES
MD contacted regarding prescriptions. Sent to [facility] pharmacy on [address]

## 2024-08-29 NOTE — PROGRESS NOTES
4 Eyes Skin Assessment     NAME:  Dae Girard  YOB: 1950  MEDICAL RECORD NUMBER:  [id number]    The patient is being assessed for  Shift Handoff    I agree that at least one RN has performed a thorough Head to Toe Skin Assessment on the patient. ALL assessment sites listed below have been assessed.      Areas assessed by both nurses:    Head, Face, Ears, Shoulders, Back, Chest, Arms, Elbows, Hands, Sacrum. Buttock, Coccyx, Ischium, Legs. Feet and Heels, and Under Medical Devices         Does the Patient have a Wound? No noted wound(s)       Shashi Prevention initiated by RN: Yes  Wound Care Orders initiated by RN: No    Pressure Injury (Stage 3,4, Unstageable, DTI, NWPT, and Complex wounds) if present, place Wound referral order by RN under : No    New Ostomies, if present place, Ostomy referral order under : No     Nurse 1 eSignature: Electronically signed by Mikayla Edgar RN on 8/28/24 at 10:06 PM EDT    **SHARE this note so that the co-signing nurse can place an eSignature**    Nurse 2 eSignature: {Esignature:[id number]}

## 2024-08-29 NOTE — PROGRESS NOTES
Speech Language Pathology  Facility/Department:[facility] ICU  Speech-Language Treatment  DC    Name: Dae Girard  : 1950  MRN: [id number]                                                     Patient Diagnosis(es):   Patient Active Problem List    Diagnosis Date Noted    Sepsis ([facility]) 2023    Stroke of unknown etiology ([facility]) 2024    Cerebrovascular accident (CVA) ([facility]) 2024    Stage 3b chronic kidney disease ([facility]) 2024    COVID-19 2024    Electrolyte imbalance 2023    Hypertension 2023    ESRD (end stage renal disease) ([facility]) 2023    Type II or 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, not stated as uncontrolled(250.60) 2011    Nephritis and nephropathy, not specified as acute or chronic, with other specified pathological lesion in kidney, in diseases classified elsewhere 2011    Type II or 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, uncontrolled(250.62) 2011    Diabetic polyneuropathy ([facility]) 2011    Diabetes mellitus with background retinopathy ([facility]) 2011    Other and unspecified hyperlipidemia 2011     Past Medical History:   Diagnosis Date    Fainted 2017    High cholesterol     Hypertension     Hypothyroid     Kidney disease     dialysis: Right arm fistula; states will be doing dialysis at home    Type II or unspecified type diabetes mellitus without mention of complication, not stated as uncontrolled      Past Surgical History:   Procedure Laterality Date    CARDIAC SURGERY      2022    COLONOSCOPY      last colonscopy was     DIALYSIS FISTULA CREATION Right     right arm fistula Dec. 2020    HAND SURGERY Right     carpal tunnel    KNEE ARTHROSCOPY Left     OTHER SURGICAL HISTORY      heart stents 2023     Reason for Referral:  Dae Girard was referred for a Speech Therapy evaluation to assess swallow function and/or communication.    History of Present

## 2024-08-29 NOTE — PROGRESS NOTES
Physical Therapy  Facility/Department: [address]  Physical Therapy Treatment    Name: Dae Girard  : 1950  MRN: [id number]  Date of Service: 2024    Discharge Recommendations:  24 hour supervision or assist, Outpatient PT   PT Equipment Recommendations  Equipment Needed: No  Other: owns RW      Patient Diagnosis(es): The primary encounter diagnosis was Cerebrovascular accident (CVA), unspecified mechanism (HCC). A diagnosis of Paroxysmal atrial fibrillation (HCC) was also pertinent to this visit.  Past Medical History:  has a past medical history of Fainted, High cholesterol, Hypertension, Hypothyroid, Kidney disease, and Type II or unspecified type diabetes mellitus without mention of complication, not stated as uncontrolled.  Past Surgical History:  has a past surgical history that includes Knee arthroscopy (Left); Hand surgery (Right); Cardiac surgery; Dialysis fistula creation (Right); other surgical history; and Colonoscopy.    Assessment  Body Structures, Functions, Activity Limitations Requiring Skilled Therapeutic Intervention: Decreased functional mobility ;Decreased strength;Decreased safe awareness;Decreased endurance;Decreased balance  Assessment: Pt is 73 y.o. male admit with aphasia, (+) CVA.  Pt is from home with wife and ambulates independently at baseline with occasional assist of cane vs walker.  Today, no aphasia noted, requires redirection and perseverates at times which limits his ability to follow conversation and simple commands.  Demos symmetrical LE strength and moderate impulsivity with mobility.  Decreased insight into deficits and decreased safety awareness.  Pt has numerous steps in his quad level home.  Rec home with 24hr (A) from wife and patient planning to return to outpatient PT.  Recommend RW for all standing mobility.  Treatment Diagnosis: impaired functional mobility  Barriers to Learning: decreased safety awareness  Requires PT Follow-Up:

## 2024-08-29 NOTE — PROGRESS NOTES
Nephrology Progress Note                                                                                                                                                                                                                                                                                                                                                               Office : [phone]     Fax :[phone]    Patient's Name: Dae Girard    8/29/2024    Reason for Consult:  ESRD   Requesting Physician:  Maik Godwin DO  Chief Complaint:    Chief Complaint   Patient presents with    Altered Mental Status     Patient to the ER with increased altered mental status following home dialysis. LKN 9pm       Assessment/Plan     #1 ESRD  #2 Hypertension uncontrolled  #3 Anemia of chronic disease  #4 CVA  #5 Electrolyte imbalance  #6 CAD   #7 DM2     Plan:  - Cont HD TTS schedule here   - Renally dose meds for ESRD  - Monitor PO and adjust PO regimen as needed   - Holding EPO  - MRI without evidence of ICH      History of Present Ilness:    73 y.o. y/o male with history significant for ESRD (on at home HD 4x weekly), HTN, HLD, T2DM, hypothyroidism, CAD s/p CABG x 3 (2022) and ANNIKA (2023), CHF, and hallucinations presented to [facility] with chief complaint of aphasia that started while he was receiving home dialysis. NIH 3 on arrival to the ED. CT head unremarkable. CTA non-diagnostic due to motion artifact but per  stroke team note, no LVO. Patient's aphasia fluctuated int he ED, and had initially improved substantially, to where TNK was not advised. However, on repeat examination, his aphasia had acutely worsened, therefore he received TNK at 1:25 AM on 8/26/24. Not a candidate for EVT due to no LVO and low NIHSS. Patient has multiple risk vascular risk factors for stroke.     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 Axial images, multiplanar reformatted images, and 3D post-processing, maximum intensity projection images were reviewed for CT angiographic    technique.   Individualized dose optimization technique was used in order to meet ALARA standards for radiation dose reduction.  In addition to vendor specific dose reduction algorithms, the dose reduction techniques vary based on the specific scanner    utilized but frequently include automated exposure control, adjustment of the mA and/or kV according to patient size, and use of iterative reconstruction technique.      IV contrast: 75 mL Isovue-370.      FINDINGS:      ANTERIOR CIRCULATION: The middle cerebral and anterior cerebral arteries are difficult to evaluate due to significant motion. The exam is essentially nondiagnostic although no definite cutoff or acute large vessel occlusion can be identified.      POSTERIOR CIRCULATION: The exam is significantly degraded by motion and accentuating nondiagnostic. There is no evidence of occlusion of the basilar artery.      INTRACRANIAL VENOUS SYSTEM: No evidence for intracranial venous thrombosis.         IMPRESSION:      1. Exam is essentially nondiagnostic due to motion but no definite large vessel occlusion is identified.        Electronically signed by Caterina Sevilla MD      CT HEAD WO CONTRAST   Final Result      No acute intracranial pathology        Atrophy and ischemic leukoencephalopathy.      Findings called to ER physician on 8/26/2024 at 12      Electronically signed by Caterina Sevilla MD          Medical Decision Making:  The following items were considered in medical decision making:  Discussion of patient care with other

## 2024-08-29 NOTE — DISCHARGE SUMMARY
Discharge Summary    Name:  Dae Girard /Age/Sex: 1950  (73 y.o. male)   MRN & CSN:  [id number] & [id number] Admission Date/Time: 2024 11:45 PM   Attending:  Julia Castro MD Discharging Physician: Julia Castro MD       Discharge Diagnosis:  Expressive aphasia  Hypertension  ESRD  Type II DM  Hypothyroidism  CAD s/p CABG  GERD      Discharge Exam  Physical Exam  Vitals:    24 0840 24 1100 24 1240 24 1304   BP: (!) 153/63 122/64 (!) 124/59 (!) 131/48   Pulse:   77 69   Resp:   16    Temp: 97.7 °F (36.5 °C) 97.8 °F (36.6 °C) 97.8 °F (36.6 °C)    TempSrc:   Oral    SpO2:   96%    Weight: 73.1 kg (161 lb 2.5 oz)      Height:          Physical Exam  Vitals reviewed.   Constitutional:       Appearance: He is normal weight. He is not ill-appearing or toxic-appearing.   HENT:      Head: Normocephalic and atraumatic.   Cardiovascular:      Rate and Rhythm: Normal rate.   Pulmonary:      Effort: Pulmonary effort is normal.      Breath sounds: Normal breath sounds. No stridor. No rhonchi.   Abdominal:      Palpations: Abdomen is soft.      Tenderness: There is no abdominal tenderness. There is no guarding.   Musculoskeletal:      Right lower leg: No edema.      Left lower leg: No edema.   Skin:     General: Skin is warm and dry.      Capillary Refill: Capillary refill takes less than 2 seconds.      Coloration: Skin is not jaundiced.      Findings: No bruising or erythema.   Neurological:      Mental Status: He is alert. Mental status is at baseline.      Cranial Nerves: No cranial nerve deficit.      Motor: No weakness.      Comments: alert and oriented     Hospital Course:   Dae Girard is a 73 y.o.  male  who presents with Stroke of unknown etiology (HCC)      Expressive aphasia: POA  -Presented  with aphasia  -There was suspicion of stroke s/p TNK 01:25.   -MRI shows no ischemia or hemorrhage.  -Stroke appears ruled out; not present on admission  -His transient  aphasia may be secondary to TIA, hypertensive emergency with hypertensive encephalopathy or hemodynamic changes associated with dialysis; appears resolved at this time  -Complete TIA workup  - LDL and A1c in process   - Vessel imaging unremarkable. Recent (8/21/24) TTE w/ left atrial dilation.   - ASA, statin  -Neurology will arrange long term cardiac monitor   -Optimize blood pressure control: Appears he has had recent changes to his antihypertensive medications and wife reports poor control in the outpatient setting  -Resume amlodipine 5 mg twice daily     Hypertension  - Was placed on cardene gtt post TNK for blood pressure control  - Continue losartan and amlodipine     ESRD  Nephrology consulted for HD  Will be undergoing home HD.     Hallucinations  Olanzapine recently stopped, consider resumption if needed, and no C/i     Diabetes mellitus type 2, complicated by nephropathy, peripheral neuropathy: POA  Patient on Trulicity at home  Hold Trulicity, 3 times daily glucose checks  Hypoglycemia protocol  Recheck A1c  Avoid aggressive blood glucose control     Hypothyroidism  Continue home dose of 50 mcg Synthroid  TSH 3.29     CAD s/p CABG 2022, multiple caths, most recently 2023, no longer on DAPT.  -Troponin slightly elevated in the ED, EKG nonischemic.  Troponin elevation nonsignificant  likely sec to ESRD  ASA  Continue Ranexa 500 mg BID     GERD  Continue home Protonix 40 mg  Patient scheduled for outpatient EGD and colonoscopy    The patient expressed appropriate understanding of and agreement with the discharge recommendations, medications, and plan.     Consults this admission:  IP CONSULT TO STROKE TEAM  IP CONSULT TO NEUROLOGY  IP CONSULT TO PHYSICAL MEDICINE REHAB  IP CONSULT TO SPIRITUAL SERVICES      Discharge Instruction:   Handoff to PCP:     Follow up appointments: PCP   Primary care physician:     Diet:  ADULT DIET; Regular    Activity: activity as tolerated  Disposition: Discharged to:   [x]Home,

## 2024-09-13 ENCOUNTER — NURSE ONLY (OUTPATIENT)
Dept: CARDIOLOGY CLINIC | Age: 74
End: 2024-09-13

## 2024-11-30 ENCOUNTER — APPOINTMENT (OUTPATIENT)
Dept: GENERAL RADIOLOGY | Age: 74
End: 2024-11-30
Payer: MEDICARE

## 2024-11-30 ENCOUNTER — HOSPITAL ENCOUNTER (EMERGENCY)
Age: 74
Discharge: HOME | End: 2024-11-30
Attending: STUDENT IN AN ORGANIZED HEALTH CARE EDUCATION/TRAINING PROGRAM
Payer: MEDICARE

## 2024-11-30 VITALS
BODY MASS INDEX: 26.73 KG/M2 | RESPIRATION RATE: 12 BRPM | TEMPERATURE: 97.9 F | HEIGHT: 68 IN | OXYGEN SATURATION: 98 % | DIASTOLIC BLOOD PRESSURE: 36 MMHG | SYSTOLIC BLOOD PRESSURE: 132 MMHG | WEIGHT: 176.4 LBS | HEART RATE: 59 BPM

## 2024-11-30 DIAGNOSIS — Z13.9 ENCOUNTER FOR MEDICAL SCREENING EXAMINATION: Primary | ICD-10-CM

## 2024-11-30 LAB
ANION GAP SERPL CALCULATED.3IONS-SCNC: 17 MMOL/L (ref 3–16)
BACTERIA URNS QL MICRO: ABNORMAL /HPF
BASOPHILS # BLD: 0 K/UL (ref 0–0.2)
BASOPHILS NFR BLD: 0.3 %
BILIRUB UR QL STRIP.AUTO: NEGATIVE
BUN SERPL-MCNC: 41 MG/DL (ref 7–20)
CALCIUM SERPL-MCNC: 7.3 MG/DL (ref 8.3–10.6)
CHLORIDE SERPL-SCNC: 90 MMOL/L (ref 99–110)
CLARITY UR: CLEAR
CO2 SERPL-SCNC: 25 MMOL/L (ref 21–32)
COLOR UR: YELLOW
CREAT SERPL-MCNC: 3.5 MG/DL (ref 0.8–1.3)
DEPRECATED RDW RBC AUTO: 18.8 % (ref 12.4–15.4)
EOSINOPHIL # BLD: 0.2 K/UL (ref 0–0.6)
EOSINOPHIL NFR BLD: 1.8 %
EPI CELLS #/AREA URNS HPF: ABNORMAL /HPF (ref 0–5)
FLUAV RNA RESP QL NAA+PROBE: NOT DETECTED
FLUBV RNA RESP QL NAA+PROBE: NOT DETECTED
GFR SERPLBLD CREATININE-BSD FMLA CKD-EPI: 17 ML/MIN/{1.73_M2}
GLUCOSE SERPL-MCNC: 155 MG/DL (ref 70–99)
GLUCOSE UR STRIP.AUTO-MCNC: NEGATIVE MG/DL
HCT VFR BLD AUTO: 29.3 % (ref 40.5–52.5)
HGB BLD-MCNC: 9.9 G/DL (ref 13.5–17.5)
HGB UR QL STRIP.AUTO: NEGATIVE
KETONES UR STRIP.AUTO-MCNC: NEGATIVE MG/DL
LEUKOCYTE ESTERASE UR QL STRIP.AUTO: ABNORMAL
LYMPHOCYTES # BLD: 1.4 K/UL (ref 1–5.1)
LYMPHOCYTES NFR BLD: 10.3 %
MCH RBC QN AUTO: 28.5 PG (ref 26–34)
MCHC RBC AUTO-ENTMCNC: 33.6 G/DL (ref 31–36)
MCV RBC AUTO: 85 FL (ref 80–100)
MONOCYTES # BLD: 1.2 K/UL (ref 0–1.3)
MONOCYTES NFR BLD: 9.1 %
NEUTROPHILS # BLD: 10.4 K/UL (ref 1.7–7.7)
NEUTROPHILS NFR BLD: 78.5 %
NITRITE UR QL STRIP.AUTO: NEGATIVE
PH UR STRIP.AUTO: 6.5 [PH] (ref 5–8)
PLATELET # BLD AUTO: 253 K/UL (ref 135–450)
PMV BLD AUTO: 6.8 FL (ref 5–10.5)
POTASSIUM SERPL-SCNC: 4 MMOL/L (ref 3.5–5.1)
PROT UR STRIP.AUTO-MCNC: 100 MG/DL
RBC # BLD AUTO: 3.45 M/UL (ref 4.2–5.9)
RBC #/AREA URNS HPF: ABNORMAL /HPF (ref 0–4)
SARS-COV-2 RNA RESP QL NAA+PROBE: NOT DETECTED
SODIUM SERPL-SCNC: 132 MMOL/L (ref 136–145)
SP GR UR STRIP.AUTO: 1.01 (ref 1–1.03)
TROPONIN, HIGH SENSITIVITY: 45 NG/L (ref 0–22)
TROPONIN, HIGH SENSITIVITY: 48 NG/L (ref 0–22)
UA COMPLETE W REFLEX CULTURE PNL UR: YES
UA DIPSTICK W REFLEX MICRO PNL UR: YES
URN SPEC COLLECT METH UR: ABNORMAL
UROBILINOGEN UR STRIP-ACNC: 0.2 E.U./DL
WBC # BLD AUTO: 13.3 K/UL (ref 4–11)
WBC #/AREA URNS HPF: ABNORMAL /HPF (ref 0–5)

## 2024-11-30 PROCEDURE — 85025 COMPLETE CBC W/AUTO DIFF WBC: CPT

## 2024-11-30 PROCEDURE — 87086 URINE CULTURE/COLONY COUNT: CPT

## 2024-11-30 PROCEDURE — 87636 SARSCOV2 & INF A&B AMP PRB: CPT

## 2024-11-30 PROCEDURE — 93005 ELECTROCARDIOGRAM TRACING: CPT | Performed by: INTERNAL MEDICINE

## 2024-11-30 PROCEDURE — 84484 ASSAY OF TROPONIN QUANT: CPT

## 2024-11-30 PROCEDURE — 80048 BASIC METABOLIC PNL TOTAL CA: CPT

## 2024-11-30 PROCEDURE — 71046 X-RAY EXAM CHEST 2 VIEWS: CPT

## 2024-11-30 PROCEDURE — 99285 EMERGENCY DEPT VISIT HI MDM: CPT

## 2024-11-30 PROCEDURE — 81001 URINALYSIS AUTO W/SCOPE: CPT

## 2024-11-30 ASSESSMENT — ENCOUNTER SYMPTOMS
CONSTIPATION: 0
EYE ITCHING: 0
ABDOMINAL PAIN: 0
SORE THROAT: 0
COLOR CHANGE: 0
SINUS PRESSURE: 0
NAUSEA: 0
DIARRHEA: 0
RHINORRHEA: 0
BACK PAIN: 0
EYE DISCHARGE: 0
SHORTNESS OF BREATH: 0
CHEST TIGHTNESS: 0
CHOKING: 0

## 2024-11-30 NOTE — ED PROVIDER NOTES
ED Attending Attestation Note     Date of evaluation: 11/30/2024    I have discussed the case with the ALESSANDRA. I have personally performed a history, physical exam, and my own medical decision making. I have reviewed the note and agree with the findings and plan.  I have reviewed the ECG and concur with the ALESSANDRA's interpretation.     INITIAL VITALS: BP: (!) 126/37, Temp: 97.9 °F (36.6 °C), Pulse: 57, Respirations: 14, SpO2: 94 %   Physical Exam  Vitals reviewed.   Cardiovascular:      Rate and Rhythm: Normal rate.   Pulmonary:      Effort: Pulmonary effort is normal.   Neurological:      Mental Status: He is alert.         MDM:  My assessment reveals a well-appearing 74-year-old male presenting with feeling unwell during his home hemodialysis session.  His symptoms have resolved on examination and he is currently symptom-free.  His workup is altogether reassuring.  He did have some bacteria in his urine however he is asymptomatic and as such will be sent for culture to determine if he requires treatment.  As he remains symptom-free with a reassuring workup, he will be discharged home with strict return precautions and PCP follow-up.       Jonathan Harrison MD  11/30/24 2032

## 2024-11-30 NOTE — ED PROVIDER NOTES
[facility]  EMERGENCY DEPARTMENT ENCOUNTER          PHYSICIAN ASSISTANT NOTE       Date of evaluation: 11/30/2024    Chief Complaint     Nausea (Patient arriving from home for reports of nausea and weakness. Patient was receiving at home dialysis and received roughly over a hour of his treatment )      History of Present Illness     Dae Girard is a 74 y.o. male who presents with a PMH of ESRD on home HD, hyper cholesterolemia, hypertension, hypothyroidism, DM type II who presents to the emergency department due to feeling unwell.  Patient does at home hemodialysis 4 times a week.  Patient states that he was approximately 90 minutes into his 3-hour session when he started to feel \" bleh\".  When asked to describe this feeling, patient states that he feels like his pants were too tight.  Mild nausea, no vomiting.  He denies any abdominal pain.  Patient does make urine however denies dysuria or frequency.  Patient states that he had a mild episode of dizziness without any chest pain or shortness of breath.  His wife gave him some normal saline through his fistula and states that he feels back to his baseline upon arrival to the emergency department.    ASSESSMENT / PLAN  (MEDICAL DECISION MAKING)     INITIAL VITALS: BP: (!) 126/37, Temp: 97.9 °F (36.6 °C), Pulse: 57, Respirations: 14, SpO2: 94 %    Dae Girard is a 74 y.o. male who presents with a PMH of ESRD on home HD, hyper cholesterolemia, hypertension, hypothyroidism, DM type II who presents to the emergency department due to feeling unwell.  Patient does at home hemodialysis 4 times a week.  Patient states that he was approximately 90 minutes into his 3-hour session when he started to feel \" bleh\".  When asked to describe this feeling, patient states that he feels like his pants were too tight.  Mild nausea, no vomiting.  He denies any abdominal pain.  Patient does make urine however denies dysuria or frequency.  Patient states that he had a  uncontrolled.  He has a past surgical history that includes Knee arthroscopy (Left); Hand surgery (Right); Cardiac surgery; Dialysis fistula creation (Right); other surgical history; and Colonoscopy.  His family history includes Cancer in his mother; Heart Disease in his father and sister; Kidney Disease in his sister; Stroke in his father.  He reports that he has never smoked. He has never used smokeless tobacco. He reports that he does not currently use alcohol. He reports that he does not use drugs.    Medications     Discharge Medication List as of 11/30/2024  8:08 PM        CONTINUE these medications which have NOT CHANGED    Details   !! ondansetron (ZOFRAN-ODT) 4 MG disintegrating tablet DISSOLVE 1 TABLET IN MOUTH THREE TIMES DAILY AS NEEDED FOR NAUSEA FOR VOMITING, Disp-21 tablet, R-0Normal      !! ondansetron (ZOFRAN-ODT) 4 MG disintegrating tablet Take 1 tablet by mouth 3 times daily as needed for Nausea or Vomiting, Disp-21 tablet, R-0Normal      amLODIPine (NORVASC) 5 MG tablet Take 1 tablet by mouth in the morning and at bedtime, Disp-30 tablet, R-2Adjust Sig      atorvastatin (LIPITOR) 80 MG tablet Take 1 tablet by mouth nightly, Disp-90 tablet, R-1Normal      Dulaglutide 3 MG/0.5ML SOPN Inject 3 mg into the skin once a weekHistorical Med      calcium acetate 667 MG TABS Take 1 tablet by mouth 3 times dailyHistorical Med      B Complex-C-Folic Acid (DIALYVITE 800) 0.8 MG TABS Take 1 tablet by mouth dailyHistorical Med      ranolazine (RANEXA) 500 MG extended release tablet Take 1 tablet by mouth 2 times dailyHistorical Med      isosorbide dinitrate (ISORDIL) 20 MG tablet Take 1 tablet by mouth 2 times dailyHistorical Med      furosemide (LASIX) 40 MG tablet Take 1 tablet by mouth 2 times dailyHistorical Med      ezetimibe (ZETIA) 10 MG tablet Take 1 tablet by mouth every morningHistorical Med      Cholecalciferol (VITAMIN D3) 125 MCG (5000 UT) TABS Take 1 tablet by mouth dailyHistorical Med

## 2024-12-01 LAB
EKG ATRIAL RATE: 60 BPM
EKG DIAGNOSIS: NORMAL
EKG P AXIS: 102 DEGREES
EKG P-R INTERVAL: 178 MS
EKG Q-T INTERVAL: 490 MS
EKG QRS DURATION: 100 MS
EKG QTC CALCULATION (BAZETT): 490 MS
EKG R AXIS: 78 DEGREES
EKG T AXIS: 58 DEGREES
EKG VENTRICULAR RATE: 60 BPM

## 2024-12-01 NOTE — DISCHARGE INSTRUCTIONS
-You were seen in the emergency department due to feeling unwell during her dialysis treatment today.  Your workup is reassuring and her symptoms have resolved.  -I do recommend that you contact your kidney doctor for further discussion whether you need to get another dialysis treatment tomorrow as you did not complete your treatment today.  -Your urine did have some bacteria in it however due to not having any symptoms, it was sent for culture.  If your culture is positive, the pharmacist will send you in antibiotics.  -Return to the ER with concerning symptoms such as fever, chills, chest pain, shortness of breath or other concerning symptoms.

## 2024-12-02 LAB — BACTERIA UR CULT: NORMAL
